# Patient Record
Sex: FEMALE | Race: WHITE | NOT HISPANIC OR LATINO | Employment: OTHER | URBAN - METROPOLITAN AREA
[De-identification: names, ages, dates, MRNs, and addresses within clinical notes are randomized per-mention and may not be internally consistent; named-entity substitution may affect disease eponyms.]

---

## 2017-01-11 ENCOUNTER — HOSPITAL ENCOUNTER (EMERGENCY)
Facility: OTHER | Age: 82
Discharge: HOME | End: 2017-01-11
Attending: EMERGENCY MEDICINE
Payer: MEDICARE

## 2017-01-11 VITALS
DIASTOLIC BLOOD PRESSURE: 70 MMHG | HEART RATE: 74 BPM | OXYGEN SATURATION: 98 % | TEMPERATURE: 98 F | RESPIRATION RATE: 18 BRPM | SYSTOLIC BLOOD PRESSURE: 154 MMHG

## 2017-01-11 DIAGNOSIS — S02.2XXA CLOSED FRACTURE OF NASAL BONE, INITIAL ENCOUNTER: ICD-10-CM

## 2017-01-11 DIAGNOSIS — S02.401A CLOSED FRACTURE OF MAXILLA, UNSPECIFIED LATERALITY, INITIAL ENCOUNTER: Primary | ICD-10-CM

## 2017-01-11 PROCEDURE — 12051 INTMD RPR FACE/MM 2.5 CM/<: CPT

## 2017-01-11 PROCEDURE — 25000003 PHARM REV CODE 250: Performed by: EMERGENCY MEDICINE

## 2017-01-11 PROCEDURE — 99285 EMERGENCY DEPT VISIT HI MDM: CPT | Mod: 25

## 2017-01-11 RX ORDER — AMOXICILLIN AND CLAVULANATE POTASSIUM 875; 125 MG/1; MG/1
1 TABLET, FILM COATED ORAL
Status: COMPLETED | OUTPATIENT
Start: 2017-01-11 | End: 2017-01-11

## 2017-01-11 RX ORDER — AMOXICILLIN AND CLAVULANATE POTASSIUM 875; 125 MG/1; MG/1
1 TABLET, FILM COATED ORAL 2 TIMES DAILY
Qty: 20 TABLET | Refills: 0 | Status: SHIPPED | OUTPATIENT
Start: 2017-01-11 | End: 2017-01-21

## 2017-01-11 RX ORDER — LIDOCAINE HYDROCHLORIDE 10 MG/ML
10 INJECTION INFILTRATION; PERINEURAL
Status: COMPLETED | OUTPATIENT
Start: 2017-01-11 | End: 2017-01-11

## 2017-01-11 RX ORDER — ACETAMINOPHEN 500 MG
1000 TABLET ORAL
Status: COMPLETED | OUTPATIENT
Start: 2017-01-11 | End: 2017-01-11

## 2017-01-11 RX ADMIN — AMOXICILLIN AND CLAVULANATE POTASSIUM 1 TABLET: 875; 125 TABLET, FILM COATED ORAL at 05:01

## 2017-01-11 RX ADMIN — LIDOCAINE HYDROCHLORIDE 10 ML: 10 INJECTION, SOLUTION INFILTRATION; PERINEURAL at 02:01

## 2017-01-11 RX ADMIN — ACETAMINOPHEN 1000 MG: 500 TABLET ORAL at 03:01

## 2017-01-11 NOTE — ED PROVIDER NOTES
Encounter Date: 1/11/2017    SCRIBE #1 NOTE: I, Vicente Block, am scribing for, and in the presence of,  Dr. Cohen I have scribed the entire note.       History     Chief Complaint   Patient presents with    Fall     Pt reipped over someone's foot and hit face on ground. PT has epistaxis with deviated septum per EMS. Denies LOC or blood thinners. Pt has c-collar in place per EMS.      Review of patient's allergies indicates:  No Known Allergies  HPI Comments: Time seen by provider: 2:06 PM    This is a 89 y.o. female who presents via EMS with complaint of wounds to her lip and nose bleeds s/p mechanical trip and fall that occurred approximately 1 hour ago. She fell forward and landing on her face. She denies LOC, neck pain, numbness, weakness, nausea, vomiting, dizziness, light headedness and vision changes.  She denies any feeling of malocclusion.  She is not on any blood thinners. Her tetanus immunization is UTD.     The history is provided by the patient.     Past Medical History   Diagnosis Date    Cancer      No past medical history pertinent negatives.  Past Surgical History   Procedure Laterality Date    Breast surgery       History reviewed. No pertinent family history.  Social History   Substance Use Topics    Smoking status: Never Smoker    Smokeless tobacco: None    Alcohol use No     Review of Systems   Constitutional: Negative for chills, diaphoresis and fever.   HENT: Positive for nosebleeds. Negative for congestion and sore throat.    Eyes: Negative for pain and visual disturbance.   Respiratory: Negative for cough and shortness of breath.    Cardiovascular: Negative for chest pain.   Gastrointestinal: Negative for abdominal pain, diarrhea, nausea and vomiting.   Genitourinary: Negative for difficulty urinating and dysuria.   Musculoskeletal: Negative for back pain and neck pain.   Skin: Positive for color change and wound (upper lip).   Neurological: Negative for dizziness, syncope, weakness,  light-headedness, numbness and headaches.   Psychiatric/Behavioral: Negative for behavioral problems and confusion.       Physical Exam   Initial Vitals   BP Pulse Resp Temp SpO2   01/11/17 1349 01/11/17 1349 01/11/17 1349 01/11/17 1349 01/11/17 1349   194/89 85 17 97.7 °F (36.5 °C) 100 %     Physical Exam    Nursing note and vitals reviewed.  Constitutional: She appears well-developed and well-nourished. She is not diaphoretic. No distress. Cervical collar in place.   HENT:   Head: Normocephalic.   Mouth/Throat: Oropharynx is clear and moist.   Ecchymosis and swelling above the right eye and to the nasal bridge. 1.5cm horizontal laceration just above the upper lip that penetrates to the buccal mucosa. No septal hematoma. Crepitus noted to the cheeks bilaterally. Upper dentures in place.  Lower teeth with no avulsion, no fracture.     Eyes: Conjunctivae are normal. Pupils are equal, round, and reactive to light. Right eye exhibits no discharge. Left eye exhibits no discharge.   Neck: Normal range of motion. Neck supple.   Cardiovascular: Normal rate, regular rhythm and normal heart sounds.   Pulmonary/Chest: Breath sounds normal. No respiratory distress. She has no wheezes. She has no rhonchi. She has no rales.   Abdominal: Soft. Bowel sounds are normal. She exhibits no distension. There is no tenderness. There is no rebound and no guarding.   Musculoskeletal: Normal range of motion. She exhibits no edema or tenderness.   No C/T/L midline tenderness.    Neurological: She is alert and oriented to person, place, and time. She has normal strength. No sensory deficit.   Skin: Skin is warm and dry. No rash and no abscess noted. No erythema. No pallor.   Psychiatric: She has a normal mood and affect. Her behavior is normal. Judgment and thought content normal.         ED Course   Lac Repair  Date/Time: 1/11/2017 5:01 PM  Performed by: INNA DIXON.  Authorized by: INNA DIXON.   Consent Done: Not Needed  Body area:  mouth  Laceration length: 1.5 cm  Foreign bodies: no foreign bodies  Tendon involvement: none  Nerve involvement: none  Vascular damage: no  Anesthesia: local infiltration    Anesthesia:  Anesthesia: local infiltration  Local Anesthetic: lidocaine 1% without epinephrine   Anesthetic total: 2 mL  Patient sedated: no  Preparation: Patient was prepped and draped in the usual sterile fashion.  Irrigation solution: saline  Irrigation method: jet lavage  Amount of cleaning: extensive  Debridement: none  Degree of undermining: none  Skin closure: 6-0 Prolene (4 sutures)  Mucous membrane closure: 4-0 Chromic gut (5 sutures)  Number of sutures: 9  Technique: simple  Approximation: close  Approximation difficulty: complex  Patient tolerance: Patient tolerated the procedure well with no immediate complications        Labs Reviewed - No data to display           Imaging Results         CT Cervical Spine Without Contrast (Final result) Result time:  01/11/17 15:12:24    Final result by Dayanna Hopper MD (01/11/17 15:12:24)    Impression:      1. There are extensive bilateral mid face fractures. There are fractures of the anterior, lateral and medial walls of both maxillary sinuses with blood products seen within both  maxillary sinuses, bilateral nasal bone fractures, fractures of bilateral medial and lateral pterygoid plates and fractures of the anterior posterior wall of left sphenoid sinus.  2. There is no evidence acute cervical spine fracture. There are degenerative changes of the cervical spine.  3. There is no evidence acute intracranial process.        Electronically signed by: DAYANNA HOPPER MD  Date:     01/11/17  Time:    15:12     Narrative:    CT head, face and cervical spine    Cervical spine: There are degenerative changes throughout the cervical spine. There is evidence fracture. There is no prevertebral soft tissue swelling. There is scarring of bilateral pulmonary apices.    CT face: The there are bilateral  anterior, lateral and medial maxillary wall fractures with air-fluid levels and dense fluid seen within both maxillary sinuses.    Emphysema seen within the soft tissues of the left cheek and surrounding the left muscles of mastication.    There are bilateral nasal bone fractures.    There are fractures of bilateral medial and lateral pterygoid plates. There is are fractures of the anterior and posterior walls of the left sphenoid sinus with an air-fluid level seen within the sphenoid sinus.    The patient has had bilateral cataract repair.    CT head: there is no evidence of extra-axial fluid collection, midline shift, mass or mass effect. There is no evidence acute intracranial hemorrhage. The mastoid air cells are clear.            CT Maxillofacial Without Contrast (Final result) Result time:  01/11/17 15:12:24    Final result by Dayanna Hopper MD (01/11/17 15:12:24)    Impression:      1. There are extensive bilateral mid face fractures. There are fractures of the anterior, lateral and medial walls of both maxillary sinuses with blood products seen within both  maxillary sinuses, bilateral nasal bone fractures, fractures of bilateral medial and lateral pterygoid plates and fractures of the anterior posterior wall of left sphenoid sinus.  2. There is no evidence acute cervical spine fracture. There are degenerative changes of the cervical spine.  3. There is no evidence acute intracranial process.        Electronically signed by: DAYANNA HOPPER MD  Date:     01/11/17  Time:    15:12     Narrative:    CT head, face and cervical spine    Cervical spine: There are degenerative changes throughout the cervical spine. There is evidence fracture. There is no prevertebral soft tissue swelling. There is scarring of bilateral pulmonary apices.    CT face: The there are bilateral anterior, lateral and medial maxillary wall fractures with air-fluid levels and dense fluid seen within both maxillary sinuses.    Emphysema  seen within the soft tissues of the left cheek and surrounding the left muscles of mastication.    There are bilateral nasal bone fractures.    There are fractures of bilateral medial and lateral pterygoid plates. There is are fractures of the anterior and posterior walls of the left sphenoid sinus with an air-fluid level seen within the sphenoid sinus.    The patient has had bilateral cataract repair.    CT head: there is no evidence of extra-axial fluid collection, midline shift, mass or mass effect. There is no evidence acute intracranial hemorrhage. The mastoid air cells are clear.            CT Head Without Contrast (Final result) Result time:  01/11/17 15:12:23    Final result by Dayanna Hopper MD (01/11/17 15:12:23)    Impression:      1. There are extensive bilateral mid face fractures. There are fractures of the anterior, lateral and medial walls of both maxillary sinuses with blood products seen within both  maxillary sinuses, bilateral nasal bone fractures, fractures of bilateral medial and lateral pterygoid plates and fractures of the anterior posterior wall of left sphenoid sinus.  2. There is no evidence acute cervical spine fracture. There are degenerative changes of the cervical spine.  3. There is no evidence acute intracranial process.        Electronically signed by: DAYANNA HOPPER MD  Date:     01/11/17  Time:    15:12     Narrative:    CT head, face and cervical spine    Cervical spine: There are degenerative changes throughout the cervical spine. There is evidence fracture. There is no prevertebral soft tissue swelling. There is scarring of bilateral pulmonary apices.    CT face: The there are bilateral anterior, lateral and medial maxillary wall fractures with air-fluid levels and dense fluid seen within both maxillary sinuses.    Emphysema seen within the soft tissues of the left cheek and surrounding the left muscles of mastication.    There are bilateral nasal bone fractures.    There  are fractures of bilateral medial and lateral pterygoid plates. There is are fractures of the anterior and posterior walls of the left sphenoid sinus with an air-fluid level seen within the sphenoid sinus.    The patient has had bilateral cataract repair.    CT head: there is no evidence of extra-axial fluid collection, midline shift, mass or mass effect. There is no evidence acute intracranial hemorrhage. The mastoid air cells are clear.             Medical Decision Making:   History:   Old Medical Records: I decided to obtain old medical records.  Old Records Summarized: other records and records from previous admission(s).  Initial Assessment:   2:06PM:  Pt is a 89 y/o F who presents to ED s/p mechanical fall. Pt appears well, nontoxic.  She does have facial ecchymosis and swelling.  Will plan for irrigation, CT imaging, will continue to follow and reassess.  Clinical Tests:   Radiological Study: Ordered and Reviewed  Other:   I have discussed this case with another health care provider.    3:25PM:  Pt has extensive facial fractures on her CT.  Will plan to consult plastics. I updated pt and family regarding CT results and plan for plastic surgery, will continue to follow.      3:32PM:  I spoke with Dr. Gonzalez.  He will review the images and will call back.      4:06 PM - I spoke with Dr. Gonzalez again.  Pt can be seen tomorrow for follow up at 830AM.  Pt should be placed on Augmentin BID x 10 days along with nose injury precautions, and soft mechanical diet.  I updated pt and family regarding my conversation with Dr. Gonzalez. Will plan for laceration repair, will continue to follow.    5:31PM:  Pt doing well, she tolerated suture repair with no issues.  She was able to ambulate afterwards to the bathroom with steady gait and no issues.  Will plan to give her first dose of augmentin here.  I had a long discussion with the pt and son at bedside regarding plan of care.  While I do not think she requires  admission at this time, I did offer admission for further management if the pt and/or family felt uncomfortable with her going home given that is elderly and lives alone.  However pt does feel that she is fine to go home and feels comfortably going home and getting rest and will f/u tomorrow at her appt.  I counseled the pt regarding wound care precautions, nasal injury precautions and ED return warnings.  Pt agreeable to plan and all questions answered.  I feel that pt is stable for discharge and management as an outpatient and no further intervention is needed at this time.  Pt is comfortable returning to the ED if needed.  Will DC home in stable condition.                Scribe Attestation:   Scribe #1: I performed the above scribed service and the documentation accurately describes the services I performed. I attest to the accuracy of the note.    Attending Attestation:           Physician Attestation for Scribe:  Physician Attestation Statement for Scribe #1: I, Dr. Hurd, reviewed documentation, as scribed by Vicente Block in my presence, and it is both accurate and complete.                 ED Course     Clinical Impression:     1. Closed fracture of maxilla, unspecified laterality, initial encounter    2. Closed fracture of nasal bone, initial encounter             Monalisa Hurd MD  01/11/17 2001

## 2017-01-11 NOTE — ED NOTES
Ambulated with pt in hallway; Dr Hurd watched pt ambulating; pt ambulated with steady gait; denies any dizziness; pt started having epistasis from unknown source; Dr Hurd notified; pt given gauze and pressure held to bridge of nose.

## 2017-01-11 NOTE — DISCHARGE INSTRUCTIONS
We have prescribed you antibiotics. Please fill and take as directed. It is important that you completely finish the antibiotics.      Restrict your diet to liquids and pureed and soft foods.  Do not eat any foods that require chewing.    Do not use your dentures at this time.      Keep your head of the bed elevated at night and do not blow your nose.      Please return to the ER if you have chest pain, difficulty breathing, fevers, altered mental status, dizziness, weakness, or any other concerns.      Follow up with Dr. Gonzalez at 8:30AM tomorrow.      Have the sutures removed in approximately 1 week.

## 2017-01-11 NOTE — ED TRIAGE NOTES
Pt tripped over someone's foot and hit face on ground. PT has epistaxis with deviated septum per EMS. Denies LOC or blood thinners. Pt has c-collar in place per EMS.

## 2017-01-11 NOTE — ED NOTES
Swelling now noted to left cheek area with different stages of bruising to different areas of face

## 2017-01-11 NOTE — ED AVS SNAPSHOT
[facility]  [address]  [facility] [address]               Jayne Aponte   2017  1:44 PM   ED    Description:  Female : 1921   Department:  [facility]           Your Care was Coordinated By:     Provider Role From To    Monalisa Hurd MD Attending Provider 17 1346 --      Reason for Visit     Fall           Diagnoses this Visit        Comments    Closed fracture of maxilla, unspecified laterality, initial encounter    -  Primary     Closed fracture of nasal bone, initial encounter           ED Disposition     ED Disposition Condition Comment    Discharge             To Do List           Follow-up Information     Follow up with Ankit Vazquez Jr, MD In 1 day.    Specialties:  Plastic Surgery, Hand Surgery    Why:  at 830AM    Contact information:    [address]  [address]  [facility]  [facility] [address]  [phone]         These Medications        Disp Refills Start End    amoxicillin-clavulanate 875-125mg (AUGMENTIN) 875-125 mg per tablet 20 tablet 0 2017    Take 1 tablet by mouth 2 (two) times daily. - Oral    Pharmacy: [facility]/pharmacy #[id number] - [place], [place] - [address] Ph #: [phone]         [facility]     [facility] -  Assistance  Registered nurses in the [facility] provide clinical advisement, health education, appointment booking, and other advisory services.  Call for this free service at [phone].             Medications           START taking these NEW medications        Refills    amoxicillin-clavulanate 875-125mg (AUGMENTIN) 875-125 mg per tablet 0    Sig: Take 1 tablet by mouth 2 (two) times daily.    Class: Normal    Route: Oral      These medications were administered today        Dose Freq    lidocaine HCL 10 mg/ml (1%) injection 10 mL 10 mL ED 1 Time    Sig: 10 mLs by Infiltration route ED 1 Time.    Class: Normal     Route: Infiltration    acetaminophen tablet 1,000 mg 1,000 mg ED 1 Time    Sig: Take 2 tablets (1,000 mg total) by mouth ED 1 Time.    Class: Normal    Route: Oral    amoxicillin-clavulanate 875-125mg per tablet 1 tablet 1 tablet ED 1 Time    Sig: Take 1 tablet by mouth ED 1 Time.    Class: Normal    Route: Oral           Verify that the below list of medications is an accurate representation of the medications you are currently taking.  If none reported, the list may be blank. If incorrect, please contact your healthcare provider. Carry this list with you in case of emergency.           Current Medications     metoprolol succinate (TOPROL-XL) 25 MG 24 hr tablet Take 25 mg by mouth once daily.    amoxicillin-clavulanate 875-125mg (AUGMENTIN) 875-125 mg per tablet Take 1 tablet by mouth 2 (two) times daily.           Clinical Reference Information           Your Vitals Were     BP Pulse Temp Resp SpO2       154/70 74 97.7 °F (36.5 °C) (Oral) 18 98%       Allergies as of 1/11/2017     No Known Allergies      Immunizations Administered on Date of Encounter - 1/11/2017     None      ED Micro, Lab, POCT     None      ED Imaging Orders     Start Ordered       Status Ordering Provider    01/11/17 1417 01/11/17 1417  CT Cervical Spine Without Contrast  1 time imaging      Final result     01/11/17 1416 01/11/17 1417  CT Head Without Contrast  1 time imaging      Final result     01/11/17 1416 01/11/17 1417  CT Maxillofacial Without Contrast  1 time imaging      Final result         Discharge Instructions       We have prescribed you antibiotics. Please fill and take as directed. It is important that you completely finish the antibiotics.      Restrict your diet to liquids and pureed and soft foods.  Do not eat any foods that require chewing.    Do not use your dentures at this time.      Keep your head of the bed elevated at night and do not blow your nose.      Please return to the ER if you have chest pain, difficulty  breathing, fevers, altered mental status, dizziness, weakness, or any other concerns.      Follow up with Dr. Gonzalez at 8:30AM tomorrow.      Have the sutures removed in approximately 1 week.        Discharge References/Attachments     FRACTURE, FACIAL (ENGLISH)    LACERATION, FACE: SUTURE OR TAPE (ENGLISH)      [facility] Sign-Up     Activating your [facility] account is as easy as 1-2-3!     1) Visit [email or web address], select Sign Up Now, enter this activation code and your date of birth, then select Next.  [email or web address]  Expires: 2/25/2017  5:37 PM      2) Create a username and password to use when you visit [facility] in the future and select a security question in case you lose your password and select Next.    3) Enter your e-mail address and click Sign Up!    Additional Information  If you have questions, please e-mail [email or web address] or call [phone] to talk to our [facility] staff. Remember, [facility] is NOT to be used for urgent needs. For medical emergencies, dial 911.          [facility] complies with applicable Federal civil rights laws and does not discriminate on the basis of race, color, national origin, age, disability, or sex.        Language Assistance Services     ATTENTION: Language assistance services are available, free of charge. Please call [phone].      ATENCIÓN: Si habla español, tiene a villalobos disposición servicios gratuitos de asistencia lingüística. Llame al [phone].     CHÚ Ý: N?u b?n nói Ti?ng Vi?t, có các d?ch v? h? tr? ngôn ng? mi?n phí dành cho b?n. G?i s? [phone].

## 2017-06-13 ENCOUNTER — TELEPHONE (OUTPATIENT)
Dept: SPINE | Facility: CLINIC | Age: 82
End: 2017-06-13

## 2017-06-13 ENCOUNTER — HOSPITAL ENCOUNTER (OUTPATIENT)
Dept: RADIOLOGY | Facility: OTHER | Age: 82
Discharge: HOME | End: 2017-06-13
Attending: PHYSICAL MEDICINE & REHABILITATION
Payer: MEDICARE

## 2017-06-13 ENCOUNTER — OFFICE VISIT (OUTPATIENT)
Dept: SPINE | Facility: CLINIC | Age: 82
End: 2017-06-13
Attending: PHYSICAL MEDICINE & REHABILITATION
Payer: MEDICARE

## 2017-06-13 VITALS
HEART RATE: 74 BPM | BODY MASS INDEX: 19.49 KG/M2 | HEIGHT: 65 IN | SYSTOLIC BLOOD PRESSURE: 180 MMHG | DIASTOLIC BLOOD PRESSURE: 75 MMHG | WEIGHT: 117 LBS

## 2017-06-13 DIAGNOSIS — M54.2 NECK PAIN: ICD-10-CM

## 2017-06-13 DIAGNOSIS — M51.36 DDD (DEGENERATIVE DISC DISEASE), LUMBAR: ICD-10-CM

## 2017-06-13 DIAGNOSIS — R29.898 HAND WEAKNESS: ICD-10-CM

## 2017-06-13 DIAGNOSIS — M62.838 MUSCLE SPASM: Primary | ICD-10-CM

## 2017-06-13 DIAGNOSIS — M47.816 SPONDYLOSIS OF LUMBAR REGION WITHOUT MYELOPATHY OR RADICULOPATHY: ICD-10-CM

## 2017-06-13 PROCEDURE — 99204 OFFICE O/P NEW MOD 45 MIN: CPT | Mod: S$GLB,,, | Performed by: PHYSICAL MEDICINE & REHABILITATION

## 2017-06-13 PROCEDURE — 99999 PR PBB SHADOW E&M-EST. PATIENT-LVL IV: CPT | Mod: PBBFAC,,, | Performed by: PHYSICAL MEDICINE & REHABILITATION

## 2017-06-13 PROCEDURE — 1125F AMNT PAIN NOTED PAIN PRSNT: CPT | Mod: S$GLB,,, | Performed by: PHYSICAL MEDICINE & REHABILITATION

## 2017-06-13 PROCEDURE — 1159F MED LIST DOCD IN RCRD: CPT | Mod: S$GLB,,, | Performed by: PHYSICAL MEDICINE & REHABILITATION

## 2017-06-13 PROCEDURE — 72114 X-RAY EXAM L-S SPINE BENDING: CPT | Mod: 26,,, | Performed by: RADIOLOGY

## 2017-06-13 PROCEDURE — 72114 X-RAY EXAM L-S SPINE BENDING: CPT | Mod: TC

## 2017-06-13 PROCEDURE — 72050 X-RAY EXAM NECK SPINE 4/5VWS: CPT | Mod: TC

## 2017-06-13 PROCEDURE — 72050 X-RAY EXAM NECK SPINE 4/5VWS: CPT | Mod: 26,,, | Performed by: RADIOLOGY

## 2017-06-13 RX ORDER — TIZANIDINE 2 MG/1
2 TABLET ORAL EVERY 8 HOURS PRN
Qty: 90 TABLET | Refills: 2 | Status: SHIPPED | OUTPATIENT
Start: 2017-06-13 | End: 2017-07-27

## 2017-06-13 RX ORDER — MELOXICAM 15 MG/1
15 TABLET ORAL DAILY
Qty: 30 TABLET | Refills: 1 | Status: SHIPPED | OUTPATIENT
Start: 2017-06-13 | End: 2017-07-27

## 2017-06-13 NOTE — PROGRESS NOTES
Subjective:      Patient ID: Jayne Aponte is a 89 y.o. female.    Chief Complaint: Back Pain    Ms Aponte is a 89 yo female here for evaluation of low back pain.  She has had low back pain for the past 20 years.  She feels like the back pain has gotten worse the past week.  She feels like she also has a weakness in her arms and an unsteady gait.  The pain is constant is the lower back.  The pain is worst with walking.  She feels like she is insecure in walking, she feels like her back is hurting and so bending forward more and less able to walk. She has had injections in her back with no relief.  She did have a fall in January but she does not feel like that had anything to do with the pain.  The pain is a steady achy pain.  The pain is 5/10 now, worst 10/10 with walking, best 5/10 sitting and lying down.  She does have macular degeneration.  There is no leg pain.  She has a history of breast and colon cancer in the past.     She has pain in both arms and a dull feeling.  There is no numbness and no tingling.  The arm pain is all the time.  The pain in the arm is new the hand pain is old.  She has gone to orthopaedics in pain.  It is an odd feeling all the time.  She feels it all the time.      She will take tylenol for the pain.  She does not take nsaids, because she feels like it effected her vision in the past.      MRI lumbar 2016 report  Multilevel DDD and spondylosis [id number] without spinal stenosis or NF narrowing    Past Medical History:  No date: Cancer    Past Surgical History:  No date: BREAST SURGERY    No family history on file.      Social History    Marital status:             Spouse name:                       Years of education:                 Number of children:               Social History Main Topics    Smoking status: Never Smoker                                                                Alcohol use: No                Current Outpatient Prescriptions:  metoprolol succinate  (TOPROL-XL) 25 MG 24 hr tablet, Take 25 mg by mouth once daily., Disp: , Rfl:     No current facility-administered medications for this visit.       Review of patient's allergies indicates:  No Known Allergies          Review of Systems   Constitution: Negative for weight gain and weight loss.   Cardiovascular: Negative for chest pain.   Respiratory: Negative for shortness of breath.    Musculoskeletal: Positive for back pain. Negative for joint pain and joint swelling.   Gastrointestinal: Negative for abdominal pain and bowel incontinence.   Genitourinary: Negative for bladder incontinence.   Neurological: Positive for paresthesias (weird feeling in arms). Negative for numbness.         Objective:        General: Jayne is well-developed, well-nourished, appears stated age, in no acute distress, alert and oriented to time, place and person.     General    Vitals reviewed.  Constitutional: She is oriented to person, place, and time. She appears well-developed and well-nourished.   HENT:   Head: Normocephalic and atraumatic.   Pulmonary/Chest: Effort normal.   Neurological: She is alert and oriented to person, place, and time.   Psychiatric: She has a normal mood and affect. Her behavior is normal. Judgment and thought content normal.     General Musculoskeletal Exam   Gait: normal     Right Ankle/Foot Exam     Tests   Heel Walk: able to perform  Tiptoe Walk: able to perform    Left Ankle/Foot Exam     Tests   Heel Walk: able to perform  Tiptoe Walk: able to perform  Back (L-Spine & T-Spine) / Neck (C-Spine) Exam     Tenderness Right paramedian tenderness of the Sacrum and Upper C-Spine. Left paramedian tenderness of the Lower L-Spine and Upper C-Spine.     Back (L-Spine & T-Spine) Range of Motion   Extension: 10   Flexion: 60   Lateral Bend Right: 10   Lateral Bend Left: 10   Rotation Right: 30   Rotation Left: 30     Neck (C-Spine) Range of Motion   Flexion:     40  Extension: 40    Spinal Sensation   Right Side  Sensation  C-Spine Level: normal   L-Spine Level: normal  S-Spine Level: normal  Left Side Sensation  C-Spine Level: normal  L-Spine Level: normal  S-Spine Level: normal    Back (L-Spine & T-Spine) Tests   Right Side Tests  Straight leg raise:      Sitting SLR: > 70 degrees      Left Side Tests  Straight leg raise:     Sitting SLR: > 70 degrees          Other She has scoliosis .  Spinal Kyphosis:  Absent      Right Hand/Wrist Exam     Tests     Atrophy   Thenar:  positive  Intrinsic:  positive  1st Dorsal Interosseous: positive      Left Hand/Wrist Exam     Tests     Atrophy  Thenar:  positive  Intrinsic: positive  1st Dorsal Interosseous:  positive          Muscle Strength   Right Upper Extremity   Biceps: 5/5/5   Deltoid:  5/5  Triceps:  5/5  Wrist Extension: 5/5/5   Finger Flexors:  5/5  Left Upper Extremity  Biceps: 5/5/5   Deltoid:  5/5  Triceps:  5/5  Wrist Extension: 5/5/5   Finger Flexors:  5/5  Right Lower Extremity   Hip Flexion: 5/5   Quadriceps:  5/5   Anterior tibial:  5/5/5  EHL:  5/5  Left Lower Extremity   Hip Flexion: 5/5   Quadriceps:  5/5   Anterior tibial:  5/5/5   EHL:  5/5    Reflexes     Left Side  Biceps:  2+  Triceps:  2+  Brachioradialis:  2+  Quadriceps:  2+  Achilles:  2+  Left Farooq's Sign:  Absent  Babinski Sign:  absent    Right Side   Biceps:  2+  Triceps:  2+  Brachioradialis:  2+  Quadriceps:  2+  Achilles:  2+  Right Farooq's Sign:  absent  Babinski Sign:  absent    Vascular Exam     Right Pulses        Carotid:                  2+    Left Pulses        Carotid:                  2+              Assessment:       1. Muscle spasm    2. DDD (degenerative disc disease), lumbar    3. Spondylosis of lumbar region without myelopathy or radiculopathy    4. Hand weakness    5. Neck pain           Plan:       Orders Placed This Encounter    X-Ray Cervical Spine AP Lat with Flexion  Extension    X-Ray Lumbar Complete With Flex And Ext    MRI Cervical Spine Without Contrast     Ambulatory Referral to Physical/Occupational Therapy    meloxicam (MOBIC) 15 MG tablet    tizanidine (ZANAFLEX) 2 MG tablet     More than 50% of the total time of 45 minutes was spent in counseling on diagnosis and treatment options. We discussed back pain and the nature of back pain.  We discussed that it is not one thing that causes the pain but an accumulation of multiple things that we do.  We discussed posture sitting and the importance of trying to sit better.  We discussed the benefits of therapy and exercise and continuing to move.  She has done therapy before but says it was mainly a heating pad.  She feels unsteady because she is not able to be as active, she cannot see and now it hurts when she walks.  She does have some scoliosis and she has some muscle spasms.  She could benefit from left quadratus lumborum and right iliolumbar trigger point injections.  She also does not take any meds.  She would like to try meds first.  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  X-ray cervical and lumbar  2.  MRI cervical spine  3.  PT for low back and progressive resistance exercise pattern 1  4.  mobic 15mg po Qday  5.  Tizanidine 2mg po TID as needed  6.  rtc 6 weeks      Follow-up: Return in about 6 weeks (around 7/25/2017). If there are any questions prior to this, the patient was instructed to contact the office.

## 2017-06-30 ENCOUNTER — DOCUMENTATION ONLY (OUTPATIENT)
Dept: REHABILITATION | Facility: OTHER | Age: 82
End: 2017-06-30
Attending: PHYSICAL MEDICINE & REHABILITATION
Payer: MEDICARE

## 2017-06-30 ENCOUNTER — HOSPITAL ENCOUNTER (OUTPATIENT)
Dept: RADIOLOGY | Facility: OTHER | Age: 82
Discharge: HOME | End: 2017-06-30
Attending: PHYSICAL MEDICINE & REHABILITATION
Payer: MEDICARE

## 2017-06-30 ENCOUNTER — TELEPHONE (OUTPATIENT)
Dept: SPINE | Facility: CLINIC | Age: 82
End: 2017-06-30

## 2017-06-30 DIAGNOSIS — R29.898 HAND WEAKNESS: ICD-10-CM

## 2017-06-30 DIAGNOSIS — M54.2 NECK PAIN: ICD-10-CM

## 2017-06-30 DIAGNOSIS — M51.36 DDD (DEGENERATIVE DISC DISEASE), LUMBAR: Primary | ICD-10-CM

## 2017-06-30 PROCEDURE — 97161 PT EVAL LOW COMPLEX 20 MIN: CPT

## 2017-06-30 PROCEDURE — 72141 MRI NECK SPINE W/O DYE: CPT | Mod: 26,,, | Performed by: RADIOLOGY

## 2017-06-30 PROCEDURE — G8979 MOBILITY GOAL STATUS: HCPCS | Mod: CJ

## 2017-06-30 PROCEDURE — 97110 THERAPEUTIC EXERCISES: CPT

## 2017-06-30 PROCEDURE — G8978 MOBILITY CURRENT STATUS: HCPCS | Mod: CK

## 2017-06-30 PROCEDURE — 72141 MRI NECK SPINE W/O DYE: CPT | Mod: TC

## 2017-06-30 NOTE — TELEPHONE ENCOUNTER
Reviewed her MRI results with her before her therapy appointment.  She does have some degenerative changes in her neck.  Might explain some of arm tiredness. No damage to spinal cord.  She does not complain of significant neck pain.  She is starting therapy for her low back pain

## 2017-06-30 NOTE — PROGRESS NOTES
[facility] - PHYSICAL THERAPY EVALUATION     Name: Jayne Aponte  Clinic Number: [id number]    Jayne is a 89 y.o. female evaluated on 06/30/2017.   Time In: 10:30  Time out: 12:00    Diagnosis:   Encounter Diagnosis   Name Primary?    DDD (degenerative disc disease), lumbar Yes     Physician: Emeli Lopez, *  Treatment Orders: PT Eval and Treat    Past Medical History:   Diagnosis Date    Cancer      Current Outpatient Prescriptions   Medication Sig    meloxicam (MOBIC) 15 MG tablet Take 1 tablet (15 mg total) by mouth once daily.    metoprolol succinate (TOPROL-XL) 25 MG 24 hr tablet Take 25 mg by mouth once daily.    tizanidine (ZANAFLEX) 2 MG tablet Take 1 tablet (2 mg total) by mouth every 8 (eight) hours as needed.     No current facility-administered medications for this visit.      Review of patient's allergies indicates:  No Known Allergies  Precautions: Hx of cancer     Visit # authorized: 12  Authorization period: 8/30/2017  Plan of care Expiration: 9/28/2017      HISTORY     History of Present Illness: Ms Aponte is a 89 yo female here for evaluation of low back pain.  She has had low back pain for the past 20 years.  She feels like the back pain has gotten worse over time The pain is constant is the lower back.  The pain is worst with walking and in the morning.  She feels like she is insecure in walking, she feels like her back is hurting and so bending forward more and less able to walk. She has had injections in her back with no relief.  She did have a fall in January and she now feels unsteady on her feet.  The pain is a steady achy pain.  The pain is 4/10 now, worst 10/10 with walking, best 4/10 sitting and lying down.  She does have macular degeneration.  There is no leg pain.  She has a history of breast and colon cancer in the past.         She will take tylenol for the pain.  She does not take nsaids, because she feels like it effected her vision in the past.        Diagnostic Tests: From [facility] MRI  MRI lumbar 2016 report  Multilevel DDD and spondylosis [id number] without spinal stenosis or NF narrowing    Pain Scale: Jayne rates pain on a scale of 0-10 to be 10 at worst; 4 currently; 4 at best using VAS.   Pain location: low back and B hips    Aggravating factors: lifting, walking, morning, lying on stomach  Easing Factors: Sitting, leg exercises (stretches), Tylenol.   Disturbed Sleep: no    Patient reports that she is not taking Meloxicam nor Zanaflex.   Pattern of pain questions:  1.  Where is your pain the worst? Low back   2.  Is your pain constant or intermittent? Constant   3.  Does bending forward make your typical pain worse? Yes   4.  Since the start of your back pain, has there been a change in your bowel or bladder?  No  5.  What can't you do now that you use to be able to do? Work at [employer].     Prior Treatment: Physical Therapy for back, no real help.   Prior functional status: Independent  DME owned/used: none  Occupation:  Worked at [employer]   Leisure: watch TV                     Pts goals:  Decrease back pain and improve function.     Red Flag Screening:   Cough  Sneeze  Strain: No  Bladder/ bowel: No  Falls: Yes 1 in past 6 months  General health: Good  Night pain: No   Unexplained weight loss: No     OBJECTIVE     POSTURE  Posture Alignment :slouched posture  Postural examination/scapula alignment: Rounded shoulder, Head forward and Affected scapula elevated  Joint integrity: Lumbar spine hypomobile as seen through spring testing  Skin integrity: WNL   Edema: Not significant   Sitting: Poor  Standing: Poor  Correction of posture: Added slimline roll, Improved posture in sitting.     MOVEMENT LOSS    ROM Loss   Flexion major loss   Extension major loss   Side bending Right moderate loss   Side bending Left moderate loss   Rotation Right moderate loss   Rotation Left moderate loss     Lower Extremity Strength  Right LE  Left LE    Hip flexion: 5/5 Hip  flexion: 5/5   Hip extension:  5/5 Hip extension: 5/5   Hip abduction: 5/ Hip abduction:    Hip adduction:   Hip adduction:     Hip Internal rotation    Hip Internal rotation 5   Knee Flexion 5/ Knee Flexion 5/   Knee Extension 5/ Knee Extension    Ankle dorsiflexion:  Ankle dorsiflexion:    Ankle plantarflexion:  Ankle plantarflexion:        GAIT:  Assistive Device used: None  Level of Assistance: Independent  Patient displays the following gait deviations: Decreased gait speed, decreased stride length, unsteadiness     Special Tests:   Test Name  Test Result   Prone Instability Test NT   SI Joint Provocation Test (--)   Straight Leg Raise (+)   Neural Tension Test (--)   Crossed Straight Leg Raise (--)   Walking on toes (--)   Walking on heels  (--)       NEUROLOGICAL SCREENING     Sensory deficit: Tactile WNL    Reflexes:    Left Right   Patella Tendon 2+ 2+   Achilles Tendon 2+ 2+   Babinski NT NT   Clonus - -     REPEATED TEST MOVEMENTS:  Repeated Flexion in Standing end range pain   Repeated Extension in Standing end range pain   Repeated Flexion in lying worse independently, better with ball for support   Repeated Extension in lying  NT       STATIC TESTS   Sitting slouched  no effect   Sitting erect worse   Standing slouched no effect   Standing erect  no effect   Lying prone in extension  NT   Long sitting   NT     CHRISTIE - positive B  Hip scour - negative B  Leg length - equal B    Baseline Isometric Testing on Med X equipment: Testing administered by PT    Baseline IM Testing Results:   Date of testin2017  ROM 0-36 deg   Max Peak Torque 45    Min Peak Torque 12    Flex/Ext Ratio 3.75:1   % below normative data 68.25       FOTO: Focus on Therapeutic Outcomes   Category: lumbar   % Impaired: 52  Current Score  = CK = at least 40% but < 60% impaired, limited or restricted  Goal at Discharge Score = CJ = at least 20% but < 40% impaired, limited or restricted    Score  interpretation is as follows:     TEST SCORE  Modifier  Impairment Limitation Restriction    0/50  CH  0 % impaired, limited or restricted   1-9/50  CI  @ least 1% but less than 20% impaired, limited or restricted   10-19/50  CJ  @ least 20%<40% impaired, limited or restricted   20-29/50  CK  @ least 40%<60% impaired, limited or restricted   30-39/50  CL  @ least 60% <80% impaired, limited or restricted   40-49/50  CM  @ least 80%<100% impaired limited or restricted   50/50  CN  100% impaired, limited or restricted       Treatment   Time In: 11:30  Time Out: 12:00    PT Evaluation Completed? Yes  Discussed Plan of Care with patient: Yes      Written Home Exercises Provided:   Handouts were given to the patient. Pt demo good understanding of the education provided. Jayne demonstrated good return demonstration of activities.     - Patient received education regarding proper posture and body mechanics.    - Salma roll tried, recommended, and purchase information was provided.    - Patient received a handout regarding anticipated muscular soreness following the isometric test and strategies for management were reviewed with patient including stretching, using ice and scheduled rest.     HEP as follows    Flexion in lying 10x, 3x/day   PPT 10x, 3x/day   LTR 10x 3x/day  Pt was instructed in and performed the following:   Cardiovascular exercise and therapeutic exercise to improve posture, lumbar/cervical ROM, strength, and muscular endurance as follows:     Jayne received therapeutic exercises to develop/improve posture, lumbar/cervical ROM, strength and muscular endurance for 30 minutes including the following exercises: [employer] lumbar machine testing    [facility] 6/30/2017   Visit Number 1   VAS Pain Rating 4   Flexion in Lying 10   Lumbar Extension Seat Pad 1   Femur Restraint 4   Top Dead Center 24   Counterweight 101   Lumbar Flexion 36   Lumbar Extension 0   Lumbar Peak Torque 45   Min Torque 12    Percent From Norm -68.25   Ice - Z Lie (in min.) 10       Assessment   This is a 89 y.o. female referred to [email or web address] and presents with a medical diagnosis of   Encounter Diagnosis   Name Primary?    DDD (degenerative disc disease), lumbar Yes    and demonstrates limitations as described below in the problem list. Pt rehab potential is Good. Pt presents with lumbar dysfunction, poor posture, weakness.    Pain Pattern: 1 PEN - hinging at L3-4 with flexion, unable to tolerate prone exercises.        Patient received education on the [facility] program, purpose of the isometric test, progression of back strengthening as well as wellness approach and systemic strengthening.  Details of the program were discussed.  Reviewed that patient should feel support/pressure from med ex restraints but no pain or discomfort and patient expressed understanding.    Based on the above history and physical examination an active physical therapy program is recommended.  Pt will continue to benefit from skilled outpatient physical therapy to address the deficits listed below in the chart, provide pt/family education and to maximize pt's level of independence in the home and community environment. .     No environmental, cultural, spiritual, developmental or education needs expressed or noted    Medical necessity is demonstrated by the following problem list.    Pt presents with the following impairments:   History  Co-morbidities and personal factors that may impact the plan of care Examination  Body Structures and Functions, activity limitations and participation restrictions that may impact the plan of care Clinical Presentation   Decision Making/ Complexity Score   Co-morbidities:   advanced age        Personal Factors:   age Body Regions:   back    Body Systems:   gross symmetry  ROM  strength  gait  transfers  motor control    Activity limitations:   Learning and applying knowledge  no deficits    General Tasks and  Commands  no deficits    Communication  no deficits    Mobility  lifting and carrying objects  walking    Self care  no deficits    Domestic Life  shopping  cooking  doing house work (cleaning house, washing dishes, laundry)    Interactions/Relationships  no deficits    Life Areas  employment    Community and Social Life  no deficits    Participation Restrictions:   work     stable and uncomplicated   low         GOALS: Pt is in agreement with the following goals.    Short term goals:  6 weeks or 10 visits   1.  Pt will demonstrate increased lumbar ROM by at least 3 degrees from the initial ROM value with improvements noted in functional ROM and ability to perform ADLs  2.  Pt will demonstrate increased maximum isometric torque value by 5% when compared to the initial value resulting in improved ability to perform bending, lifting, and carrying activities safely, confidently.  3.  Patient report a reduction in worst pain score by 1-2 points for improved tolerance during work and recreational activities  4.  Pt able to perform HEP correctly with minimal cueing or supervision for therapist    Long term goals: 13 weeks or 20 visits   1. Pt will demonstrate increased lumbar ROM by at least 6 degrees from initial ROM value, resulting in improved ability to perform functional fwd bending while standing and sitting.   2. Pt will demonstrate increased maximum isometric torque value by 10% when compared to the initial value resulting in improved ability to perform bending, lifting, and carrying activities safely, confidently.  3. Pt to demonstrate ability to independently control and reduce their pain through posture positioning and mechanical movements throughout a typical day.  4.  Patient will demonstrate improved overall function per FOTO Survey to CJ = at least 20% but < 40% impaired, limited or restricted score or less.      Plan   Outpatient physical therapy 2x week for 13 weeks or 20 visits to include the following:  "  - Patient education  - Therapeutic exercise  - Manual therapy  - Performance testing   - Neuromuscular Re-education  - Therapeutic activity   - Modalities    Pt may be seen by PTA as part of the rehabilitation team.     Therapist: Phil Ortega, PT  6/30/2017    "I certify the need for these services furnished under this plan of treatment and while under my care."    ____________________________________  Physician/Referring Practitioner    _______________  Date of Signature          "

## 2017-06-30 NOTE — PROGRESS NOTES
Health  met with patient to complete initial FOTO outcomes for the [facility] lumbar program.  Questions were reviewed with patient and program benefits explained.  Patient's goals are to be able to improve her ability to walk and stand for longer periods.  She is interested in improving strength.

## 2017-06-30 NOTE — PLAN OF CARE
[facility] - PHYSICAL THERAPY EVALUATION     Name: Jayne Aponte  Clinic Number: [id number]    Jayne is a 89 y.o. female evaluated on 06/30/2017.   Time In: 10:30  Time out: 12:00    Diagnosis:   Encounter Diagnosis   Name Primary?    DDD (degenerative disc disease), lumbar Yes     Physician: Emeli Lopez, *  Treatment Orders: PT Eval and Treat    Past Medical History:   Diagnosis Date    Cancer      Current Outpatient Prescriptions   Medication Sig    meloxicam (MOBIC) 15 MG tablet Take 1 tablet (15 mg total) by mouth once daily.    metoprolol succinate (TOPROL-XL) 25 MG 24 hr tablet Take 25 mg by mouth once daily.    tizanidine (ZANAFLEX) 2 MG tablet Take 1 tablet (2 mg total) by mouth every 8 (eight) hours as needed.     No current facility-administered medications for this visit.      Review of patient's allergies indicates:  No Known Allergies  Precautions: Hx of cancer     Visit # authorized: 12  Authorization period: 8/30/2017  Plan of care Expiration: 9/28/2017      HISTORY     History of Present Illness: Ms Aponte is a 89 yo female here for evaluation of low back pain.  She has had low back pain for the past 20 years.  She feels like the back pain has gotten worse over time The pain is constant is the lower back.  The pain is worst with walking and in the morning.  She feels like she is insecure in walking, she feels like her back is hurting and so bending forward more and less able to walk. She has had injections in her back with no relief.  She did have a fall in January and she now feels unsteady on her feet.  The pain is a steady achy pain.  The pain is 4/10 now, worst 10/10 with walking, best 4/10 sitting and lying down.  She does have macular degeneration.  There is no leg pain.  She has a history of breast and colon cancer in the past.         She will take tylenol for the pain.  She does not take nsaids, because she feels like it effected her vision in the past.        Diagnostic Tests: From [facility] MRI  MRI lumbar 2016 report  Multilevel DDD and spondylosis [id number] without spinal stenosis or NF narrowing    Pain Scale: Jayne rates pain on a scale of 0-10 to be 10 at worst; 4 currently; 4 at best using VAS.   Pain location: low back and B hips    Aggravating factors: lifting, walking, morning, lying on stomach  Easing Factors: Sitting, leg exercises (stretches), Tylenol.   Disturbed Sleep: no    Patient reports that she is not taking Meloxicam nor Zanaflex.   Pattern of pain questions:  1.  Where is your pain the worst? Low back   2.  Is your pain constant or intermittent? Constant   3.  Does bending forward make your typical pain worse? Yes   4.  Since the start of your back pain, has there been a change in your bowel or bladder?  No  5.  What can't you do now that you use to be able to do? Work at [employer].     Prior Treatment: Physical Therapy for back, no real help.   Prior functional status: Independent  DME owned/used: none  Occupation:  Worked at [employer]   Leisure: watch TV                     Pts goals:  Decrease back pain and improve function.     Red Flag Screening:   Cough  Sneeze  Strain: No  Bladder/ bowel: No  Falls: Yes 1 in past 6 months  General health: Good  Night pain: No   Unexplained weight loss: No     OBJECTIVE     POSTURE  Posture Alignment :slouched posture  Postural examination/scapula alignment: Rounded shoulder, Head forward and Affected scapula elevated  Joint integrity: Lumbar spine hypomobile as seen through spring testing  Skin integrity: WNL   Edema: Not significant   Sitting: Poor  Standing: Poor  Correction of posture: Added slimline roll, Improved posture in sitting.     MOVEMENT LOSS    ROM Loss   Flexion major loss   Extension major loss   Side bending Right moderate loss   Side bending Left moderate loss   Rotation Right moderate loss   Rotation Left moderate loss     Lower Extremity Strength  Right LE  Left LE    Hip flexion: 5/5 Hip  flexion: 5/5   Hip extension:  5/5 Hip extension: 5/5   Hip abduction: 5/ Hip abduction:    Hip adduction:   Hip adduction:     Hip Internal rotation    Hip Internal rotation 5   Knee Flexion 5/ Knee Flexion 5/   Knee Extension 5/ Knee Extension    Ankle dorsiflexion:  Ankle dorsiflexion:    Ankle plantarflexion:  Ankle plantarflexion:        GAIT:  Assistive Device used: None  Level of Assistance: Independent  Patient displays the following gait deviations: Decreased gait speed, decreased stride length, unsteadiness     Special Tests:   Test Name  Test Result   Prone Instability Test NT   SI Joint Provocation Test (--)   Straight Leg Raise (+)   Neural Tension Test (--)   Crossed Straight Leg Raise (--)   Walking on toes (--)   Walking on heels  (--)       NEUROLOGICAL SCREENING     Sensory deficit: Tactile WNL    Reflexes:    Left Right   Patella Tendon 2+ 2+   Achilles Tendon 2+ 2+   Babinski NT NT   Clonus - -     REPEATED TEST MOVEMENTS:  Repeated Flexion in Standing end range pain   Repeated Extension in Standing end range pain   Repeated Flexion in lying worse independently, better with ball for support   Repeated Extension in lying  NT       STATIC TESTS   Sitting slouched  no effect   Sitting erect worse   Standing slouched no effect   Standing erect  no effect   Lying prone in extension  NT   Long sitting   NT     CHRISTIE - positive B  Hip scour - negative B  Leg length - equal B    Baseline Isometric Testing on Med X equipment: Testing administered by PT    Baseline IM Testing Results:   Date of testin2017  ROM 0-36 deg   Max Peak Torque 45    Min Peak Torque 12    Flex/Ext Ratio 3.75:1   % below normative data 68.25       FOTO: Focus on Therapeutic Outcomes   Category: lumbar   % Impaired: 52  Current Score  = CK = at least 40% but < 60% impaired, limited or restricted  Goal at Discharge Score = CJ = at least 20% but < 40% impaired, limited or restricted    Score  interpretation is as follows:     TEST SCORE  Modifier  Impairment Limitation Restriction    0/50  CH  0 % impaired, limited or restricted   1-9/50  CI  @ least 1% but less than 20% impaired, limited or restricted   10-19/50  CJ  @ least 20%<40% impaired, limited or restricted   20-29/50  CK  @ least 40%<60% impaired, limited or restricted   30-39/50  CL  @ least 60% <80% impaired, limited or restricted   40-49/50  CM  @ least 80%<100% impaired limited or restricted   50/50  CN  100% impaired, limited or restricted       Treatment   Time In: 11:30  Time Out: 12:00    PT Evaluation Completed? Yes  Discussed Plan of Care with patient: Yes      Written Home Exercises Provided:   Handouts were given to the patient. Pt demo good understanding of the education provided. Jayne demonstrated good return demonstration of activities.     - Patient received education regarding proper posture and body mechanics.    - Salma roll tried, recommended, and purchase information was provided.    - Patient received a handout regarding anticipated muscular soreness following the isometric test and strategies for management were reviewed with patient including stretching, using ice and scheduled rest.     HEP as follows    Flexion in lying 10x, 3x/day   PPT 10x, 3x/day   LTR 10x 3x/day  Pt was instructed in and performed the following:   Cardiovascular exercise and therapeutic exercise to improve posture, lumbar/cervical ROM, strength, and muscular endurance as follows:     Jayne received therapeutic exercises to develop/improve posture, lumbar/cervical ROM, strength and muscular endurance for 30 minutes including the following exercises: med-JouleX lumbar machine testing    [facility] 6/30/2017   Visit Number 1   VAS Pain Rating 4   Flexion in Lying 10   Lumbar Extension Seat Pad 1   Femur Restraint 4   Top Dead Center 24   Counterweight 101   Lumbar Flexion 36   Lumbar Extension 0   Lumbar Peak Torque 45   Min Torque 12    Percent From Norm -68.25   Ice - Z Lie (in min.) 10       Assessment   This is a 89 y.o. female referred to [facility] and presents with a medical diagnosis of   Encounter Diagnosis   Name Primary?    DDD (degenerative disc disease), lumbar Yes    and demonstrates limitations as described below in the problem list. Pt rehab potential is Good. Pt presents with lumbar dysfunction, poor posture, weakness.    Pain Pattern: 1 PEN - hinging at L3-4 with flexion, unable to tolerate prone exercises.        Patient received education on the [facility] program, purpose of the isometric test, progression of back strengthening as well as wellness approach and systemic strengthening.  Details of the program were discussed.  Reviewed that patient should feel support/pressure from med ex restraints but no pain or discomfort and patient expressed understanding.    Based on the above history and physical examination an active physical therapy program is recommended.  Pt will continue to benefit from skilled outpatient physical therapy to address the deficits listed below in the chart, provide pt/family education and to maximize pt's level of independence in the home and community environment. .     No environmental, cultural, spiritual, developmental or education needs expressed or noted    Medical necessity is demonstrated by the following problem list.    Pt presents with the following impairments:   History  Co-morbidities and personal factors that may impact the plan of care Examination  Body Structures and Functions, activity limitations and participation restrictions that may impact the plan of care Clinical Presentation   Decision Making/ Complexity Score   Co-morbidities:   advanced age        Personal Factors:   age Body Regions:   back    Body Systems:   gross symmetry  ROM  strength  gait  transfers  motor control    Activity limitations:   Learning and applying knowledge  no deficits    General Tasks and  Commands  no deficits    Communication  no deficits    Mobility  lifting and carrying objects  walking    Self care  no deficits    Domestic Life  shopping  cooking  doing house work (cleaning house, washing dishes, laundry)    Interactions/Relationships  no deficits    Life Areas  employment    Community and Social Life  no deficits    Participation Restrictions:   work     stable and uncomplicated   low         GOALS: Pt is in agreement with the following goals.    Short term goals:  6 weeks or 10 visits   1.  Pt will demonstrate increased lumbar ROM by at least 3 degrees from the initial ROM value with improvements noted in functional ROM and ability to perform ADLs  2.  Pt will demonstrate increased maximum isometric torque value by 5% when compared to the initial value resulting in improved ability to perform bending, lifting, and carrying activities safely, confidently.  3.  Patient report a reduction in worst pain score by 1-2 points for improved tolerance during work and recreational activities  4.  Pt able to perform HEP correctly with minimal cueing or supervision for therapist    Long term goals: 13 weeks or 20 visits   1. Pt will demonstrate increased lumbar ROM by at least 6 degrees from initial ROM value, resulting in improved ability to perform functional fwd bending while standing and sitting.   2. Pt will demonstrate increased maximum isometric torque value by 10% when compared to the initial value resulting in improved ability to perform bending, lifting, and carrying activities safely, confidently.  3. Pt to demonstrate ability to independently control and reduce their pain through posture positioning and mechanical movements throughout a typical day.  4.  Patient will demonstrate improved overall function per FOTO Survey to CJ = at least 20% but < 40% impaired, limited or restricted score or less.      Plan   Outpatient physical therapy 2x week for 13 weeks or 20 visits to include the following:  "  - Patient education  - Therapeutic exercise  - Manual therapy  - Performance testing   - Neuromuscular Re-education  - Therapeutic activity   - Modalities    Pt may be seen by PTA as part of the rehabilitation team.     Therapist: Phil Ortega, PT  6/30/2017    "I certify the need for these services furnished under this plan of treatment and while under my care."    ____________________________________  Physician/Referring Practitioner    _______________  Date of Signature            "

## 2017-07-10 ENCOUNTER — CLINICAL SUPPORT (OUTPATIENT)
Dept: REHABILITATION | Facility: OTHER | Age: 82
End: 2017-07-10
Attending: PHYSICAL MEDICINE & REHABILITATION
Payer: MEDICARE

## 2017-07-10 DIAGNOSIS — M51.36 DDD (DEGENERATIVE DISC DISEASE), LUMBAR: Primary | ICD-10-CM

## 2017-07-10 PROBLEM — M51.369 DDD (DEGENERATIVE DISC DISEASE), LUMBAR: Status: ACTIVE | Noted: 2017-07-10

## 2017-07-10 PROCEDURE — 97110 THERAPEUTIC EXERCISES: CPT

## 2017-07-10 NOTE — PROGRESS NOTES
[facility] Physical Therapy Treatment      Name: Jayne Aponte  Clinic Number: [id number]  Date of Treatment: 07/10/2017   Diagnosis:   Encounter Diagnosis   Name Primary?    DDD (degenerative disc disease), lumbar Yes     Physician: Emeli Lopez, *    Pain pattern determined: 1 PEN  Plan of care signed: 2017  Time in: 11:42  Time Out: 12:35  Total Treatment time: 45  Precautions: Hx of cancer  Visit #: 2      Subjective   Jayne reports no change in symptoms.  She states that she was very sore after the initial visit.  She states that her pain was increased for 5 days following her evaluation.  She states that she was getting some groin pain from her SKTC exercise.  She states that other exercises have helped.       Patient reports their pain to be 3/10 on a 0-10 scale with 0 being no pain and 10 being the worst pain imaginable.  Pain Location: R sided low back     Occupation:  Worked at [employer]   Leisure: watch TV                     Pts goals:  Decrease back pain and improve function.     Objective     Baseline IM Testing Results:   Date of testin2017  ROM 0-36 deg   Max Peak Torque 45    Min Peak Torque 12    Flex/Ext Ratio 3.75:1   % below normative data -68.25     FOTO: Focus on Therapeutic Outcomes: EVAL  Category: lumbar   % Impaired: 52  Current Score  = CK = at least 40% but < 60% impaired, limited or restricted  Goal at Discharge Score = CJ = at least 20% but < 40% impaired, limited or restricted    Score interpretation is as follows:    TEST SCORE  Modifier  Impairment Limitation Restriction    0/50  CH  0 % impaired, limited or restricted   1-9/50  CI  @ least 1% but less than 20% impaired, limited or restricted   10-19/50  CJ  @ least 20%<40% impaired, limited or restricted   20-29/50  CK  @ least 40%<60% impaired, limited or restricted   30-39/50  CL  @ least 60% <80% impaired, limited or restricted   40-49/50  CM  @ least 80%<100% impaired limited or restricted    50/50  CN  100% impaired, limited or restricted     REPEATED TEST MOVEMENTS: EVAL  Repeated Flexion in Standing end range pain   Repeated Extension in Standing end range pain   Repeated Flexion in lying worse independently, better with ball for support   Repeated Extension in lying  NT       Treatment    Pt was instructed in and performed the following:     Jayne received therapeutic exercises to develop/improved posture, cardiovascular endurance, muscular endurance, lumbar/cervical ROM, strength and muscular endurance for 40 minutes including the following exercises: lumbar med-x machine, torso rotation, knee extension, knee curls, and chest press (with 1# weights in supine).     PPT in hookying 10  DKTC c ball 10  LTR c ball 10  [facility] 7/10/2017   Visit Number 2   VAS Pain Rating 3   Flexion in Lying 10   Lumbar Extension Seat Pad -   Femur Restraint -   Top Dead Center -   Counterweight -   Lumbar Flexion -   Lumbar Extension -   Lumbar Peak Torque -   Min Torque -   Percent From Norm -   Lumbar Weight 20   Repetitions 15   Rating of Perceived Exertion 3   Ice - Z Lie (in min.) 10       Home Exercise Program as follows:   Handouts were given to the patient. Pt demo good understanding of the education provided. Jayne demonstrated good return demonstration of activities.                           Flexion in lying 10x, 3x/day                        PPT 10x, 3x/day                        LTR 10x 3x/day  Lumbar roll use compliance: no  Additional exercises taught this treatment session:   Reviewed HEP    Assessment     Patient tolerated tx well.   Patient was able to perform med-x lumbar exercise with starting weight that was slightly less than 50% of her max peak torque due to increased soreness following IE.  Reviewed HEP with patient   Patient was able to perform exercises with moderate vc's for technique and recall. Patient was instructed to stop performing sigle knee to chest exercise which she  was doing on her own at home due to increased groin and hip pain with exercise.  Patient performed all peripheral resistance machines with no increases in symptoms.  Patient finished with ice in z-lie position.  Patient reported no change in symptoms upon leaving.     Patient is making good progress towards established goals.  Pt will continue to benefit from skilled outpatient physical therapy to address the deficits stated in the impairment chart, provide pt/family education and to maximize pt's level of independence in the home and community environment.       Pt's spiritual, cultural and educational needs considered and pt agreeable to plan of care and goals as stated below:     Medical necessity is demonstrated by the following IMPAIRMENTS/PROBLEMS:    Pt presents with the following impairments:   History  Co-morbidities and personal factors that may impact the plan of care Examination  Body Structures and Functions, activity limitations and participation restrictions that may impact the plan of care Clinical Presentation Decision Making/ Complexity Score   Co-morbidities:   advanced age           Personal Factors:   age Body Regions:   back     Body Systems:   gross symmetry  ROM  strength  gait  transfers  motor control     Activity limitations:   Learning and applying knowledge  no deficits     General Tasks and Commands  no deficits     Communication  no deficits     Mobility  lifting and carrying objects  walking     Self care  no deficits     Domestic Life  shopping  cooking  doing house work (cleaning house, washing dishes, laundry)     Interactions/Relationships  no deficits     Life Areas  employment     Community and Social Life  no deficits     Participation Restrictions:   work    stable and uncomplicated    low                Goals:   Short term goals:  6 weeks or 10 visits   1.  Pt will demonstrate increased lumbar ROM by at least 3 degrees from the initial ROM value with improvements noted in  functional ROM and ability to perform ADLs (progressing, not met)  2.  Pt will demonstrate increased maximum isometric torque value by 5% when compared to the initial value resulting in improved ability to perform bending, lifting, and carrying activities safely, confidently. (progressing, not met)  3.  Patient report a reduction in worst pain score by 1-2 points for improved tolerance during work and recreational activities (progressing, not met)  4.  Pt able to perform HEP correctly with minimal cueing or supervision for therapist. (progressing, not met)     Long term goals: 13 weeks or 20 visits   1. Pt will demonstrate increased lumbar ROM by at least 6 degrees from initial ROM value, resulting in improved ability to perform functional fwd bending while standing and sitting. (progressing, not met)  2. Pt will demonstrate increased maximum isometric torque value by 10% when compared to the initial value resulting in improved ability to perform bending, lifting, and carrying activities safely, confidently. (progressing, not met)  3. Pt to demonstrate ability to independently control and reduce their pain through posture positioning and mechanical movements throughout a typical day. (progressing, not met)  4.  Patient will demonstrate improved overall function per FOTO Survey to CJ = at least 20% but < 40% impaired, limited or restricted score or less. (progressing, not met)      Plan   Continue with established Plan of Care towards established PT goals.

## 2017-07-13 ENCOUNTER — CLINICAL SUPPORT (OUTPATIENT)
Dept: REHABILITATION | Facility: OTHER | Age: 82
End: 2017-07-13
Attending: PHYSICAL MEDICINE & REHABILITATION
Payer: MEDICARE

## 2017-07-13 DIAGNOSIS — M51.36 DDD (DEGENERATIVE DISC DISEASE), LUMBAR: ICD-10-CM

## 2017-07-13 PROCEDURE — 97110 THERAPEUTIC EXERCISES: CPT

## 2017-07-13 NOTE — PROGRESS NOTES
[facility]      Name: Jayne Aponte  Clinic Number: [id number]  Date of Treatment: 2017   Diagnosis:   Encounter Diagnosis   Name Primary?    DDD (degenerative disc disease), lumbar      Physician: Emeli Lopez, *    Pain pattern determined: 1 PEN  Plan of care signed: 2017  Time in: 12:30  Time Out: 1:30  Total Treatment time: 45  Precautions: Hx of cancer  Visit #: 3      Subjective   Jayne reports no change in symptoms.  She states that she has pain in the mornings that is unchanged.  She knows that it is still early in her treatment so she believes that she will improve.    Patient reports their pain to be 3/10 on a 0-10 scale with 0 being no pain and 10 being the worst pain imaginable.  Pain Location: R sided low back     Occupation:  Worked at [employer]   Leisure: watch TV                     Pts goals:  Decrease back pain and improve function.     Objective     Baseline IM Testing Results:   Date of testin2017  ROM 0-36 deg   Max Peak Torque 45    Min Peak Torque 12    Flex/Ext Ratio 3.75:1   % below normative data -68.25     FOTO: Focus on Therapeutic Outcomes: EVAL  Category: lumbar   % Impaired: 52  Current Score  = CK = at least 40% but < 60% impaired, limited or restricted  Goal at Discharge Score = CJ = at least 20% but < 40% impaired, limited or restricted    Score interpretation is as follows:    TEST SCORE  Modifier  Impairment Limitation Restriction    0/50  CH  0 % impaired, limited or restricted   1-9/50  CI  @ least 1% but less than 20% impaired, limited or restricted   10-19/50  CJ  @ least 20%<40% impaired, limited or restricted   20-29/50  CK  @ least 40%<60% impaired, limited or restricted   30-39/50  CL  @ least 60% <80% impaired, limited or restricted   40-49/50  CM  @ least 80%<100% impaired limited or restricted   50/50  CN  100% impaired, limited or restricted     REPEATED TEST MOVEMENTS: EVAL  Repeated Flexion in Standing  end range pain   Repeated Extension in Standing end range pain   Repeated Flexion in lying worse independently, better with ball for support   Repeated Extension in lying  NT       Treatment    Pt was instructed in and performed the following:     Jayne received therapeutic exercises to develop/improved posture, cardiovascular endurance, muscular endurance, lumbar/cervical ROM, strength and muscular endurance for 40 minutes including the following exercises: lumbar med-x machine, torso rotation, knee extension, knee curls, and chest press (with 1# weights in supine).     PPT in hookying 10  DKTC c ball 10  LTR c ball 10  SLR c TA contraction    [facility] 7/13/2017   Visit Number 3   VAS Pain Rating 3   Flexion in Lying 10   Lumbar Extension Seat Pad -   Femur Restraint -   Top Dead Center -   Counterweight -   Lumbar Flexion -   Lumbar Extension -   Lumbar Peak Torque -   Min Torque -   Percent From Norm -   Lumbar Weight 20   Repetitions 20   Rating of Perceived Exertion 3   Ice - Z Lie (in min.) 10       Home Exercise Program as follows:   Handouts were given to the patient. Pt demo good understanding of the education provided. Jayne demonstrated good return demonstration of activities.                           Flexion in lying 10x, 3x/day                        PPT 10x, 3x/day                        LTR 10x 3x/day    Lumbar roll use compliance: no  Additional exercises taught this treatment session:   SLR c TA contraction    Assessment     Patient tolerated tx well.   Patient was able to perform med-x lumbar exercise with same resistance.  Reviewed HEP with patient   Patient was able to perform exercises with moderate vc's for technique and recall. Introduced SLR c TA contraction which increased discomfort in hips but patient was able to do all reps. Patient performed all peripheral resistance machines with no increases in symptoms.  Patient finished with ice in z-lie position.  Patient reported no  change in symptoms upon leaving.     Patient is making good progress towards established goals.  Pt will continue to benefit from skilled outpatient physical therapy to address the deficits stated in the impairment chart, provide pt/family education and to maximize pt's level of independence in the home and community environment.       Pt's spiritual, cultural and educational needs considered and pt agreeable to plan of care and goals as stated below:     Medical necessity is demonstrated by the following IMPAIRMENTS/PROBLEMS:    Pt presents with the following impairments:   History  Co-morbidities and personal factors that may impact the plan of care Examination  Body Structures and Functions, activity limitations and participation restrictions that may impact the plan of care Clinical Presentation Decision Making/ Complexity Score   Co-morbidities:   advanced age           Personal Factors:   age Body Regions:   back     Body Systems:   gross symmetry  ROM  strength  gait  transfers  motor control     Activity limitations:   Learning and applying knowledge  no deficits     General Tasks and Commands  no deficits     Communication  no deficits     Mobility  lifting and carrying objects  walking     Self care  no deficits     Domestic Life  shopping  cooking  doing house work (cleaning house, washing dishes, laundry)     Interactions/Relationships  no deficits     Life Areas  employment     Community and Social Life  no deficits     Participation Restrictions:   work    stable and uncomplicated    low                Goals:   Short term goals:  6 weeks or 10 visits   1.  Pt will demonstrate increased lumbar ROM by at least 3 degrees from the initial ROM value with improvements noted in functional ROM and ability to perform ADLs (progressing, not met)  2.  Pt will demonstrate increased maximum isometric torque value by 5% when compared to the initial value resulting in improved ability to perform bending, lifting, and  carrying activities safely, confidently. (progressing, not met)  3.  Patient report a reduction in worst pain score by 1-2 points for improved tolerance during work and recreational activities (progressing, not met)  4.  Pt able to perform HEP correctly with minimal cueing or supervision for therapist. (progressing, not met)     Long term goals: 13 weeks or 20 visits   1. Pt will demonstrate increased lumbar ROM by at least 6 degrees from initial ROM value, resulting in improved ability to perform functional fwd bending while standing and sitting. (progressing, not met)  2. Pt will demonstrate increased maximum isometric torque value by 10% when compared to the initial value resulting in improved ability to perform bending, lifting, and carrying activities safely, confidently. (progressing, not met)  3. Pt to demonstrate ability to independently control and reduce their pain through posture positioning and mechanical movements throughout a typical day. (progressing, not met)  4.  Patient will demonstrate improved overall function per FOTO Survey to CJ = at least 20% but < 40% impaired, limited or restricted score or less. (progressing, not met)      Plan   Continue with established Plan of Care towards established PT goals.

## 2017-07-19 ENCOUNTER — TELEPHONE (OUTPATIENT)
Dept: SPINE | Facility: CLINIC | Age: 82
End: 2017-07-19

## 2017-07-19 NOTE — TELEPHONE ENCOUNTER
Called spoke with patient she wants her records send to outside provider explain to her that she would have to sign TD in order to send her information patient [place] stated that when she comes for her visit next week she will sign one. ----- Message from Jannette Castañeda sent at 7/19/2017 12:03 PM CDT -----  x_  1st Request  _  2nd Request  _  3rd Request        Who: ambika    Why: pt. Wants to speak  assistant regarding xray.please call to discuss    What Number to Call Back:[phone]    When to Expect a call back: (With in 24 hours)

## 2017-07-27 ENCOUNTER — OFFICE VISIT (OUTPATIENT)
Dept: SPINE | Facility: CLINIC | Age: 82
End: 2017-07-27
Attending: PHYSICAL MEDICINE & REHABILITATION
Payer: MEDICARE

## 2017-07-27 ENCOUNTER — CLINICAL SUPPORT (OUTPATIENT)
Dept: REHABILITATION | Facility: OTHER | Age: 82
End: 2017-07-27
Attending: PHYSICAL MEDICINE & REHABILITATION
Payer: MEDICARE

## 2017-07-27 VITALS
HEIGHT: 65 IN | BODY MASS INDEX: 19.49 KG/M2 | HEART RATE: 66 BPM | DIASTOLIC BLOOD PRESSURE: 61 MMHG | SYSTOLIC BLOOD PRESSURE: 133 MMHG | WEIGHT: 117 LBS

## 2017-07-27 DIAGNOSIS — M47.816 SPONDYLOSIS OF LUMBAR REGION WITHOUT MYELOPATHY OR RADICULOPATHY: ICD-10-CM

## 2017-07-27 DIAGNOSIS — G89.29 CHRONIC LEFT-SIDED LOW BACK PAIN WITHOUT SCIATICA: Primary | ICD-10-CM

## 2017-07-27 DIAGNOSIS — M51.36 DDD (DEGENERATIVE DISC DISEASE), LUMBAR: ICD-10-CM

## 2017-07-27 DIAGNOSIS — M54.50 CHRONIC LEFT-SIDED LOW BACK PAIN WITHOUT SCIATICA: Primary | ICD-10-CM

## 2017-07-27 DIAGNOSIS — M51.36 DDD (DEGENERATIVE DISC DISEASE), LUMBAR: Primary | ICD-10-CM

## 2017-07-27 DIAGNOSIS — M62.838 MUSCLE SPASM: ICD-10-CM

## 2017-07-27 PROCEDURE — 97110 THERAPEUTIC EXERCISES: CPT

## 2017-07-27 PROCEDURE — 20552 NJX 1/MLT TRIGGER POINT 1/2: CPT | Mod: S$GLB,,, | Performed by: PHYSICAL MEDICINE & REHABILITATION

## 2017-07-27 PROCEDURE — 1125F AMNT PAIN NOTED PAIN PRSNT: CPT | Mod: S$GLB,,, | Performed by: PHYSICAL MEDICINE & REHABILITATION

## 2017-07-27 PROCEDURE — 99214 OFFICE O/P EST MOD 30 MIN: CPT | Mod: 25,S$GLB,, | Performed by: PHYSICAL MEDICINE & REHABILITATION

## 2017-07-27 PROCEDURE — 99999 PR PBB SHADOW E&M-EST. PATIENT-LVL III: CPT | Mod: PBBFAC,,, | Performed by: PHYSICAL MEDICINE & REHABILITATION

## 2017-07-27 PROCEDURE — 1159F MED LIST DOCD IN RCRD: CPT | Mod: S$GLB,,, | Performed by: PHYSICAL MEDICINE & REHABILITATION

## 2017-07-27 RX ORDER — TRIAMCINOLONE ACETONIDE 40 MG/ML
40 INJECTION, SUSPENSION INTRA-ARTICULAR; INTRAMUSCULAR ONCE
Status: COMPLETED | OUTPATIENT
Start: 2017-07-27 | End: 2017-07-27

## 2017-07-27 RX ADMIN — TRIAMCINOLONE ACETONIDE 40 MG: 40 INJECTION, SUSPENSION INTRA-ARTICULAR; INTRAMUSCULAR at 12:07

## 2017-07-27 NOTE — PROGRESS NOTES
[facility]      Name: Jayne Aponte  Clinic Number: [id number]  Date of Treatment: 2017   Diagnosis:   Encounter Diagnosis   Name Primary?    DDD (degenerative disc disease), lumbar Yes     Physician: Emeli Lopez, *    Pain pattern determined: 1 PEN  Plan of care signed: 2017  Time in: 12:30  Time Out: 1:40  Total Treatment time: 45  Precautions: Hx of cancer  Visit #: 5    Face to Face discussion of patient was done between PT and PTA.     Subjective   Jayne reports minimal L side LBP today. She received L side injections from Dr Jessica allen and she feels a little better.She states that she has pain in the mornings that is unchanged.  She knows that it is still early in her treatment so she believes that she will improve.    Patient reports their pain to be 2/10 on a 0-10 scale with 0 being no pain and 10 being the worst pain imaginable.  Pain Location: R sided low back     Occupation:  Worked at [employer]   Leisure: watch TV                     Pts goals:  Decrease back pain and improve function.     Objective     Baseline IM Testing Results:   Date of testin2017  ROM 0-36 deg   Max Peak Torque 45    Min Peak Torque 12    Flex/Ext Ratio 3.75:1   % below normative data -68.25     FOTO: Focus on Therapeutic Outcomes: EVAL  Category: lumbar   % Impaired: 52  Current Score  = CK = at least 40% but < 60% impaired, limited or restricted  Goal at Discharge Score = CJ = at least 20% but < 40% impaired, limited or restricted    Score interpretation is as follows:    TEST SCORE  Modifier  Impairment Limitation Restriction    0/50  CH  0 % impaired, limited or restricted   1-9/50  CI  @ least 1% but less than 20% impaired, limited or restricted   10-19/50  CJ  @ least 20%<40% impaired, limited or restricted   20-29/50  CK  @ least 40%<60% impaired, limited or restricted   30-39/50  CL  @ least 60% <80% impaired, limited or restricted   40-49/50  CM  @ least  80%<100% impaired limited or restricted   50/50  CN  100% impaired, limited or restricted     REPEATED TEST MOVEMENTS: EVAL  Repeated Flexion in Standing end range pain   Repeated Extension in Standing end range pain   Repeated Flexion in lying worse independently, better with ball for support   Repeated Extension in lying  NT       Treatment    Pt was instructed in and performed the following:     Jayne received therapeutic exercises to develop/improved posture, cardiovascular endurance, muscular endurance, lumbar/cervical ROM, strength and muscular endurance for 40 minutes including the following exercises: lumbar med-x machine, torso rotation ROM only, knee extension, knee curls, and chest press (with 1# weights in supine).   [facility] 7/27/2017   Visit Number 4   VAS Pain Rating 2   Recumbent Bike Seat Pos. 10   Time 10   Flexion in Lying 10   Lumbar Weight 22   Repetitions 18   Rating of Perceived Exertion 3   Ice - Z Lie (in min.) 10       PPT in hookying 10  DKTC c ball 10  LTR c ball 10  SLR c TA contraction    Home Exercise Program as follows:   Handouts were given to the patient. Pt demo good understanding of the education provided. Jayne demonstrated good return demonstration of activities.                           Flexion in lying 10x, 3x/day                        PPT 10x, 3x/day                        LTR 10x 3x/day    Lumbar roll use compliance: no  Additional exercises taught this treatment session:   SLR c TA contraction    Assessment     Patient tolerated tx well.   Unable to increase the medx machine 5% due to the weight was difficulty, so it was decreased.  Reviewed HEP with patient   Patient was able to perform exercises with moderate vc's for technique and recall.  Patient performed all peripheral resistance machines with no increases in symptoms.  Patient finished with ice in z-lie position.  Patient reported no change in symptoms upon leaving. 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 She tired a cane in the clinic and liked it.She will purchase one and bring it to the clinic to be adjusted. She understands the cane will not save her from falling, but could help her guide her through a path of objects.     Patient is making good progress towards established goals.  Pt will continue to benefit from skilled outpatient physical therapy to address the deficits stated in the impairment chart, provide pt/family education and to maximize pt's level of independence in the home and community environment.       Pt's spiritual, cultural and educational needs considered and pt agreeable to plan of care and goals as stated below:     Medical necessity is demonstrated by the following IMPAIRMENTS/PROBLEMS:    Pt presents with the following impairments:   History  Co-morbidities and personal factors that may impact the plan of care Examination  Body Structures and Functions, activity limitations and participation restrictions that may impact the plan of care Clinical Presentation Decision Making/ Complexity Score   Co-morbidities:   advanced age           Personal Factors:   age Body Regions:   back     Body Systems:   gross symmetry  ROM  strength  gait  transfers  motor control     Activity limitations:   Learning and applying knowledge  no deficits     General Tasks and Commands  no deficits     Communication  no deficits     Mobility  lifting and carrying objects  walking     Self care  no deficits     Domestic Life  shopping  cooking  doing house work (cleaning house, washing dishes, laundry)     Interactions/Relationships  no deficits     Life Areas  employment     Community and Social Life  no deficits     Participation Restrictions:   work    stable and uncomplicated    low                Goals:   Short term goals:  6 weeks or 10 visits   1.  Pt will demonstrate increased lumbar ROM by at least 3 degrees from the initial ROM value with improvements  noted in functional ROM and ability to perform ADLs (progressing, not met)  2.  Pt will demonstrate increased maximum isometric torque value by 5% when compared to the initial value resulting in improved ability to perform bending, lifting, and carrying activities safely, confidently. (progressing, not met)  3.  Patient report a reduction in worst pain score by 1-2 points for improved tolerance during work and recreational activities (progressing, not met)  4.  Pt able to perform HEP correctly with minimal cueing or supervision for therapist. (progressing, not met)     Long term goals: 13 weeks or 20 visits   1. Pt will demonstrate increased lumbar ROM by at least 6 degrees from initial ROM value, resulting in improved ability to perform functional fwd bending while standing and sitting. (progressing, not met)  2. Pt will demonstrate increased maximum isometric torque value by 10% when compared to the initial value resulting in improved ability to perform bending, lifting, and carrying activities safely, confidently. (progressing, not met)  3. Pt to demonstrate ability to independently control and reduce their pain through posture positioning and mechanical movements throughout a typical day. (progressing, not met)  4.  Patient will demonstrate improved overall function per FOTO Survey to CJ = at least 20% but < 40% impaired, limited or restricted score or less. (progressing, not met)      Plan   Continue with established Plan of Care towards established PT goals.

## 2017-07-27 NOTE — PROGRESS NOTES
Subjective:      Patient ID: Jayne Aponte is a 89 y.o. female.    Chief Complaint: Low-back Pain    Ms Aponte is a 89 yo female here for follow up of her low back pain that has been present for the past 20 years, but worsened in early june.  She was last seen by me on 6/13/2017 and she was having arm weakness so ordered X-ray lumbar and cervical spine and MRI of the cervical spine.  She was also going to start PT, she has been to 3 visits.  She could not take the mobic, it made her dizzy and she feels like it messed up her vision.  She did not take the tizanidine.  She was scared to take the med.  She will take tylenol one or 2 a day.  She does not take it all of the time.  The pain is on the left side of the lower back and sometimes goes down the left leg.  The pain is mainly with walking.  She feels unsteady.  She does not use a cane.  She has not fallen since fall in January.  She feels like she will have a shakiness.  She has days where she does not want to leave the house.  She feels like the pain is 3-6/10 She feels like the pain is an aggravation    MRI lumbar 2016 report  Multilevel DDD and spondylosis [id number] without spinal stenosis or NF narrowing       MRI cervical 6/2017  Findings: There is no evidence of fracture, dislocation or marrow replacement process.  The vertebral body heights and alignment are maintained.  The visualized posterior fossa structures demonstrate no significant abnormality.  The surrounding soft tissue structures demonstrate no significant abnormality.  The cervical cord signal is normal.    There is mild arthropathy at the atlanto-axial axial joint.    C2-3: Mild uncovertebral hypertrophy and minimal epidural lipomatosis. This abuts the cord posteriorly, but there is no significant canal stenosis.    C3-4: Uncovertebral hypertrophy. Mild epidural lipomatosis. No compressive sequelae.    C4-5: Uncovertebral hypertrophy. Grade 1 (2 mm) anterolisthesis C4 on C5. Mild ligamentum  flavum thickening. No compressive sequelae.    C5-6: Intervertebral disc height loss, uncovertebral hypertrophy, and broad based disc bulge. There is also moderate ligamentum flavum thickening which results in mild-moderate canal stenosis and mild right and moderate left neuroforaminal narrowing.    C6-7: Uncovertebral hypertrophy. Grade 1 (1 mm) anterolisthesis C6 on C7. Moderate ligamentum flavum thickening. No significant canal stenosis or neuroforaminal narrowing.    C7-T1: No significant degenerative changes.  Impression       Degenerative changes of the cervical spine, most notable at C5-C6 as detailed above.    Past Medical History:  No date: Cancer    Past Surgical History:  No date: BREAST SURGERY    No family history on file.      Social History    Marital status:             Spouse name:                       Years of education:                 Number of children:               Social History Main Topics    Smoking status: Never Smoker                                                                Alcohol use: No                Current Outpatient Prescriptions:  meloxicam (MOBIC) 15 MG tablet, Take 1 tablet (15 mg total) by mouth once daily., Disp: 30 tablet, Rfl: 1  metoprolol succinate (TOPROL-XL) 25 MG 24 hr tablet, Take 25 mg by mouth once daily., Disp: , Rfl:   tizanidine (ZANAFLEX) 2 MG tablet, Take 1 tablet (2 mg total) by mouth every 8 (eight) hours as needed., Disp: 90 tablet, Rfl: 2    No current facility-administered medications for this visit.       Review of patient's allergies indicates:  No Known Allergies          Review of Systems   Constitution: Negative for weight gain and weight loss.   Cardiovascular: Negative for chest pain.   Respiratory: Negative for shortness of breath.    Musculoskeletal: Positive for back pain. Negative for joint pain and joint swelling.   Gastrointestinal: Negative for abdominal pain and bowel incontinence.   Genitourinary: Negative for bladder  incontinence.   Neurological: Positive for paresthesias (weird feeling in arms). Negative for numbness.         Objective:        General: Jayne is well-developed, well-nourished, appears stated age, in no acute distress, alert and oriented to time, place and person.     General    Vitals reviewed.  Constitutional: She is oriented to person, place, and time. She appears well-developed and well-nourished.   HENT:   Head: Normocephalic and atraumatic.   Pulmonary/Chest: Effort normal.   Neurological: She is alert and oriented to person, place, and time.   Psychiatric: She has a normal mood and affect. Her behavior is normal. Judgment and thought content normal.     General Musculoskeletal Exam   Gait: normal     Right Ankle/Foot Exam     Tests   Heel Walk: able to perform  Tiptoe Walk: able to perform    Left Ankle/Foot Exam     Tests   Heel Walk: able to perform  Tiptoe Walk: able to perform  Back (L-Spine & T-Spine) / Neck (C-Spine) Exam     Tenderness Left paramedian tenderness of the Lower L-Spine and Upper L-Spine.     Back (L-Spine & T-Spine) Range of Motion   Extension: 10   Flexion: 60   Lateral Bend Right: 10   Lateral Bend Left: 10   Rotation Right: 30   Rotation Left: 30     Neck (C-Spine) Range of Motion   Flexion:     40  Extension: 40    Spinal Sensation   Right Side Sensation  C-Spine Level: normal   L-Spine Level: normal  S-Spine Level: normal  Left Side Sensation  C-Spine Level: normal  L-Spine Level: normal  S-Spine Level: normal    Back (L-Spine & T-Spine) Tests   Right Side Tests  Straight leg raise:      Sitting SLR: > 70 degrees      Left Side Tests  Straight leg raise:     Sitting SLR: > 70 degrees          Other She has scoliosis .  Spinal Kyphosis:  Absent    Comments:  Left quadratus lumborum trigger points      Right Hand/Wrist Exam     Tests     Atrophy   Thenar:  positive  Intrinsic:  positive  1st Dorsal Interosseous: positive      Left Hand/Wrist Exam     Tests     Atrophy  Thenar:   positive  Intrinsic: positive  1st Dorsal Interosseous:  positive          Muscle Strength   Right Upper Extremity   Biceps: 5/5/5   Deltoid:  5/5  Triceps:  5/5  Wrist Extension: 5/5/5   Finger Flexors:  5/5  Left Upper Extremity  Biceps: 5/5/5   Deltoid:  5/5  Triceps:  5/5  Wrist Extension: 5/5/5   Finger Flexors:  5/5  Right Lower Extremity   Hip Flexion: 5/5   Quadriceps:  5/5   Anterior tibial:  5/5/5  EHL:  5/5  Left Lower Extremity   Hip Flexion: 5/5   Quadriceps:  5/5   Anterior tibial:  5/5/5   EHL:  5/5    Reflexes     Left Side  Biceps:  2+  Triceps:  2+  Brachioradialis:  2+  Quadriceps:  2+  Achilles:  2+  Left Farooq's Sign:  Absent  Babinski Sign:  absent    Right Side   Biceps:  2+  Triceps:  2+  Brachioradialis:  2+  Quadriceps:  2+  Achilles:  2+  Right Farooq's Sign:  absent  Babinski Sign:  absent    Vascular Exam     Right Pulses        Carotid:                  2+    Left Pulses        Carotid:                  2+              Assessment:       1. Chronic left-sided low back pain without sciatica    2. Muscle spasm    3. DDD (degenerative disc disease), lumbar    4. Spondylosis of lumbar region without myelopathy or radiculopathy           Plan:       Orders Placed This Encounter    INJECT TRIGGER POINT, 1 OR 2    triamcinolone acetonide injection 40 mg     1.  X-ray cervical and lumbar was reviewed with her  2.  MRI cervical spine was reviewed with her.  She feels like her neck pain is not bothering her  3.  Continue PT for low back and progressive resistance exercise pattern 1, she has only been to 3 visits  4.  Did not tolerate mobic 15mg po Qday  5. Did not try Tizanidine 2mg po TID as needed  6.  Tylenol 2 pills BID  7.  Trigger point injection/ A time out was performed, the correct patient, procedure, site, and position confirmed.      left trigger point injection: quadratus lumborum x 3: the risks and benefits were explained verbal consent was obtained.  A time out was taken.  she  was then placed in seated position the trigger points were located and prepped with alcohol.  she was then injected with a 22 gauge needle with 40mg kenolog and 2 cc of 2% lidocaine, dispensed equally between each spot.  There were no complications, she felt some immediate relief of the pain.  8.  We discussed using a cane so she feels more stable and more able to get out  9.  rtc 8-10 weeks      Follow-up: No Follow-up on file. If there are any questions prior to this, the patient was instructed to contact the office.

## 2017-08-07 ENCOUNTER — CLINICAL SUPPORT (OUTPATIENT)
Dept: REHABILITATION | Facility: OTHER | Age: 82
End: 2017-08-07
Attending: PHYSICAL MEDICINE & REHABILITATION
Payer: MEDICARE

## 2017-08-07 DIAGNOSIS — M51.36 DDD (DEGENERATIVE DISC DISEASE), LUMBAR: Primary | ICD-10-CM

## 2017-08-07 PROCEDURE — 97110 THERAPEUTIC EXERCISES: CPT | Performed by: PHYSICAL THERAPIST

## 2017-08-07 NOTE — PROGRESS NOTES
[facility]      Name: Jayne Aponte  Clinic Number: [id number]  Date of Treatment: 2017   Diagnosis:   Encounter Diagnosis   Name Primary?    DDD (degenerative disc disease), lumbar Yes     Physician: Emeli Lopez, *    Pain pattern determined: 1 PEN  Plan of care signed: 2017  Time in: 12:30  Time Out: 1:40  Total Treatment time: 45  Precautions: Hx of cancer  Visit #: 5    Face to Face discussion of patient was done between PT and PTA.     Subjective   Jayne reports minimal L side LBP today. She received L side injections from Dr Jessica allen and she feels a little better.She states that she has pain in the mornings that is unchanged.  She is feeling a little off today, a little fatigued.     Patient reports their pain to be 2/10 on a 0-10 scale with 0 being no pain and 10 being the worst pain imaginable.  Pain Location: R sided low back     Occupation:  Worked at [employer]   Leisure: watch TV                     Pts goals:  Decrease back pain and improve function.     Objective     Baseline IM Testing Results:   Date of testin2017  ROM 0-36 deg   Max Peak Torque 45    Min Peak Torque 12    Flex/Ext Ratio 3.75:1   % below normative data -68.25     FOTO: Focus on Therapeutic Outcomes: EVAL  Category: lumbar   % Impaired: 52  Current Score  = CK = at least 40% but < 60% impaired, limited or restricted  Goal at Discharge Score = CJ = at least 20% but < 40% impaired, limited or restricted    Score interpretation is as follows:    TEST SCORE  Modifier  Impairment Limitation Restriction    0/50  CH  0 % impaired, limited or restricted   1-9/50  CI  @ least 1% but less than 20% impaired, limited or restricted   10-19/50  CJ  @ least 20%<40% impaired, limited or restricted   20-29/50  CK  @ least 40%<60% impaired, limited or restricted   30-39/50  CL  @ least 60% <80% impaired, limited or restricted   40-49/50  CM  @ least 80%<100% impaired limited or  restricted   50/50  CN  100% impaired, limited or restricted     REPEATED TEST MOVEMENTS: EVAL  Repeated Flexion in Standing end range pain   Repeated Extension in Standing end range pain   Repeated Flexion in lying worse independently, better with ball for support   Repeated Extension in lying  NT       Treatment    Pt was instructed in and performed the following:     Jayne received therapeutic exercises to develop/improved posture, cardiovascular endurance, muscular endurance, lumbar/cervical ROM, strength and muscular endurance for 40 minutes including the following exercises: lumbar med-x machine, torso rotation ROM only, knee extension, knee curls, and chest press (with 1# weights in supine).     [facility] 8/7/2017   Visit Number 5   VAS Pain Rating 2   Recumbent Bike Seat Pos. 10   Time 10   Flexion in Lying 10   Lumbar Weight 22   Repetitions 20   Rating of Perceived Exertion 5   Ice - Z Lie (in min.) 10           PPT in hookying 10  DKTC c ball 10  LTR c ball 10  SLR c TA contraction    Home Exercise Program as follows:   Handouts were given to the patient. Pt demo good understanding of the education provided. Jayne demonstrated good return demonstration of activities.                           Flexion in lying 10x, 3x/day                        PPT 10x, 3x/day                        LTR 10x 3x/day    Lumbar roll use compliance: no  Additional exercises taught this treatment session:   SLR c TA contraction    Assessment     Patient tolerated tx well.  MEd X machine weight remains at 22 ftlbs, 20 reps and RPE 5.   Reviewed HEP with patient   Patient was able to perform exercises with moderate vc's for technique and recall.  Patient performed all peripheral resistance machines with no increases in symptoms.  Patient finished with ice in z-lie position.  Patient reported no change in symptoms upon leaving. She will purchase one and bring it to the clinic to be adjusted. She understands the cane  will not save her from falling, but could help her guide her through a path of objects.     Patient is making good progress towards established goals.  Pt will continue to benefit from skilled outpatient physical therapy to address the deficits stated in the impairment chart, provide pt/family education and to maximize pt's level of independence in the home and community environment.       Pt's spiritual, cultural and educational needs considered and pt agreeable to plan of care and goals as stated below:     Medical necessity is demonstrated by the following IMPAIRMENTS/PROBLEMS:    Pt presents with the following impairments:   History  Co-morbidities and personal factors that may impact the plan of care Examination  Body Structures and Functions, activity limitations and participation restrictions that may impact the plan of care Clinical Presentation Decision Making/ Complexity Score   Co-morbidities:   advanced age           Personal Factors:   age Body Regions:   back     Body Systems:   gross symmetry  ROM  strength  gait  transfers  motor control     Activity limitations:   Learning and applying knowledge  no deficits     General Tasks and Commands  no deficits     Communication  no deficits     Mobility  lifting and carrying objects  walking     Self care  no deficits     Domestic Life  shopping  cooking  doing house work (cleaning house, washing dishes, laundry)     Interactions/Relationships  no deficits     Life Areas  employment     Community and Social Life  no deficits     Participation Restrictions:   work    stable and uncomplicated    low                Goals:   Short term goals:  6 weeks or 10 visits   1.  Pt will demonstrate increased lumbar ROM by at least 3 degrees from the initial ROM value with improvements noted in functional ROM and ability to perform ADLs (progressing, not met)  2.  Pt will demonstrate increased maximum isometric torque value by 5% when compared to the initial value  resulting in improved ability to perform bending, lifting, and carrying activities safely, confidently. (progressing, not met)  3.  Patient report a reduction in worst pain score by 1-2 points for improved tolerance during work and recreational activities (progressing, not met)  4.  Pt able to perform HEP correctly with minimal cueing or supervision for therapist. (progressing, not met)     Long term goals: 13 weeks or 20 visits   1. Pt will demonstrate increased lumbar ROM by at least 6 degrees from initial ROM value, resulting in improved ability to perform functional fwd bending while standing and sitting. (progressing, not met)  2. Pt will demonstrate increased maximum isometric torque value by 10% when compared to the initial value resulting in improved ability to perform bending, lifting, and carrying activities safely, confidently. (progressing, not met)  3. Pt to demonstrate ability to independently control and reduce their pain through posture positioning and mechanical movements throughout a typical day. (progressing, not met)  4.  Patient will demonstrate improved overall function per FOTO Survey to CJ = at least 20% but < 40% impaired, limited or restricted score or less. (progressing, not met)      Plan   Continue with established Plan of Care towards established PT goals.

## 2017-08-16 ENCOUNTER — CLINICAL SUPPORT (OUTPATIENT)
Dept: REHABILITATION | Facility: OTHER | Age: 82
End: 2017-08-16
Attending: PHYSICAL MEDICINE & REHABILITATION
Payer: MEDICARE

## 2017-08-16 DIAGNOSIS — M51.36 DDD (DEGENERATIVE DISC DISEASE), LUMBAR: Primary | ICD-10-CM

## 2017-08-16 PROCEDURE — 97110 THERAPEUTIC EXERCISES: CPT

## 2017-08-16 NOTE — PROGRESS NOTES
[facility]      Name: Jayne Aponte  Clinic Number: [id number]  Date of Treatment: 2017   Diagnosis:   Encounter Diagnosis   Name Primary?    DDD (degenerative disc disease), lumbar Yes     Physician: Emeli Lopez, *    Pain pattern determined: 1 PEN  Plan of care signed: 2017  Time in: 1:00  Time Out: 2:00  Total Treatment time: 45  Precautions: Hx of cancer  Visit #: 6    Face to Face discussion of patient was done between PT and PTA.     Subjective   Jayne reports minimal L side LBP today. She feels she will never get better. She states that she has pain in the mornings that is unchanged.  She is feeling a little off today, a little fatigued. She brought at [facility] for use to measure right today.     Patient reports their pain to be 2/10 on a 0-10 scale with 0 being no pain and 10 being the worst pain imaginable.  Pain Location: R sided low back     Occupation:  Worked at [employer]   Leisure: watch TV                     Pts goals:  Decrease back pain and improve function.     Objective     Baseline IM Testing Results:   Date of testin2017  ROM 0-36 deg   Max Peak Torque 45    Min Peak Torque 12    Flex/Ext Ratio 3.75:1   % below normative data -68.25     FOTO: Focus on Therapeutic Outcomes: EVAL  Category: lumbar   % Impaired: 52  Current Score  = CK = at least 40% but < 60% impaired, limited or restricted  Goal at Discharge Score = CJ = at least 20% but < 40% impaired, limited or restricted    Score interpretation is as follows:    TEST SCORE  Modifier  Impairment Limitation Restriction    0/50  CH  0 % impaired, limited or restricted   1-9/50  CI  @ least 1% but less than 20% impaired, limited or restricted   10-19/50  CJ  @ least 20%<40% impaired, limited or restricted   20-29/50  CK  @ least 40%<60% impaired, limited or restricted   30-39/50  CL  @ least 60% <80% impaired, limited or restricted   40-49/50  CM  @ least 80%<100% impaired limited or  restricted   50/50  CN  100% impaired, limited or restricted     REPEATED TEST MOVEMENTS: EVAL  Repeated Flexion in Standing end range pain   Repeated Extension in Standing end range pain   Repeated Flexion in lying worse independently, better with ball for support   Repeated Extension in lying  NT       Treatment    Pt was instructed in and performed the following:     Jayne received therapeutic exercises to develop/improved posture, cardiovascular endurance, muscular endurance, lumbar/cervical ROM, strength and muscular endurance for 40 minutes including the following exercises: lumbar med-x machine, torso rotation ROM only, knee extension, knee curls, and chest press (with 1# weights in supine).       [facility] 8/16/2017   Visit Number 6   VAS Pain Rating 2   Recumbent Bike Seat Pos. 10   Time 10   Flexion in Lying 10   Lumbar Weight 23   Repetitions 18   Rating of Perceived Exertion 4   Ice - Z Lie (in min.) 10         PPT in hookying 10  DKTC c ball 10  LTR c ball 10  SLR c TA contraction    Home Exercise Program as follows:   Handouts were given to the patient. Pt demo good understanding of the education provided. Jayne demonstrated good return demonstration of activities.                           Flexion in lying 10x, 3x/day                        PPT 10x, 3x/day                        LTR 10x 3x/day    Lumbar roll use compliance: no  Additional exercises taught this treatment session:   SLR c TA contraction    Assessment     Patient tolerated tx well with min decrease in pain.   Med X machine weight increase a little less than 5% due to tried 5% in the past session due to pt frail.   Reviewed HEP with patient and she tolerated it well.   Patient was able to perform HEP with moderate vc's for technique and recall.  Patient performed all peripheral resistance machines with no increases in symptoms.  Patient finished with ice in z-lie position.  Patient reported no change in symptoms upon leaving.  Adjusted pt cane appropriately and amb in clinic with well with SC for guidance and to increase steadiness. No LOB. She has a RW at home if she needs it for more balance.    Patient is making good progress towards established goals.  Pt will continue to benefit from skilled outpatient physical therapy to address the deficits stated in the impairment chart, provide pt/family education and to maximize pt's level of independence in the home and community environment.       Pt's spiritual, cultural and educational needs considered and pt agreeable to plan of care and goals as stated below:     Medical necessity is demonstrated by the following IMPAIRMENTS/PROBLEMS:    Pt presents with the following impairments:   History  Co-morbidities and personal factors that may impact the plan of care Examination  Body Structures and Functions, activity limitations and participation restrictions that may impact the plan of care Clinical Presentation Decision Making/ Complexity Score   Co-morbidities:   advanced age           Personal Factors:   age Body Regions:   back     Body Systems:   gross symmetry  ROM  strength  gait  transfers  motor control     Activity limitations:   Learning and applying knowledge  no deficits     General Tasks and Commands  no deficits     Communication  no deficits     Mobility  lifting and carrying objects  walking     Self care  no deficits     Domestic Life  shopping  cooking  doing house work (cleaning house, washing dishes, laundry)     Interactions/Relationships  no deficits     Life Areas  employment     Community and Social Life  no deficits     Participation Restrictions:   work    stable and uncomplicated    low                Goals:   Short term goals:  6 weeks or 10 visits   1.  Pt will demonstrate increased lumbar ROM by at least 3 degrees from the initial ROM value with improvements noted in functional ROM and ability to perform ADLs (progressing, not met)  2.  Pt will demonstrate  increased maximum isometric torque value by 5% when compared to the initial value resulting in improved ability to perform bending, lifting, and carrying activities safely, confidently. (progressing, not met)  3.  Patient report a reduction in worst pain score by 1-2 points for improved tolerance during work and recreational activities (progressing, not met)  4.  Pt able to perform HEP correctly with minimal cueing or supervision for therapist. (progressing, not met)     Long term goals: 13 weeks or 20 visits   1. Pt will demonstrate increased lumbar ROM by at least 6 degrees from initial ROM value, resulting in improved ability to perform functional fwd bending while standing and sitting. (progressing, not met)  2. Pt will demonstrate increased maximum isometric torque value by 10% when compared to the initial value resulting in improved ability to perform bending, lifting, and carrying activities safely, confidently. (progressing, not met)  3. Pt to demonstrate ability to independently control and reduce their pain through posture positioning and mechanical movements throughout a typical day. (progressing, not met)  4.  Patient will demonstrate improved overall function per FOTO Survey to CJ = at least 20% but < 40% impaired, limited or restricted score or less. (progressing, not met)      Plan   Continue with established Plan of Care towards established PT goals.

## 2017-11-28 ENCOUNTER — OFFICE VISIT (OUTPATIENT)
Dept: SPINE | Facility: CLINIC | Age: 82
End: 2017-11-28
Attending: PHYSICAL MEDICINE & REHABILITATION
Payer: MEDICARE

## 2017-11-28 VITALS
SYSTOLIC BLOOD PRESSURE: 160 MMHG | HEIGHT: 65 IN | WEIGHT: 117 LBS | DIASTOLIC BLOOD PRESSURE: 70 MMHG | BODY MASS INDEX: 19.49 KG/M2

## 2017-11-28 DIAGNOSIS — M54.50 CHRONIC LEFT-SIDED LOW BACK PAIN WITHOUT SCIATICA: ICD-10-CM

## 2017-11-28 DIAGNOSIS — M47.816 SPONDYLOSIS OF LUMBAR REGION WITHOUT MYELOPATHY OR RADICULOPATHY: ICD-10-CM

## 2017-11-28 DIAGNOSIS — M51.36 DDD (DEGENERATIVE DISC DISEASE), LUMBAR: ICD-10-CM

## 2017-11-28 DIAGNOSIS — M62.838 MUSCLE SPASM: Primary | ICD-10-CM

## 2017-11-28 DIAGNOSIS — G89.29 CHRONIC LEFT-SIDED LOW BACK PAIN WITHOUT SCIATICA: ICD-10-CM

## 2017-11-28 PROCEDURE — 99999 PR PBB SHADOW E&M-EST. PATIENT-LVL III: CPT | Mod: PBBFAC,,, | Performed by: PHYSICAL MEDICINE & REHABILITATION

## 2017-11-28 PROCEDURE — 99214 OFFICE O/P EST MOD 30 MIN: CPT | Mod: S$GLB,,, | Performed by: PHYSICAL MEDICINE & REHABILITATION

## 2017-11-28 RX ORDER — SULFAMETHOXAZOLE AND TRIMETHOPRIM 800; 160 MG/1; MG/1
TABLET ORAL
COMMUNITY
Start: 2017-10-04 | End: 2017-11-28

## 2017-11-28 NOTE — PROGRESS NOTES
Subjective:      Patient ID: Jayne Aponte is a 89 y.o. female.    Chief Complaint: Low-back Pain    Ms Aponte is a 89 yo female here for follow up of her low back pain that has been present for the past 20 years, but worsened in early june.  She was last seen by me on 7/27/2017 and we reviewed her imaging.  I encouraged her to continue PT.  She went to 6 visits.  We did left quadratus lumborum trigger points x 3 on 7/27/2017.  She did not tolerate mobic and did not try tizanidine.  She feels like the pain has been worse recently.  She had pain everywhere yesterday, but today is better.  She feels like the injections only helped for a couple of days.  She feels like her fingers lock up.  The pain is all over the lower back.  She feels like it takes her time to get going in the morning.  She has trouble with eyes and cannot read.  She feels like her pain all over has increased.  She feels like she cannot do as much, because hesitant where she is going to put her foot.  The pain is 5/10 now sitting, 8/10 worse.  She feels like the pain is still worse on the left side, but it is all over.      MRI lumbar 2016 report  Multilevel DDD and spondylosis f30-G2-1 without spinal stenosis or NF narrowing      x-ray of the lumbar spine  Lumbar spine with obliques as well as lateral flexion and extension.  Significant levoscoliosis with rotatory component noted.  No definite spondylolysis.  No significant subluxation on the flexion or extension views.  Calcified plaque in the aorta.  Disc space narrowing at multiple levels.    Impression degenerative disease with levoscoliosis.    MRI cervical 6/2017  Findings: There is no evidence of fracture, dislocation or marrow replacement process.  The vertebral body heights and alignment are maintained.  The visualized posterior fossa structures demonstrate no significant abnormality.  The surrounding soft tissue structures demonstrate no significant abnormality.  The cervical cord signal  is normal.     There is mild arthropathy at the atlanto-axial axial joint.     C2-3: Mild uncovertebral hypertrophy and minimal epidural lipomatosis. This abuts the cord posteriorly, but there is no significant canal stenosis.     C3-4: Uncovertebral hypertrophy. Mild epidural lipomatosis. No compressive sequelae.     C4-5: Uncovertebral hypertrophy. Grade 1 (2 mm) anterolisthesis C4 on C5. Mild ligamentum flavum thickening. No compressive sequelae.     C5-6: Intervertebral disc height loss, uncovertebral hypertrophy, and broad based disc bulge. There is also moderate ligamentum flavum thickening which results in mild-moderate canal stenosis and mild right and moderate left neuroforaminal narrowing.     C6-7: Uncovertebral hypertrophy. Grade 1 (1 mm) anterolisthesis C6 on C7. Moderate ligamentum flavum thickening. No significant canal stenosis or neuroforaminal narrowing.     C7-T1: No significant degenerative changes.  Impression         Degenerative changes of the cervical spine, most notable at C5-C6 as detailed above.    Past Medical History:  No date: Cancer    Past Surgical History:  No date: BREAST SURGERY    No family history on file.      Social History    Marital status:             Spouse name:                       Years of education:                 Number of children:               Social History Main Topics    Smoking status: Never Smoker                                                                Alcohol use: No                Current Outpatient Prescriptions:  metoprolol succinate (TOPROL-XL) 25 MG 24 hr tablet, Take 25 mg by mouth once daily., Disp: , Rfl:     No current facility-administered medications for this visit.       Review of patient's allergies indicates:  No Known Allergies          Review of Systems   Constitution: Negative for weight gain and weight loss.   Cardiovascular: Negative for chest pain.   Respiratory: Negative for shortness of breath.    Musculoskeletal: Positive  for back pain, myalgias and neck pain. Negative for joint pain and joint swelling.   Gastrointestinal: Negative for abdominal pain and bowel incontinence.   Genitourinary: Negative for bladder incontinence.   Neurological: Positive for paresthesias (weird feeling in arms). Negative for numbness.         Objective:        General: Jayne is well-developed, well-nourished, appears stated age, in no acute distress, alert and oriented to time, place and person.     General    Vitals reviewed.  Constitutional: She is oriented to person, place, and time. She appears well-developed and well-nourished.   HENT:   Head: Normocephalic and atraumatic.   Pulmonary/Chest: Effort normal.   Neurological: She is alert and oriented to person, place, and time.   Psychiatric: She has a normal mood and affect. Her behavior is normal. Judgment and thought content normal.     General Musculoskeletal Exam   Gait: normal     Right Ankle/Foot Exam     Tests   Heel Walk: able to perform  Tiptoe Walk: able to perform    Left Ankle/Foot Exam     Tests   Heel Walk: able to perform  Tiptoe Walk: able to perform  Back (L-Spine & T-Spine) / Neck (C-Spine) Exam     Tenderness Right paramedian tenderness of the Lower L-Spine. Left paramedian tenderness of the Lower L-Spine and Upper L-Spine.     Back (L-Spine & T-Spine) Range of Motion   Extension: 10   Flexion: 60   Lateral Bend Right: 10   Lateral Bend Left: 10   Rotation Right: 30   Rotation Left: 30     Spinal Sensation   Right Side Sensation  C-Spine Level: normal   L-Spine Level: normal  S-Spine Level: normal  Left Side Sensation  C-Spine Level: normal  L-Spine Level: normal  S-Spine Level: normal    Back (L-Spine & T-Spine) Tests   Right Side Tests  Straight leg raise:      Sitting SLR: > 70 degrees      Left Side Tests  Straight leg raise:     Sitting SLR: > 70 degrees          Other She has scoliosis .  Spinal Kyphosis:  Absent    Comments:  Left quadratus lumborum trigger points      Right  Hand/Wrist Exam     Tests     Atrophy   Thenar:  positive  Intrinsic:  positive  1st Dorsal Interosseous: positive      Left Hand/Wrist Exam     Tests     Atrophy  Thenar:  positive  Intrinsic: positive  1st Dorsal Interosseous:  positive          Muscle Strength   Right Upper Extremity   Biceps: 5/5/5   Deltoid:  5/5  Triceps:  5/5  Wrist Extension: 5/5/5   Finger Flexors:  5/5  Left Upper Extremity  Biceps: 5/5/5   Deltoid:  5/5  Triceps:  5/5  Wrist Extension: 5/5/5   Finger Flexors:  5/5  Right Lower Extremity   Hip Flexion: 5/5   Quadriceps:  5/5   Anterior tibial:  5/5/5  EHL:  5/5  Left Lower Extremity   Hip Flexion: 5/5   Quadriceps:  5/5   Anterior tibial:  5/5/5   EHL:  5/5    Reflexes     Left Side  Biceps:  2+  Triceps:  2+  Brachioradialis:  2+  Quadriceps:  2+  Achilles:  2+  Left Farooq's Sign:  Absent  Babinski Sign:  absent    Right Side   Biceps:  2+  Triceps:  2+  Brachioradialis:  2+  Quadriceps:  2+  Achilles:  2+  Right Farooq's Sign:  absent  Babinski Sign:  absent    Vascular Exam     Right Pulses        Carotid:                  2+    Left Pulses        Carotid:                  2+              Assessment:       1. Muscle spasm    2. DDD (degenerative disc disease), lumbar    3. Spondylosis of lumbar region without myelopathy or radiculopathy    4. Chronic left-sided low back pain without sciatica           Plan:       Orders Placed This Encounter    MRI Lumbar Spine Without Contrast     1. We discussed doing another MRI of the lumbar spine and considering injections.  We ill order  2.  We discussed continuing activities.  She cannot read due to vision and she is fearful of falling.  We discussed listening to pod casts  3.  She did not find PT helpful.  She did not go often, but does not want to go back  4.  Did not tolerate mobic 15mg po Qday  5. Did not try Tizanidine 2mg po TID, she wants someone home.  She will try when her daughter is home which is every weekend.  We disucssed trying  a half  6.  Tylenol 2 pills BID  7.  Trigger point injection quadratus lumborum x 3 was only helpful for a couple of days  8.  She has been using a cane so she feels more stable and more able to get out  9.  Her blood pressure is high.  She is going to call her PCP Dr. Corea  10.  rtc after MRI      Follow-up: Return in about 8 weeks (around 1/23/2018). If there are any questions prior to this, the patient was instructed to contact the office.

## 2017-12-04 ENCOUNTER — HOSPITAL ENCOUNTER (OUTPATIENT)
Dept: RADIOLOGY | Facility: OTHER | Age: 82
Discharge: HOME | End: 2017-12-04
Attending: PHYSICAL MEDICINE & REHABILITATION
Payer: MEDICARE

## 2017-12-04 ENCOUNTER — TELEPHONE (OUTPATIENT)
Dept: SPINE | Facility: CLINIC | Age: 82
End: 2017-12-04

## 2017-12-04 DIAGNOSIS — M54.50 CHRONIC LEFT-SIDED LOW BACK PAIN WITHOUT SCIATICA: ICD-10-CM

## 2017-12-04 DIAGNOSIS — M51.36 DDD (DEGENERATIVE DISC DISEASE), LUMBAR: ICD-10-CM

## 2017-12-04 DIAGNOSIS — M47.816 SPONDYLOSIS OF LUMBAR REGION WITHOUT MYELOPATHY OR RADICULOPATHY: ICD-10-CM

## 2017-12-04 DIAGNOSIS — G89.29 CHRONIC LEFT-SIDED LOW BACK PAIN WITHOUT SCIATICA: ICD-10-CM

## 2017-12-04 DIAGNOSIS — M62.838 MUSCLE SPASM: Primary | ICD-10-CM

## 2017-12-04 PROCEDURE — 72148 MRI LUMBAR SPINE W/O DYE: CPT | Mod: TC

## 2017-12-04 PROCEDURE — 72148 MRI LUMBAR SPINE W/O DYE: CPT | Mod: 26,,, | Performed by: INTERNAL MEDICINE

## 2017-12-04 NOTE — TELEPHONE ENCOUNTER
MRI was reviewed.  She does have significant scoliosis and mild Degenerative changes throughout the spine.  She wants to cancel appointment next week.  She will think about trying injections. She is going to try tizanidine.  We will schedule appointment in January for follow up

## 2017-12-13 ENCOUNTER — DOCUMENTATION ONLY (OUTPATIENT)
Dept: REHABILITATION | Facility: OTHER | Age: 82
End: 2017-12-13

## 2017-12-13 PROBLEM — M51.36 DDD (DEGENERATIVE DISC DISEASE), LUMBAR: Status: RESOLVED | Noted: 2017-07-10 | Resolved: 2017-12-13

## 2017-12-13 PROBLEM — M51.369 DDD (DEGENERATIVE DISC DISEASE), LUMBAR: Status: RESOLVED | Noted: 2017-07-10 | Resolved: 2017-12-13

## 2017-12-13 NOTE — PROGRESS NOTES
Outpatient Physical Therapy Discharge Summary    Date: 12/13/2017      Date of PT eval: 7/10/17  Number of PT visits: 6  Date of last visit: 8/16/17    Assessment:  Unable to assess if goals met secondary to lack of attendance (8 cancellations). Per chart review pt did not express significant changes with treatment and noted sceptical beliefs about reducing symptoms. Pt seeking alternative treatment at this time.     Plan: Discharge from outpatient physical therapy due to poor compliance with attendance policy and seeking alternative treatment.

## 2018-03-19 NOTE — PROGRESS NOTES
Subjective:      Patient ID: Jayne Aponte is a 89 y.o. female.    Chief Complaint: Low-back Pain    Ms Aponte is a 89 yo female here for follow up of her low back pain that has been present for the past 20 years, but worsened in early june.  She was last seen by me on 11/28/2017 and she was having pain all over and we ordered an MRI of the lumbar spine.  I discussed injections over the phone.  She was going to think about them.    We did left quadratus lumborum trigger points x 3 on 7/27/2017.   She feels like the injections only helped for a couple of days.  She did not like PT.  She feels like the pain is the same.  The pain is on the left and tightness up and goes down to the buttock.  She has good days and bad days.  She feels like pain is all on the left.  The pain can be severe.  She will have bad days and will not do anything.  She feels like the pain is worse with walking and standing, best with sitting, but the pain does not go away.  She has not tried tizanidine.  She feels like she cannot take it because makes her goofy.  She feels like tylenol is the best.  She feels like the pain is 5/10, but can be 10/10      MRI lumbar 12/2017  Routine imaging through the lumbar spine was obtained without the use of intravenous contrast material.    Comparison radiographs of the lumbar spine from June 2017.  The    There is significant scoliosis of the thoracolumbar spine, convex to the left in the mid lumbar region.  There is no evidence of acute compression deformity.  The vertebral bodies are satisfactorily aligned with no anterolisthesis or retrolisthesis.  There is disc desiccation and disc space narrowing throughout the spine.  Distal spinal cord is normal in signal.  There are multiple bilateral high T2 low T1 signal foci in the renal sinus, configuration most compatible with peripelvic cysts although no prior imaging for comparison.    T11-12 and T12-L1 demonstrate mild disc bulge.    L1-2 demonstrates mild  disc bulge and facet degenerative change.  There is moderate left foraminal narrowing.    L2-3 demonstrates mild disc bulge and facet degenerative change.  No significant foraminal or spinal canal narrowing.    L3-4 demonstrates mild disc bulge and facet degenerative change.  There is mild right foraminal narrowing.    L4-5 demonstrates mild disc bulge and facet degenerative change.  There is mild bilateral foraminal narrowing but no spinal stenosis.    L5-S1 demonstrates mild disc bulge but no significant foraminal or spinal canal narrowing.  Nerve sleeve cyst noted within the upper sacrum.  Impression      In this patient with significant lumbar scoliosis, there is no spinal canal stenosis with multilevel predominantly mild foraminal narrowing.      MRI lumbar 2016 report  Multilevel DDD and spondylosis [id number] without spinal stenosis or NF narrowing      x-ray of the lumbar spine  Lumbar spine with obliques as well as lateral flexion and extension.  Significant levoscoliosis with rotatory component noted.  No definite spondylolysis.  No significant subluxation on the flexion or extension views.  Calcified plaque in the aorta.  Disc space narrowing at multiple levels.    Impression degenerative disease with levoscoliosis.    MRI cervical 6/2017  Findings: There is no evidence of fracture, dislocation or marrow replacement process.  The vertebral body heights and alignment are maintained.  The visualized posterior fossa structures demonstrate no significant abnormality.  The surrounding soft tissue structures demonstrate no significant abnormality.  The cervical cord signal is normal.     There is mild arthropathy at the atlanto-axial axial joint.     C2-3: Mild uncovertebral hypertrophy and minimal epidural lipomatosis. This abuts the cord posteriorly, but there is no significant canal stenosis.     C3-4: Uncovertebral hypertrophy. Mild epidural lipomatosis. No compressive sequelae.     C4-5: Uncovertebral  hypertrophy. Grade 1 (2 mm) anterolisthesis C4 on C5. Mild ligamentum flavum thickening. No compressive sequelae.     C5-6: Intervertebral disc height loss, uncovertebral hypertrophy, and broad based disc bulge. There is also moderate ligamentum flavum thickening which results in mild-moderate canal stenosis and mild right and moderate left neuroforaminal narrowing.     C6-7: Uncovertebral hypertrophy. Grade 1 (1 mm) anterolisthesis C6 on C7. Moderate ligamentum flavum thickening. No significant canal stenosis or neuroforaminal narrowing.     C7-T1: No significant degenerative changes.  Impression         Degenerative changes of the cervical spine, most notable at C5-C6 as detailed above.    Past Medical History:  No date: Cancer    Past Surgical History:  No date: BREAST SURGERY    No family history on file.      Social History    Marital status:             Spouse name:                       Years of education:                 Number of children:               Social History Main Topics    Smoking status: Never Smoker                                                                Alcohol use: No                Current Outpatient Prescriptions:  metoprolol succinate (TOPROL-XL) 25 MG 24 hr tablet, Take 25 mg by mouth once daily., Disp: , Rfl:     No current facility-administered medications for this visit.       Review of patient's allergies indicates:  No Known Allergies              Review of Systems   Constitution: Negative for weight gain and weight loss.   Cardiovascular: Negative for chest pain.   Respiratory: Negative for shortness of breath.    Musculoskeletal: Positive for back pain and myalgias. Negative for joint pain and joint swelling.   Gastrointestinal: Negative for abdominal pain and bowel incontinence.   Genitourinary: Negative for bladder incontinence.   Neurological: Positive for paresthesias (weird feeling in arms). Negative for numbness.         Objective:        General: Jayne mullen  well-developed, well-nourished, appears stated age, in no acute distress, alert and oriented to time, place and person.     General    Vitals reviewed.  Constitutional: She is oriented to person, place, and time. She appears well-developed and well-nourished.   HENT:   Head: Normocephalic and atraumatic.   Pulmonary/Chest: Effort normal.   Neurological: She is alert and oriented to person, place, and time.   Psychiatric: She has a normal mood and affect. Her behavior is normal. Judgment and thought content normal.     General Musculoskeletal Exam   Gait: normal     Right Ankle/Foot Exam     Tests   Heel Walk: able to perform  Tiptoe Walk: able to perform    Left Ankle/Foot Exam     Tests   Heel Walk: able to perform  Tiptoe Walk: able to perform  Back (L-Spine & T-Spine) / Neck (C-Spine) Exam     Tenderness Left paramedian tenderness of the Lower L-Spine and Upper L-Spine.     Back (L-Spine & T-Spine) Range of Motion   Extension: 10   Flexion: 60   Lateral Bend Right: 10   Lateral Bend Left: 10   Rotation Right: 30   Rotation Left: 30     Spinal Sensation   Right Side Sensation  C-Spine Level: normal   L-Spine Level: normal  S-Spine Level: normal  Left Side Sensation  C-Spine Level: normal  L-Spine Level: normal  S-Spine Level: normal    Back (L-Spine & T-Spine) Tests   Right Side Tests  Straight leg raise:      Sitting SLR: > 70 degrees      Left Side Tests  Straight leg raise:     Sitting SLR: > 70 degrees          Other She has scoliosis .  Spinal Kyphosis:  Absent    Comments:  Left quadratus lumborum trigger points      Muscle Strength   Right Upper Extremity   Biceps: 5/5/5   Deltoid:  5/5  Triceps:  5/5  Wrist Extension: 5/5/5   Finger Flexors:  5/5  Left Upper Extremity  Biceps: 5/5/5   Deltoid:  5/5  Triceps:  5/5  Wrist Extension: 5/5/5   Finger Flexors:  5/5  Right Lower Extremity   Hip Flexion: 5/5   Quadriceps:  5/5   Anterior tibial:  5/5/5  EHL:  5/5  Left Lower Extremity   Hip Flexion: 5/5    Quadriceps:  5/5   Anterior tibial:  5/5/5   EHL:  5/5    Reflexes     Left Side  Biceps:  2+  Triceps:  2+  Brachioradialis:  2+  Quadriceps:  2+  Achilles:  2+  Left Farooq's Sign:  Absent  Babinski Sign:  absent    Right Side   Biceps:  2+  Triceps:  2+  Brachioradialis:  2+  Quadriceps:  2+  Achilles:  2+  Right Farooq's Sign:  absent  Babinski Sign:  absent    Vascular Exam     Right Pulses        Carotid:                  2+    Left Pulses        Carotid:                  2+              Assessment:       1. DDD (degenerative disc disease), lumbar    2. Spondylosis of lumbar region without myelopathy or radiculopathy    3. Chronic left-sided low back pain without sciatica    4. Muscle spasm           Plan:       Orders Placed This Encounter    Ambulatory Referral to Physical/Occupational Therapy     1. I reviewed her MRI with her, we discussed left facet injections.  She is not interested right now  2.  We discussed continuing activities.  She cannot read due to vision and she is fearful of falling.  We discussed listening to pod casts  3.  PT for back stretches and myofascial release of the QL with dry needling at [place].  We will fax order and she will get scheduled  4.  Did not tolerate mobic 15mg po Qday  5. Did not try Tizanidine 2mg but does not tolerate meds  6.  Tylenol 2 pills BID  7.  Trigger point injection quadratus lumborum x 3 was only helpful for a couple of days  8.  She has been using a cane so she feels more stable and more able to get out  9.  RTC 3-4 months, after PT, she wants to wait and schedule follow up after she does therapy.  She will call    Follow-up: Follow-up in about 3 months (around 6/20/2018). If there are any questions prior to this, the patient was instructed to contact the office.

## 2018-03-20 ENCOUNTER — OFFICE VISIT (OUTPATIENT)
Dept: SPINE | Facility: CLINIC | Age: 83
End: 2018-03-20
Attending: PHYSICAL MEDICINE & REHABILITATION
Payer: MEDICARE

## 2018-03-20 VITALS
SYSTOLIC BLOOD PRESSURE: 157 MMHG | BODY MASS INDEX: 19.49 KG/M2 | DIASTOLIC BLOOD PRESSURE: 72 MMHG | HEART RATE: 62 BPM | HEIGHT: 65 IN | WEIGHT: 117 LBS

## 2018-03-20 DIAGNOSIS — M47.816 SPONDYLOSIS OF LUMBAR REGION WITHOUT MYELOPATHY OR RADICULOPATHY: ICD-10-CM

## 2018-03-20 DIAGNOSIS — M51.36 DDD (DEGENERATIVE DISC DISEASE), LUMBAR: Primary | ICD-10-CM

## 2018-03-20 DIAGNOSIS — M62.838 MUSCLE SPASM: ICD-10-CM

## 2018-03-20 DIAGNOSIS — M54.50 CHRONIC LEFT-SIDED LOW BACK PAIN WITHOUT SCIATICA: ICD-10-CM

## 2018-03-20 DIAGNOSIS — G89.29 CHRONIC LEFT-SIDED LOW BACK PAIN WITHOUT SCIATICA: ICD-10-CM

## 2018-03-20 PROCEDURE — 99999 PR PBB SHADOW E&M-EST. PATIENT-LVL III: CPT | Mod: PBBFAC,,, | Performed by: PHYSICAL MEDICINE & REHABILITATION

## 2018-03-20 PROCEDURE — 99214 OFFICE O/P EST MOD 30 MIN: CPT | Mod: S$GLB,,, | Performed by: PHYSICAL MEDICINE & REHABILITATION

## 2019-07-17 ENCOUNTER — TELEPHONE (OUTPATIENT)
Dept: SPINE | Facility: CLINIC | Age: 84
End: 2019-07-17

## 2019-07-22 NOTE — PROGRESS NOTES
Subjective:      Patient ID: Jayne Aponte is a 89 y.o. female.    Chief Complaint: Low-back Pain and Hip Pain (left)    Ms Aponte is a 89 yo female here for follow up of her low back pain that has been present for the past 20 years, but worsened in early June 2017.  She was last seen by me on 3/20/2018 and she was going to go to outside PT.  She was not interested in facet injections.  She feels like the pain has been worse the past week.  The pain is in the back and to the left hip and to the back of the knee.  She feels like it is the scoliosis.  She did not go to PT.  The pain is with walking.  The pain is a sharp pain.  The pain keeps her from her activity.  She feels better with sitting.  The pain is on the outside of the hip.  Pain is 8/10 now, worst 10/10 walking, best 3/10 lying down.  She is taking tylenol twice a day.        MRI lumbar 12/2017  Routine imaging through the lumbar spine was obtained without the use of intravenous contrast material.    Comparison radiographs of the lumbar spine from June 2017.  The    There is significant scoliosis of the thoracolumbar spine, convex to the left in the mid lumbar region.  There is no evidence of acute compression deformity.  The vertebral bodies are satisfactorily aligned with no anterolisthesis or retrolisthesis.  There is disc desiccation and disc space narrowing throughout the spine.  Distal spinal cord is normal in signal.  There are multiple bilateral high T2 low T1 signal foci in the renal sinus, configuration most compatible with peripelvic cysts although no prior imaging for comparison.    T11-12 and T12-L1 demonstrate mild disc bulge.    L1-2 demonstrates mild disc bulge and facet degenerative change.  There is moderate left foraminal narrowing.    L2-3 demonstrates mild disc bulge and facet degenerative change.  No significant foraminal or spinal canal narrowing.    L3-4 demonstrates mild disc bulge and facet degenerative change.  There is mild  right foraminal narrowing.    L4-5 demonstrates mild disc bulge and facet degenerative change.  There is mild bilateral foraminal narrowing but no spinal stenosis.    L5-S1 demonstrates mild disc bulge but no significant foraminal or spinal canal narrowing.  Nerve sleeve cyst noted within the upper sacrum.  Impression      In this patient with significant lumbar scoliosis, there is no spinal canal stenosis with multilevel predominantly mild foraminal narrowing.      MRI lumbar 2016 report  Multilevel DDD and spondylosis [id number] without spinal stenosis or NF narrowing      x-ray of the lumbar spine  Lumbar spine with obliques as well as lateral flexion and extension.  Significant levoscoliosis with rotatory component noted.  No definite spondylolysis.  No significant subluxation on the flexion or extension views.  Calcified plaque in the aorta.  Disc space narrowing at multiple levels.    Impression degenerative disease with levoscoliosis.    MRI cervical 6/2017  Findings: There is no evidence of fracture, dislocation or marrow replacement process.  The vertebral body heights and alignment are maintained.  The visualized posterior fossa structures demonstrate no significant abnormality.  The surrounding soft tissue structures demonstrate no significant abnormality.  The cervical cord signal is normal.     There is mild arthropathy at the atlanto-axial axial joint.     C2-3: Mild uncovertebral hypertrophy and minimal epidural lipomatosis. This abuts the cord posteriorly, but there is no significant canal stenosis.     C3-4: Uncovertebral hypertrophy. Mild epidural lipomatosis. No compressive sequelae.     C4-5: Uncovertebral hypertrophy. Grade 1 (2 mm) anterolisthesis C4 on C5. Mild ligamentum flavum thickening. No compressive sequelae.     C5-6: Intervertebral disc height loss, uncovertebral hypertrophy, and broad based disc bulge. There is also moderate ligamentum flavum thickening which results in mild-moderate  canal stenosis and mild right and moderate left neuroforaminal narrowing.     C6-7: Uncovertebral hypertrophy. Grade 1 (1 mm) anterolisthesis C6 on C7. Moderate ligamentum flavum thickening. No significant canal stenosis or neuroforaminal narrowing.     C7-T1: No significant degenerative changes.  Impression         Degenerative changes of the cervical spine, most notable at C5-C6 as detailed above.    Past Medical History:  No date: Cancer    Past Surgical History:  No date: BREAST SURGERY    No family history on file.      Social History    Marital status:             Spouse name:                       Years of education:                 Number of children:               Social History Main Topics    Smoking status: Never Smoker                                                                Alcohol use: No                Current Outpatient Prescriptions:  metoprolol succinate (TOPROL-XL) 25 MG 24 hr tablet, Take 25 mg by mouth once daily., Disp: , Rfl:     No current facility-administered medications for this visit.       Review of patient's allergies indicates:  No Known Allergies        Review of Systems   Constitution: Negative for weight gain and weight loss.   Cardiovascular: Negative for chest pain.   Respiratory: Negative for shortness of breath.    Musculoskeletal: Positive for back pain (left hip pain) and myalgias. Negative for joint pain and joint swelling.   Gastrointestinal: Negative for abdominal pain and bowel incontinence.   Genitourinary: Negative for bladder incontinence.   Neurological: Positive for paresthesias (weird feeling in arms). Negative for numbness.         Objective:        General: Jayne is well-developed, well-nourished, appears stated age, in no acute distress, alert and oriented to time, place and person.     General    Vitals reviewed.  Constitutional: She is oriented to person, place, and time. She appears well-developed and well-nourished.   HENT:   Head: Normocephalic  and atraumatic.   Pulmonary/Chest: Effort normal.   Neurological: She is alert and oriented to person, place, and time.   Psychiatric: She has a normal mood and affect. Her behavior is normal. Judgment and thought content normal.     General Musculoskeletal Exam   Gait: antalgic     Right Ankle/Foot Exam     Tests   Heel Walk: able to perform  Tiptoe Walk: able to perform    Left Ankle/Foot Exam     Tests   Heel Walk: able to perform  Tiptoe Walk: able to perform  Left Hip Exam     Tenderness   The patient tender to palpation of the trochanteric bursa. Also left side trochanteric tenderness.    Range of Motion   External rotation: 50 (with pain)   Internal rotation: 15 (with pain)       Back (L-Spine & T-Spine) / Neck (C-Spine) Exam     Tenderness   The patient is tender to palpation of the left side trochanteric. Left paramedian tenderness of the Lower L-Spine.     Back (L-Spine & T-Spine) Range of Motion   Extension: 10   Flexion: 70   Lateral bend right: 10   Lateral bend left: 10   Rotation right: 30   Rotation left: 30     Spinal Sensation   Right Side Sensation  C-Spine Level: normal   L-Spine Level: normal  S-Spine Level: normal  Left Side Sensation  C-Spine Level: normal  L-Spine Level: normal  S-Spine Level: normal    Back (L-Spine & T-Spine) Tests   Right Side Tests  Straight leg raise:      Sitting SLR: > 70 degrees      Left Side Tests  Straight leg raise:     Sitting SLR: > 70 degrees          Other She has scoliosis .  Spinal Kyphosis:  Absent      Muscle Strength   Right Upper Extremity   Biceps: 5/5/5   Deltoid:  5/5  Triceps:  5/5  Wrist extension: 5/5/5   Finger Flexors:  5/5  Left Upper Extremity  Biceps: 5/5/5   Deltoid:  5/5  Triceps:  5/5  Wrist extension: 5/5/5   Finger Flexors:  5/5  Right Lower Extremity   Hip Flexion: 5/5   Quadriceps:  5/5   Anterior tibial:  5/5/5  EHL:  5/5  Left Lower Extremity   Hip Flexion: 5/5   Quadriceps:  5/5   Anterior tibial:  5/5/5   EHL:  5/5    Reflexes      Left Side  Biceps:  2+  Triceps:  2+  Brachioradialis:  2+  Quadriceps:  2+  Achilles:  2+  Left Farooq's Sign:  Absent  Babinski Sign:  absent    Right Side   Biceps:  2+  Triceps:  2+  Brachioradialis:  2+  Quadriceps:  2+  Achilles:  2+  Right Farooq's Sign:  absent  Babinski Sign:  absent    Vascular Exam     Right Pulses        Carotid:                  2+    Left Pulses        Carotid:                  2+              Assessment:       1. Trochanteric bursitis of left hip    2. Pain of left hip joint    3. Spondylosis of lumbar region without myelopathy or radiculopathy    4. DDD (degenerative disc disease), lumbar    5. Chronic left-sided low back pain without sciatica    6. Scoliosis, unspecified scoliosis type, unspecified spinal region           Plan:       Orders Placed This Encounter    Large Joint Aspiration/Injection: L greater trochanteric bursa    X-Ray Hips Bilateral 2 View Inc AP Pelvis     1.currently having outer hip pain on the left.  She has bursitis and her daughter is going to have heart surgery so she wants to make sure she can do what she needs to do  2.  Left trochanteric bursa injection.  Only 20mg of kenolog used.  A time out was performed, the correct patient, procedure, site, and position confirmed.   See procedure note  3.  PT she did not feel like PT helped at [facility] and did not go to [facility]  4.  Did not tolerate mobic 15mg po Qday  5. Did not try Tizanidine 2mg but does not tolerate meds  6.  Tylenol 2 pills BID  7.  Trigger point injection quadratus lumborum x 3 was only helpful for a couple of days  8.  She has been using a cane so she feels more stable and more able to get out  9.  X-ray of the hips, pain with left hip ROM  10.  RTC 3 months    Follow-up: Follow up in about 3 months (around 10/24/2019). If there are any questions prior to this, the patient was instructed to contact the office.

## 2019-07-24 ENCOUNTER — OFFICE VISIT (OUTPATIENT)
Dept: SPINE | Facility: CLINIC | Age: 84
End: 2019-07-24
Attending: PHYSICAL MEDICINE & REHABILITATION
Payer: MEDICARE

## 2019-07-24 ENCOUNTER — HOSPITAL ENCOUNTER (OUTPATIENT)
Dept: RADIOLOGY | Facility: OTHER | Age: 84
Discharge: HOME | End: 2019-07-24
Attending: PHYSICAL MEDICINE & REHABILITATION
Payer: MEDICARE

## 2019-07-24 ENCOUNTER — TELEPHONE (OUTPATIENT)
Dept: SPINE | Facility: CLINIC | Age: 84
End: 2019-07-24

## 2019-07-24 VITALS
DIASTOLIC BLOOD PRESSURE: 62 MMHG | SYSTOLIC BLOOD PRESSURE: 135 MMHG | HEIGHT: 65 IN | HEART RATE: 72 BPM | WEIGHT: 117 LBS | BODY MASS INDEX: 19.49 KG/M2 | TEMPERATURE: 98 F

## 2019-07-24 DIAGNOSIS — M25.552 PAIN OF LEFT HIP JOINT: ICD-10-CM

## 2019-07-24 DIAGNOSIS — M41.9 SCOLIOSIS, UNSPECIFIED SCOLIOSIS TYPE, UNSPECIFIED SPINAL REGION: ICD-10-CM

## 2019-07-24 DIAGNOSIS — M54.50 CHRONIC LEFT-SIDED LOW BACK PAIN WITHOUT SCIATICA: ICD-10-CM

## 2019-07-24 DIAGNOSIS — G89.29 CHRONIC LEFT-SIDED LOW BACK PAIN WITHOUT SCIATICA: ICD-10-CM

## 2019-07-24 DIAGNOSIS — M47.816 SPONDYLOSIS OF LUMBAR REGION WITHOUT MYELOPATHY OR RADICULOPATHY: ICD-10-CM

## 2019-07-24 DIAGNOSIS — M51.36 DDD (DEGENERATIVE DISC DISEASE), LUMBAR: ICD-10-CM

## 2019-07-24 DIAGNOSIS — M70.62 TROCHANTERIC BURSITIS OF LEFT HIP: ICD-10-CM

## 2019-07-24 DIAGNOSIS — M70.62 TROCHANTERIC BURSITIS OF LEFT HIP: Primary | ICD-10-CM

## 2019-07-24 PROCEDURE — 1101F PT FALLS ASSESS-DOCD LE1/YR: CPT | Mod: CPTII,S$GLB,, | Performed by: PHYSICAL MEDICINE & REHABILITATION

## 2019-07-24 PROCEDURE — 20610 LARGE JOINT ASPIRATION/INJECTION: L GREATER TROCHANTERIC BURSA: ICD-10-PCS | Mod: LT,S$GLB,, | Performed by: PHYSICAL MEDICINE & REHABILITATION

## 2019-07-24 PROCEDURE — 73521 X-RAY EXAM HIPS BI 2 VIEWS: CPT | Mod: TC,FY

## 2019-07-24 PROCEDURE — 73521 XR HIPS BILATERAL 2 VIEW INCL AP PELVIS: ICD-10-PCS | Mod: 26,,, | Performed by: INTERNAL MEDICINE

## 2019-07-24 PROCEDURE — 20610 DRAIN/INJ JOINT/BURSA W/O US: CPT | Mod: LT,S$GLB,, | Performed by: PHYSICAL MEDICINE & REHABILITATION

## 2019-07-24 PROCEDURE — 73521 X-RAY EXAM HIPS BI 2 VIEWS: CPT | Mod: 26,,, | Performed by: INTERNAL MEDICINE

## 2019-07-24 PROCEDURE — 99214 PR OFFICE/OUTPT VISIT, EST, LEVL IV, 30-39 MIN: ICD-10-PCS | Mod: 25,S$GLB,, | Performed by: PHYSICAL MEDICINE & REHABILITATION

## 2019-07-24 PROCEDURE — 99999 PR PBB SHADOW E&M-EST. PATIENT-LVL III: CPT | Mod: PBBFAC,,, | Performed by: PHYSICAL MEDICINE & REHABILITATION

## 2019-07-24 PROCEDURE — 99999 PR PBB SHADOW E&M-EST. PATIENT-LVL III: ICD-10-PCS | Mod: PBBFAC,,, | Performed by: PHYSICAL MEDICINE & REHABILITATION

## 2019-07-24 PROCEDURE — 1101F PR PT FALLS ASSESS DOC 0-1 FALLS W/OUT INJ PAST YR: ICD-10-PCS | Mod: CPTII,S$GLB,, | Performed by: PHYSICAL MEDICINE & REHABILITATION

## 2019-07-24 PROCEDURE — 99214 OFFICE O/P EST MOD 30 MIN: CPT | Mod: 25,S$GLB,, | Performed by: PHYSICAL MEDICINE & REHABILITATION

## 2019-07-24 NOTE — PROCEDURES
Large Joint Aspiration/Injection: L greater trochanteric bursa  Date/Time: 7/24/2019 8:42 AM  Performed by: Emeli Lopez MD  Authorized by: Emeli Lopez MD     Consent Done?:  Yes (Verbal)  Indications:  Pain  Procedure site marked: Yes    Timeout: Prior to procedure the correct patient, procedure, and site was verified      Location:  Hip  Site:  L greater trochanteric bursa  Prep: Patient was prepped and draped in usual sterile fashion    Needle size:  22 G  Medications:  10 mg triamcinolone acetonide 10 mg/mL; 10 mg triamcinolone acetonide 10 mg/mL  Aspirate:  Clear  Patient tolerance:  Patient tolerated the procedure well with no immediate complications

## 2019-07-24 NOTE — TELEPHONE ENCOUNTER
Called and reviewed X-ray results.  She does not have significant arthritis.  She is feeling better after trochanteric bursa injection

## 2020-03-03 ENCOUNTER — HOSPITAL ENCOUNTER (EMERGENCY)
Facility: OTHER | Age: 85
Discharge: HOME | End: 2020-03-03
Attending: EMERGENCY MEDICINE
Payer: MEDICARE

## 2020-03-03 VITALS
OXYGEN SATURATION: 96 % | TEMPERATURE: 98 F | DIASTOLIC BLOOD PRESSURE: 70 MMHG | HEIGHT: 65 IN | HEART RATE: 87 BPM | BODY MASS INDEX: 19.83 KG/M2 | WEIGHT: 119 LBS | SYSTOLIC BLOOD PRESSURE: 154 MMHG | RESPIRATION RATE: 16 BRPM

## 2020-03-03 DIAGNOSIS — R60.9 EDEMA: ICD-10-CM

## 2020-03-03 DIAGNOSIS — R52 PAIN: ICD-10-CM

## 2020-03-03 DIAGNOSIS — M79.671 PAIN IN BOTH FEET: ICD-10-CM

## 2020-03-03 DIAGNOSIS — M79.672 PAIN IN BOTH FEET: ICD-10-CM

## 2020-03-03 DIAGNOSIS — M72.2 PLANTAR FASCIITIS: Primary | ICD-10-CM

## 2020-03-03 PROCEDURE — 96372 THER/PROPH/DIAG INJ SC/IM: CPT

## 2020-03-03 PROCEDURE — 99284 EMERGENCY DEPT VISIT MOD MDM: CPT | Mod: 25

## 2020-03-03 PROCEDURE — 63600175 PHARM REV CODE 636 W HCPCS: Performed by: EMERGENCY MEDICINE

## 2020-03-03 RX ORDER — DEXAMETHASONE SODIUM PHOSPHATE 4 MG/ML
8 INJECTION, SOLUTION INTRA-ARTICULAR; INTRALESIONAL; INTRAMUSCULAR; INTRAVENOUS; SOFT TISSUE
Status: COMPLETED | OUTPATIENT
Start: 2020-03-03 | End: 2020-03-03

## 2020-03-03 RX ADMIN — DEXAMETHASONE SODIUM PHOSPHATE 8 MG: 4 INJECTION, SOLUTION INTRA-ARTICULAR; INTRALESIONAL; INTRAMUSCULAR; INTRAVENOUS; SOFT TISSUE at 03:03

## 2020-03-03 NOTE — ED PROVIDER NOTES
Encounter Date: 3/3/2020    SCRIBE #1 NOTE: I, Maggy Box, am scribing for, and in the presence of, Dr. Dos Santos.       History     Chief Complaint   Patient presents with    Leg Pain     EMS reports pt w/ bilateral non-traumatic leg pain, sent by PCP for further eval     Time seen by provider: 1:35 PM    This is a 89 y.o. female with a history of plantar fasciitis who presents with complaint of acute on chronic bilateral foot pain since yesterday. She states that pain starts in the arch of her foot and radiates up to the back of her knee. She states that pain is worse in the right foot. She reports increased pain with weight bearing. She denies any leg weakness. She denies any recent fall or trauma. She goes to [facility]. She has no other complaints at the time.    The history is provided by the patient.     Review of patient's allergies indicates:  No Known Allergies  Past Medical History:   Diagnosis Date    Cancer     Hypertension      Past Surgical History:   Procedure Laterality Date    BREAST SURGERY       No family history on file.  Social History     Tobacco Use    Smoking status: Never Smoker    Smokeless tobacco: Never Used   Substance Use Topics    Alcohol use: No    Drug use: Not on file     Review of Systems   Constitutional: Negative for chills and fever.   HENT: Negative for congestion and sore throat.    Eyes: Negative for photophobia and redness.   Respiratory: Negative for cough and shortness of breath.    Cardiovascular: Negative for chest pain.   Gastrointestinal: Negative for abdominal pain, nausea and vomiting.   Musculoskeletal: Negative for back pain.        Positive for right foot pain (R>L).   Skin: Negative for rash.   Neurological: Negative for weakness (lower extremity), light-headedness and headaches.   Psychiatric/Behavioral: Negative for confusion.       Physical Exam     Initial Vitals [03/03/20 1133]   BP Pulse Resp Temp SpO2   (!) 122/51 84 18 97.7 °F (36.5 °C)  100 %      MAP       --         Physical Exam    Nursing note and vitals reviewed.  Constitutional: She appears well-developed and well-nourished. She is not diaphoretic. No distress.   HENT:   Head: Normocephalic and atraumatic.   Mouth/Throat: Oropharynx is clear and moist and mucous membranes are normal.   Mucous membranes are moist.   Eyes: Conjunctivae and EOM are normal. Pupils are equal, round, and reactive to light. No scleral icterus.   Conjunctivae are pink, clear, and intact.    Neck: Normal range of motion. Neck supple.   Cardiovascular: Normal rate, regular rhythm, S1 normal, S2 normal and normal heart sounds. Exam reveals no gallop and no friction rub.    No murmur heard.  Pulses:       Dorsalis pedis pulses are 2+ on the right side, and 2+ on the left side.        Posterior tibial pulses are 2+ on the right side, and 2+ on the left side.   Pulmonary/Chest: Breath sounds normal. No respiratory distress. She has no wheezes. She has no rhonchi. She has no rales.   Lungs clear to auscultation bilaterally.    Abdominal: Soft. Bowel sounds are normal. There is no tenderness. There is no rebound and no guarding.   No audible bruits.    Musculoskeletal: Normal range of motion.   RLE: Tenderness on the plantar surface of right foot.  No lower extremity edema or palpable cords.   Lymphadenopathy:     She has no cervical adenopathy.   Neurological: She is alert and oriented to person, place, and time.   Skin: Skin is warm and dry. Capillary refill takes less than 2 seconds. No rash noted. No pallor.   Feet are not cold or hot to touch. No skin tenting. No lesions.   Psychiatric: She has a normal mood and affect. Her behavior is normal. Judgment and thought content normal.         ED Course   Procedures  Labs Reviewed - No data to display       Imaging Results          US Lower Extremity Veins Bilateral (Final result)  Result time 03/03/20 15:41:24    Final result by Jasvir Vinson MD (03/03/20 15:41:24)                  Impression:      No evidence of deep venous thrombosis in either lower extremity.      Electronically signed by: Jasvir Vinson MD  Date:    03/03/2020  Time:    15:41             Narrative:    EXAMINATION:  US LOWER EXTREMITY VEINS BILATERAL    CLINICAL HISTORY:  Edema, unspecified    TECHNIQUE:  Duplex and color flow Doppler and dynamic compression was performed of the bilateral lower extremity veins was performed.    COMPARISON:  None    FINDINGS:  Right thigh veins: The common femoral, femoral, popliteal, upper greater saphenous, and upper deep femoral veins are patent and free of thrombus. The veins are normally compressible and have normal phasic flow and augmentation response.    Right calf veins: The visualized calf veins are patent.    Left thigh veins: The common femoral, femoral, popliteal, upper greater saphenous, and upper deep femoral veins are patent and free of thrombus. The veins are normally compressible and have normal phasic flow and augmentation response.    Left calf veins: The visualized calf veins are patent.    Miscellaneous: There is a right popliteal fossa/Baker's cyst measuring 7.3 x 2.1 x 3.7 cm in size.  There is also a left popliteal fossa/Baker's cyst measuring 4.4 x 1.8 x 3.5 cm.                                 Medical Decision Making:   History:   Old Medical Records: I decided to obtain old medical records.  Clinical Tests:   Radiological Study: Ordered and Reviewed            Scribe Attestation:   Scribe #1: I performed the above scribed service and the documentation accurately describes the services I performed. I attest to the accuracy of the note.    Attending Attestation:           Physician Attestation for Scribe:  Physician Attestation Statement for Scribe #1: I, Dr. Dos Santos, reviewed documentation, as scribed by Maggy Box in my presence, and it is both accurate and complete.         Attending ED Notes:   Emergent evaluation of [age]-year-old female with complaint  of nontraumatic bilateral feet and leg pain.  Patient is afebrile, nontoxic-appearing stable vital signs.  Patient is neurovascularly intact without focal neurologic deficits.  No clinical evidence of infection, cellulitis or abscess formation.  No deformity, crepitus or step-offs.  No septic joint.  Ultrasound is negative for DVT.  The patient is extensively counseled on her diagnosis and treatment including all diagnostic and physical exam findings.  The patient discharged good condition and directed follow-up with her PCP in the next 24-48 hours.                        Clinical Impression:     1. Plantar fasciitis    2. Edema    3. Pain    4. Pain in both feet                ED Disposition Condition    Discharge Stable        ED Prescriptions     None        Follow-up Information     Follow up With Specialties Details Why Contact Info    [facility]   [address] [id number] [phone]    KHANH Corea MD Infectious Diseases   [address] [address]  [phone]      [facility]   Outpatient Services,, Home Health [phone]                                     Phil Castro MD  03/05/20 7512

## 2020-03-03 NOTE — PROGRESS NOTES
Message from Alan  About case - Rn MARCELA called Er Marilia Nix # [phone] - Per [facility]Sw he be down shortly     Jyoti English RN  Case management 3/3/45573:40 PM  # [phone] (FAX) [phone]

## 2020-03-03 NOTE — PLAN OF CARE
D/c today   E/R bed 3  Home health per PHN provider  DME to the home (per Dr Corea, Ms Aponte needs a regular W/C).       03/03/20 5866   Discharge Assessment   Assessment Type Discharge Planning Assessment   Confirmed/corrected address and phone number on facesheet? Yes   Assessment information obtained from? Patient;Caregiver;Medical Record   Communicated expected length of stay with patient/caregiver yes   Prior to hospitilization cognitive status: Alert/Oriented   Prior to hospitalization functional status: Assistive Equipment   Current cognitive status: Alert/Oriented   Current Functional Status: Assistive Equipment   Lives With child(scott), adult   Able to Return to Prior Arrangements yes   Discharge Plan A Home Health   DME Needed Upon Discharge  none   Patient/Family in Agreement with Plan yes

## 2020-03-03 NOTE — ED NOTES
Pt awake and alert, denies any needs at this point, resp pattern even and non labored. Pt's call light at bedside, bed locked in lowest position. WCTM

## 2020-03-03 NOTE — ED NOTES
89y/o F to ED for bilateral leg pain worsening over the last 2 days. Pt report PCP put her on medication for gout but pain has not changed in 24 hrs. Pt was instructed to come to the ED by PCP.

## 2020-03-03 NOTE — PLAN OF CARE
[facility]  [address]  [facility] [address]  [phone]         HOME  HEALTH ORDERS    03/03/2020    Admit to Home Health    Diagnoses: leg pain     Patient is homebound due to:  debility    Allergies:Review of patient's allergies indicates:  No Known Allergies    Diet: cardiac    Acitivities: as tolerated    Nursing:   SN to complete comprehensive assessment including routine vital signs. Instruct on disease process and s/s of complications to report to MD. Review/verify medication list sent home with the patient at time of discharge  and instruct patient/caregiver as needed. Frequency may be adjusted depending on start of care date.    Notify MD if SBP > 160 or < 90; DBP > 90 or < 50; HR > 120 or < 50; Temp > 101; Other:     CONSULTS:    PT to evaluate and treat. Evaluate for home safety and equipment needs; Establish/upgrade home exercise program. Perform / instruct on therapeutic exercises, gait training, transfer training, and Range of Motion.    OT to evaluate and treat. Evaluate home environment for safety and equipment needs. Perform/Instruct on transfers, ADL training, ROM, and therapeutic exercises.     Aide to provide assistance with personal care, ADLs, and vital signs        Medications: Review discharge medications with patient and family and provide education.       Jayne Aponte   Home Medication Instructions CATRACHITO:[id number]    Printed on:03/03/20 8949   Medication Information                      metoprolol succinate (TOPROL-XL) 25 MG 24 hr tablet  Take 25 mg by mouth once daily.                     Adriano Siddiqi_____________________________    03/03/2020

## 2020-03-04 NOTE — PROVIDER PROGRESS NOTES - EMERGENCY DEPT.
Encounter Date: 3/3/2020    ED Physician Progress Notes        Physician Note:   89-year-old female with history of hypertension initially presented with bilateral nontraumatic leg pain. She had difficulty localizing the pain initially, but was worse over the soles of her feet.  Patient initially seen by Dr. Velazquez, who ordered ultrasound to rule out DVT given reported leg swelling. DVT study negative. Her PCP Dr. Corea also saw patient at bedside.  Social work also consulted since patient has difficulty ambulating at home due to pain. Home health ordered and a wheelchair will be delivered to her house tomorrow.  Patient and daughter at bedside comfortable discharge plan with Tylenol p.r.n. pain, PCP and Podiatry follow-up, and return precautions for any worsening symptoms or difficulty caring for self at home.

## 2020-05-15 ENCOUNTER — HOSPITAL ENCOUNTER (EMERGENCY)
Facility: OTHER | Age: 85
Discharge: HOME | End: 2020-05-15
Attending: EMERGENCY MEDICINE
Payer: MEDICARE

## 2020-05-15 VITALS
SYSTOLIC BLOOD PRESSURE: 116 MMHG | HEART RATE: 110 BPM | RESPIRATION RATE: 18 BRPM | WEIGHT: 120 LBS | BODY MASS INDEX: 19.99 KG/M2 | DIASTOLIC BLOOD PRESSURE: 56 MMHG | HEIGHT: 65 IN | TEMPERATURE: 98 F | OXYGEN SATURATION: 96 %

## 2020-05-15 DIAGNOSIS — L03.116 CELLULITIS OF LEFT ANKLE: Primary | ICD-10-CM

## 2020-05-15 DIAGNOSIS — M25.572 ANKLE PAIN, LEFT: ICD-10-CM

## 2020-05-15 LAB
ALBUMIN SERPL BCP-MCNC: 3.1 G/DL (ref 3.5–5.2)
ALP SERPL-CCNC: 56 U/L (ref 55–135)
ALT SERPL W/O P-5'-P-CCNC: 8 U/L (ref 10–44)
ANION GAP SERPL CALC-SCNC: 9 MMOL/L (ref 8–16)
AST SERPL-CCNC: 16 U/L (ref 10–40)
BASOPHILS # BLD AUTO: 0.04 K/UL (ref 0–0.2)
BASOPHILS NFR BLD: 0.4 % (ref 0–1.9)
BILIRUB SERPL-MCNC: 0.5 MG/DL (ref 0.1–1)
BUN SERPL-MCNC: 20 MG/DL (ref 10–30)
CALCIUM SERPL-MCNC: 8.9 MG/DL (ref 8.7–10.5)
CHLORIDE SERPL-SCNC: 105 MMOL/L (ref 95–110)
CO2 SERPL-SCNC: 26 MMOL/L (ref 23–29)
CREAT SERPL-MCNC: 1.1 MG/DL (ref 0.5–1.4)
DIFFERENTIAL METHOD: ABNORMAL
EOSINOPHIL # BLD AUTO: 0 K/UL (ref 0–0.5)
EOSINOPHIL NFR BLD: 0.4 % (ref 0–8)
ERYTHROCYTE [DISTWIDTH] IN BLOOD BY AUTOMATED COUNT: 13.9 % (ref 11.5–14.5)
EST. GFR  (AFRICAN AMERICAN): 48 ML/MIN/1.73 M^2
EST. GFR  (NON AFRICAN AMERICAN): 42 ML/MIN/1.73 M^2
GLUCOSE SERPL-MCNC: 150 MG/DL (ref 70–110)
HCT VFR BLD AUTO: 38.9 % (ref 37–48.5)
HGB BLD-MCNC: 12.2 G/DL (ref 12–16)
IMM GRANULOCYTES # BLD AUTO: 0.06 K/UL (ref 0–0.04)
IMM GRANULOCYTES NFR BLD AUTO: 0.6 % (ref 0–0.5)
LYMPHOCYTES # BLD AUTO: 1.9 K/UL (ref 1–4.8)
LYMPHOCYTES NFR BLD: 18 % (ref 18–48)
MCH RBC QN AUTO: 30 PG (ref 27–31)
MCHC RBC AUTO-ENTMCNC: 31.4 G/DL (ref 32–36)
MCV RBC AUTO: 96 FL (ref 82–98)
MONOCYTES # BLD AUTO: 1.2 K/UL (ref 0.3–1)
MONOCYTES NFR BLD: 11.4 % (ref 4–15)
NEUTROPHILS # BLD AUTO: 7.4 K/UL (ref 1.8–7.7)
NEUTROPHILS NFR BLD: 69.2 % (ref 38–73)
NRBC BLD-RTO: 0 /100 WBC
PLATELET # BLD AUTO: 245 K/UL (ref 150–350)
PMV BLD AUTO: 9.8 FL (ref 9.2–12.9)
POTASSIUM SERPL-SCNC: 4.2 MMOL/L (ref 3.5–5.1)
PROCALCITONIN SERPL IA-MCNC: 0.05 NG/ML
PROT SERPL-MCNC: 6.6 G/DL (ref 6–8.4)
RBC # BLD AUTO: 4.07 M/UL (ref 4–5.4)
SODIUM SERPL-SCNC: 140 MMOL/L (ref 136–145)
URATE SERPL-MCNC: 5.1 MG/DL (ref 2.4–5.7)
WBC # BLD AUTO: 10.64 K/UL (ref 3.9–12.7)

## 2020-05-15 PROCEDURE — 25000003 PHARM REV CODE 250: Performed by: EMERGENCY MEDICINE

## 2020-05-15 PROCEDURE — 99284 EMERGENCY DEPT VISIT MOD MDM: CPT | Mod: 25

## 2020-05-15 PROCEDURE — 84145 PROCALCITONIN (PCT): CPT

## 2020-05-15 PROCEDURE — 80053 COMPREHEN METABOLIC PANEL: CPT

## 2020-05-15 PROCEDURE — 85025 COMPLETE CBC W/AUTO DIFF WBC: CPT

## 2020-05-15 PROCEDURE — 84550 ASSAY OF BLOOD/URIC ACID: CPT

## 2020-05-15 RX ORDER — CEPHALEXIN 500 MG/1
500 CAPSULE ORAL EVERY 8 HOURS
Qty: 20 CAPSULE | Refills: 0 | Status: SHIPPED | OUTPATIENT
Start: 2020-05-15 | End: 2020-05-15 | Stop reason: SDUPTHER

## 2020-05-15 RX ORDER — CEPHALEXIN 500 MG/1
500 CAPSULE ORAL EVERY 8 HOURS
Qty: 20 CAPSULE | Refills: 0 | Status: SHIPPED | OUTPATIENT
Start: 2020-05-15 | End: 2020-05-22

## 2020-05-15 RX ORDER — CEPHALEXIN 500 MG/1
500 CAPSULE ORAL
Status: COMPLETED | OUTPATIENT
Start: 2020-05-15 | End: 2020-05-15

## 2020-05-15 RX ADMIN — CEPHALEXIN 500 MG: 500 CAPSULE ORAL at 12:05

## 2020-05-15 NOTE — DISCHARGE INSTRUCTIONS
Keep your left ankle elevated, rest, use ice packs, and mild compression for comfort.  Return to the ER immediately if you develop fever, worsening swelling or redness or other concerns.  You may take over-the-counter ibuprofen or Tylenol as needed for pain.

## 2020-05-15 NOTE — ED PROVIDER NOTES
Encounter Date: 5/15/2020    SCRIBE #1 NOTE: Frances SUAZO, julianne scribing for, and in the presence of, Dr. Cabrera.       History     Chief Complaint   Patient presents with    Ankle Pain     Pt reports left ankle pain and swelling since tuesday. Pt denies any trauma     Time seen by provider: 10:26 AM    This is a 89 y.o. female who presents with complaint of left ankle swelling and pain that began 2 days ago. Patient denies any trauma or injury to the ankle and states she woke up with the pain and swelling. The pain is rated a 6-7/10. She took tylenol with minimal relief, last took tylenol 1 hour ago. She denies history of gout. She denies fever, chills, cough, congestion, chest pain, SOB, neck pain, back pain or any other joint pain.    The history is provided by the patient.     Review of patient's allergies indicates:  No Known Allergies  Past Medical History:   Diagnosis Date    Cancer     Hypertension      Past Surgical History:   Procedure Laterality Date    BREAST SURGERY       No family history on file.  Social History     Tobacco Use    Smoking status: Never Smoker    Smokeless tobacco: Never Used   Substance Use Topics    Alcohol use: No    Drug use: Not on file     Review of Systems   Constitutional: Negative for chills and fever.   HENT: Negative for congestion, sore throat and trouble swallowing.    Eyes: Negative for photophobia and visual disturbance.   Respiratory: Negative for shortness of breath.    Cardiovascular: Negative for chest pain.   Gastrointestinal: Negative for abdominal pain, nausea and vomiting.   Genitourinary: Negative for dysuria and hematuria.   Musculoskeletal: Positive for arthralgias (left ankle) and joint swelling (left ankle). Negative for back pain and neck pain.   Skin: Negative for rash and wound.   Neurological: Negative for weakness, numbness and headaches.   Psychiatric/Behavioral: Negative.        Physical Exam     Initial Vitals [05/15/20 1015]   BP Pulse Resp  Temp SpO2   137/63 78 15 98.4 °F (36.9 °C) 96 %      MAP       --         Physical Exam    Nursing note and vitals reviewed.  Constitutional: She appears well-developed and well-nourished. No distress.   HENT:   Head: Normocephalic and atraumatic.   Eyes: Conjunctivae and EOM are normal. Pupils are equal, round, and reactive to light.   Neck: Normal range of motion. Neck supple.   Cardiovascular: Normal rate and normal heart sounds. An irregularly irregular rhythm present.  Exam reveals no gallop and no friction rub.    No murmur heard.  Pulmonary/Chest: Breath sounds normal. No respiratory distress. She has no wheezes. She has no rhonchi. She has no rales.   Abdominal: Soft. There is no tenderness. There is no rebound and no guarding.   Musculoskeletal: Normal range of motion. She exhibits no edema or tenderness.   Tenderness over left lateral ankle with moderate swelling over lateral malleolus with faint erythema and mild warmth. No calf tenderness.   Neurological: She is alert and oriented to person, place, and time. She has normal strength.   Skin: Skin is warm and dry. No rash noted. There is erythema.   Psychiatric: She has a normal mood and affect. Her behavior is normal. Judgment and thought content normal.         ED Course   Procedures  Labs Reviewed   CBC W/ AUTO DIFFERENTIAL - Abnormal; Notable for the following components:       Result Value    Mean Corpuscular Hemoglobin Conc 31.4 (*)     Immature Granulocytes 0.6 (*)     Immature Grans (Abs) 0.06 (*)     Mono # 1.2 (*)     All other components within normal limits   COMPREHENSIVE METABOLIC PANEL - Abnormal; Notable for the following components:    Glucose 150 (*)     Albumin 3.1 (*)     ALT 8 (*)     eGFR if  48 (*)     eGFR if non  42 (*)     All other components within normal limits   PROCALCITONIN   URIC ACID          Imaging Results          X-Ray Ankle Complete Left (Final result)  Result time 05/15/20 11:07:36     Final result by Solomon Carpenter MD (05/15/20 11:07:36)                 Impression:      No acute findings      Electronically signed by: Solomon Carpenter MD  Date:    05/15/2020  Time:    11:07             Narrative:    EXAMINATION:  XR ANKLE COMPLETE 3 VIEW LEFT    CLINICAL HISTORY:  Pain in left ankle and joints of left foot    TECHNIQUE:  AP, lateral and oblique views of the left ankle were performed.    COMPARISON:  None    FINDINGS:  No evidence of an acute fracture or dislocation.  The ankle mortise appears relatively well maintained.  No radiopaque foreign bodies.                                 Medical Decision Making:   History:   Old Medical Records: I decided to obtain old medical records.  Independently Interpreted Test(s):   I have ordered and independently interpreted X-rays - see prior notes.  Clinical Tests:   Lab Tests: Ordered and Reviewed  Radiological Study: Ordered and Reviewed  ED Management:  Emergent evaluation a 89-year-old female who presents with complaint of nontraumatic left ankle pain.  Vital signs are benign, afebrile.  On exam there is swelling and faint erythema with slight warmth over the lateral malleolus of the ankle.  This may represent cellulitis, but I do not think it is a limb threatening cellulitis at this time.  Labs are reassuring with no leukocytosis, negative procalcitonin.  X-ray shows no fracture or large effusion.  Given normal renal function and uric acid level, gout is less likely.  I suspect this is mild cellulitis.  Patient's PCP is Dr. Corea- I did discuss with him I plan to send her home with cephalexin and close return instructions for worsening or development of fever.  He agrees.  I discussed plan with patient's son who was also in agreement.  She is discharged in good condition encouraged to keep leg elevated with light compression and to return immediately for increasing area erythema, pain, or development of fever.            Scribe Attestation:   Scribe  #1: I performed the above scribed service and the documentation accurately describes the services I performed. I attest to the accuracy of the note.    Attending Attestation:           Physician Attestation for Scribe:  Physician Attestation Statement for Scribe #1: I, Dr. Cabrera, reviewed documentation, as scribed by Frances Vega in my presence, and it is both accurate and complete.                 ED Course as of May 17 1101   Fri May 15, 2020   1159 Paged Dr. Corea    [AK]      ED Course User Index  [AK] Park Cabrera MD                Clinical Impression:     1. Cellulitis of left ankle    2. Ankle pain, left              ED Disposition Condition    Discharge Stable        ED Prescriptions     Medication Sig Dispense Start Date End Date Auth. Provider    cephALEXin (KEFLEX) 500 MG capsule  (Status: Discontinued) Take 1 capsule (500 mg total) by mouth every 8 (eight) hours. for 7 days 20 capsule 5/15/2020 5/15/2020 Park Cabrera MD    cephALEXin (KEFLEX) 500 MG capsule Take 1 capsule (500 mg total) by mouth every 8 (eight) hours. for 7 days 20 capsule 5/15/2020 5/22/2020 Park Cabrera MD        Follow-up Information     Follow up With Specialties Details Why Contact Info    KHANH Corea MD Infectious Diseases Schedule an appointment as soon as possible for a visit in 2 days for close follow up and symptom re-check [address] [facility] [address] [facility] [address]  [phone]      [facility]  As needed, If symptoms worsen [address] [id number] [phone]                                     Park Cabrera MD  05/17/20 3738

## 2020-05-15 NOTE — ED TRIAGE NOTES
Pt reports left ankle pain and swelling since Wednesday morning. Pt denies any trauma. She states she wears appropriate shoes and is unsure of the reason for the swelling

## 2022-03-16 NOTE — TELEPHONE ENCOUNTER
----- Message from Kaiden Abreu sent at 7/17/2019  9:33 AM CDT -----  Contact: MADELEINE JONES [[id number]]  Name of Who is Calling: MADELEINE JONES [[id number]]      What is the request in detail: Would like to speak with staff in regards to an appointment before September. States this is urgent she has a bad case of scoliosis which is effecting her hip and her walking. Please advise      Can the clinic reply by [facility]: no      What Number to [facility] Back [facility] [phone]                                  
Patient was contacted and offered an appt for 07/24/19 at 8:30am she stated that she would contact the office to confirm  
(2) well flexed

## 2022-06-16 NOTE — ED NOTES
Pt resting quietly. Visible rise and fall of chest. No acute distress noted at this time. Bed in low and locked position. Side rails raised x 2. Call light within reach. Will continue to monitor.     Abdominal Pain, N/V/D (Pediatric)

## 2023-01-09 ENCOUNTER — HOSPITAL ENCOUNTER (OUTPATIENT)
Dept: RADIOLOGY | Facility: OTHER | Age: 88
Discharge: HOME | End: 2023-01-09
Attending: SPECIALIST
Payer: MEDICARE

## 2023-01-09 DIAGNOSIS — J98.4 RESTRICTIVE LUNG DISEASE: ICD-10-CM

## 2023-01-09 PROCEDURE — 71046 X-RAY EXAM CHEST 2 VIEWS: CPT | Mod: 26,,, | Performed by: RADIOLOGY

## 2023-01-09 PROCEDURE — 71046 X-RAY EXAM CHEST 2 VIEWS: CPT | Mod: TC,FY

## 2023-01-09 PROCEDURE — 71046 XR CHEST PA AND LATERAL: ICD-10-PCS | Mod: 26,,, | Performed by: RADIOLOGY

## 2024-04-03 ENCOUNTER — HOSPITAL ENCOUNTER (EMERGENCY)
Facility: HOSPITAL | Age: 89
Discharge: OTHER INSTITUTION | End: 2024-04-04
Attending: STUDENT IN AN ORGANIZED HEALTH CARE EDUCATION/TRAINING PROGRAM
Payer: MEDICARE

## 2024-04-03 DIAGNOSIS — S81.802A WOUND OF LEFT LEG, INITIAL ENCOUNTER: ICD-10-CM
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
## 2024-04-03 DIAGNOSIS — S81.812A LACERATION OF LEFT LOWER EXTREMITY, INITIAL ENCOUNTER: Primary | ICD-10-CM

## 2024-04-03 PROCEDURE — 63600175 PHARM REV CODE 636 W HCPCS

## 2024-04-03 PROCEDURE — 99284 EMERGENCY DEPT VISIT MOD MDM: CPT | Mod: 25

## 2024-04-03 PROCEDURE — 25000003 PHARM REV CODE 250

## 2024-04-03 PROCEDURE — 90471 IMMUNIZATION ADMIN: CPT

## 2024-04-03 PROCEDURE — 90715 TDAP VACCINE 7 YRS/> IM: CPT

## 2024-04-03 RX ORDER — LIDOCAINE HYDROCHLORIDE 10 MG/ML
10 INJECTION INFILTRATION; PERINEURAL
Status: DISCONTINUED | OUTPATIENT
Start: 2024-04-03 | End: 2024-04-03

## 2024-04-03 RX ORDER — LIDOCAINE HYDROCHLORIDE 20 MG/ML
10 INJECTION, SOLUTION EPIDURAL; INFILTRATION; INTRACAUDAL; PERINEURAL ONCE
Status: COMPLETED | OUTPATIENT
Start: 2024-04-03 | End: 2024-04-03

## 2024-04-03 RX ADMIN — TETANUS TOXOID, REDUCED DIPHTHERIA TOXOID AND ACELLULAR PERTUSSIS VACCINE, ADSORBED 0.5 ML: 5; 2.5; 8; 8; 2.5 SUSPENSION INTRAMUSCULAR at 10:04

## 2024-04-03 RX ADMIN — LIDOCAINE HYDROCHLORIDE 200 MG: 20 INJECTION, SOLUTION EPIDURAL; INFILTRATION; INTRACAUDAL; PERINEURAL at 11:04

## 2024-04-04 VITALS
DIASTOLIC BLOOD PRESSURE: 84 MMHG | OXYGEN SATURATION: 98 % | RESPIRATION RATE: 18 BRPM | SYSTOLIC BLOOD PRESSURE: 186 MMHG | HEART RATE: 93 BPM | TEMPERATURE: 99 F

## 2024-04-04 PROCEDURE — 12002 RPR S/N/AX/GEN/TRNK2.6-7.5CM: CPT

## 2024-04-04 PROCEDURE — 25000003 PHARM REV CODE 250

## 2024-04-04 RX ORDER — ACETAMINOPHEN 325 MG/1
650 TABLET ORAL ONCE
Status: COMPLETED | OUTPATIENT
Start: 2024-04-04 | End: 2024-04-04

## 2024-04-04 RX ADMIN — ACETAMINOPHEN 650 MG: 325 TABLET ORAL at 01:04

## 2024-04-04 NOTE — ED PROVIDER NOTES
Encounter Date: 4/3/2024       History     Chief Complaint   Patient presents with    Laceration     Left lower leg, bleeding controlled, - blood thinners     89-year-old female with PMH of cancer and HTN presentes to ED from nursing home on concerns of a laceration to her left leg that had sustained from her foot getting caught in the spokes of her wheelchair and she had fallen and struck her leg. Denies symptoms of fevers, chills, shortness of breath. She feels no pain nor tenderness to the site of the wound. Son at bedside providing partial history. She is AAOx4. She had not had a recent tetanus ppx.     The history is provided by the patient and a relative (son).   Laceration   The laceration is located on the Left leg. The pain is at a severity of 0/10. She reports no foreign bodies present.     Review of patient's allergies indicates:  No Known Allergies  Past Medical History:   Diagnosis Date    Cancer     Hypertension      Past Surgical History:   Procedure Laterality Date    BREAST SURGERY       History reviewed. No pertinent family history.  Social History     Tobacco Use    Smoking status: Never    Smokeless tobacco: Never   Substance Use Topics    Alcohol use: No     Review of Systems   Constitutional:  Negative for chills, fatigue and fever.   Respiratory:  Negative for cough and shortness of breath.    Cardiovascular:  Negative for leg swelling.   Gastrointestinal:  Negative for nausea and vomiting.   Musculoskeletal:  Positive for gait problem.   Skin:  Positive for wound.       Physical Exam     Initial Vitals [04/03/24 2039]   BP Pulse Resp Temp SpO2   (!) 153/69 78 16 98.7 °F (37.1 °C) 96 %      MAP       --         Physical Exam    Vitals reviewed.  Constitutional: She appears well-developed and well-nourished.   HENT:   Head: Normocephalic and atraumatic.   Eyes: EOM are normal.   Neck: Neck supple.   Cardiovascular:  Normal rate, regular rhythm and normal heart sounds.           No murmur  heard.  Pulmonary/Chest: Breath sounds normal. No respiratory distress.   Abdominal: Abdomen is soft. Bowel sounds are normal. She exhibits no distension.   Musculoskeletal:      Cervical back: Neck supple.     Neurological: She is alert and oriented to person, place, and time.   Skin:   Wound along left anterior leg approximately 5 cm in length   Psychiatric: She has a normal mood and affect. Thought content normal.         ED Course   Lac Repair    Date/Time: 4/4/2024 1:13 AM    Performed by: Loi Solis MD  Authorized by: Hunter Rajan MD    Consent:     Consent obtained:  Verbal    Consent given by:  Patient    Risks, benefits, and alternatives were discussed: yes      Risks discussed:  Infection, pain and need for additional repair    Alternatives discussed:  No treatment  Universal protocol:     Procedure explained and questions answered to patient or proxy's satisfaction: yes      Imaging studies available: yes      Patient identity confirmed:  Verbally with patient  Anesthesia:     Anesthesia method:  Local infiltration    Local anesthetic:  Lidocaine 2% w/o epi  Laceration details:     Location:  Leg    Leg location:  L upper leg    Length (cm):  5    Depth (mm):  50  Pre-procedure details:     Preparation:  Patient was prepped and draped in usual sterile fashion and imaging obtained to evaluate for foreign bodies  Exploration:     Limited defect created (wound extended): no      Hemostasis achieved with:  Direct pressure    Imaging obtained: x-ray      Imaging outcome: foreign body not noted      Wound exploration: entire depth of wound visualized      Wound extent: fascia violated      Wound extent: no foreign bodies/material noted, no muscle damage noted, no nerve damage noted, no tendon damage noted and no underlying fracture noted      Contaminated: no    Treatment:     Area cleansed with:  Povidone-iodine and saline    Amount of cleaning:  Extensive    Irrigation solution:  Sterile saline     Irrigation volume:  30cc    Irrigation method:  Syringe    Visualized foreign bodies/material removed: no (none noted)      Debridement:  None    Undermining:  None    Scar revision: no      Layers/structures repaired:  Deep subcutaneous  Deep subcutaneous:     Suture size:  3-0    Suture material:  Monocryl and Vicryl    Suture technique:  Simple interrupted    Number of sutures:  4  Skin repair:     Repair method:  Steri-Strips and sutures    Suture size:  3-0    Suture material:  Nylon    Suture technique:  Simple interrupted    Number of sutures:  16    Number of Steri-Strips:  6  Approximation:     Approximation:  Close  Repair type:     Repair type:  Simple  Post-procedure details:     Dressing:  Sterile dressing    Procedure completion:  Tolerated well, no immediate complications  Comments:      10cc of 2% lidocaine infiltrated with sterile betadine prep. After closure with sterile field at bedside, site was dressed with steristrips, betadine-soaked adaptic, betadine gauze wet-to-dry, kerlix, ace.     Labs Reviewed   HIV 1 / 2 ANTIBODY   HEPATITIS C ANTIBODY          Imaging Results              X-Ray Tibia Fibula 2 View Left (Final result)  Result time 04/03/24 22:44:55      Final result by Moncho Mckinney DO (04/03/24 22:44:55)                   Impression:      No acute osseous abnormality.    Soft tissue laceration of the medial distal lower leg.      Electronically signed by: Moncho Mckinney  Date:    04/03/2024  Time:    22:44               Narrative:    EXAMINATION:  XR TIBIA FIBULA 2 VIEW LEFT    CLINICAL HISTORY:  Unspecified open wound, left lower leg, initial encounter    TECHNIQUE:  AP and lateral views of the left tibia and fibula were performed.    COMPARISON:  None.    FINDINGS:  There is a soft tissue laceration of the medial distal lower leg.  There is soft tissue edema.  There are vascular calcifications.  There is no evidence of a radiopaque foreign body.  There is no acute fracture or  dislocation.  There is no radiographic evidence of acute osteomyelitis.  There is joint space narrowing of the medial tibiofemoral compartment with mild marginal osteophytes.  There is chondrocalcinosis of the medial and lateral compartments which can be seen with CPPD.                                       Medications   acetaminophen tablet 650 mg (has no administration in time range)   Tdap (BOOSTRIX) vaccine injection 0.5 mL (0.5 mLs Intramuscular Given 4/3/24 2232)   LIDOcaine (PF) 20 mg/mL (2%) injection 200 mg (200 mg Other Given by Other 4/3/24 2330)     Medical Decision Making  89-year-old female here with a laceration to her left leg.      TDaP given. XR Tib/fib with no fracture or osseous involvement. Acetaminophen 650 given for pain. Closure of laceration at bedside. See procedure note below. As wound is clean, no antibiotics given. Close follow-up recommended with wound care.     Amount and/or Complexity of Data Reviewed  Radiology: ordered and independent interpretation performed. Decision-making details documented in ED Course.     Details: No osseous involvement of laceration, no fractures identified.     Risk  OTC drugs.  Prescription drug management.                                        Clinical Impression:  Final diagnoses:  [S81.802A] Wound of left leg, initial encounter  [S81.812A] Laceration of left lower extremity, initial encounter (Primary)  [W01.119A] Fall against sharp object, initial encounter                 Loi Solis MD  Resident  04/04/24 0122       Hunter Rajan MD  04/04/24 [id number]

## 2024-04-04 NOTE — ED TRIAGE NOTES
Jayne Aponte, a 89 y.o. female presents to the ED w/ complaint of laceration. Pt states she was standing and she stepped but she doesn't know how her leg became stuck in her wheelchair. Pt has a laceration to her left lower extremity. Pt denies any falls/trauma, or LOC. Pt is negative for blood thinners. PT is AAOX4.    Triage note:  Chief Complaint   Patient presents with    Laceration     Left lower leg, bleeding controlled, - blood thinners     Review of patient's allergies indicates:  No Known Allergies  Past Medical History:   Diagnosis Date    Cancer     Hypertension

## 2024-04-04 NOTE — DISCHARGE INSTRUCTIONS
Cleanse wound with saline, betadine. Apply betadine-soaked adaptic or xeroform, betadine wet-to-dry, kerlix, and ace to moderate compression. Change x3/week. Patient is not to ambulate until sutures are removed (PCP/woundcare specialist follow up 2-4 weeks for suture removal). Recommending weekly PCP/woundcare follow up. Transportation to nursing facility.

## 2024-04-08 ENCOUNTER — HOSPITAL ENCOUNTER (INPATIENT)
Facility: HOSPITAL | Age: 89
LOS: 6 days | DRG: 871 | End: 2024-04-15
Attending: EMERGENCY MEDICINE | Admitting: STUDENT IN AN ORGANIZED HEALTH CARE EDUCATION/TRAINING PROGRAM
Payer: MEDICARE

## 2024-04-08 DIAGNOSIS — R07.9 CHEST PAIN: ICD-10-CM

## 2024-04-08 DIAGNOSIS — G93.40 ACUTE ENCEPHALOPATHY: Primary | ICD-10-CM

## 2024-04-08 DIAGNOSIS — S01.81XA LACERATION OF FOREHEAD, INITIAL ENCOUNTER: ICD-10-CM
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
## 2024-04-08 LAB — POCT GLUCOSE: 115 MG/DL (ref 70–110)

## 2024-04-08 PROCEDURE — 96375 TX/PRO/DX INJ NEW DRUG ADDON: CPT

## 2024-04-08 PROCEDURE — 82803 BLOOD GASES ANY COMBINATION: CPT

## 2024-04-08 PROCEDURE — 96365 THER/PROPH/DIAG IV INF INIT: CPT

## 2024-04-08 PROCEDURE — 96376 TX/PRO/DX INJ SAME DRUG ADON: CPT

## 2024-04-08 PROCEDURE — 96366 THER/PROPH/DIAG IV INF ADDON: CPT

## 2024-04-08 PROCEDURE — 12014 RPR F/E/E/N/L/M 5.1-7.5 CM: CPT

## 2024-04-08 PROCEDURE — 99900035 HC TECH TIME PER 15 MIN (STAT)

## 2024-04-08 PROCEDURE — 99285 EMERGENCY DEPT VISIT HI MDM: CPT | Mod: 25

## 2024-04-08 PROCEDURE — 96367 TX/PROPH/DG ADDL SEQ IV INF: CPT

## 2024-04-08 PROCEDURE — 82962 GLUCOSE BLOOD TEST: CPT

## 2024-04-08 RX ORDER — LIDOCAINE HYDROCHLORIDE AND EPINEPHRINE 10; 10 MG/ML; UG/ML
10 INJECTION, SOLUTION INFILTRATION; PERINEURAL
Status: COMPLETED | OUTPATIENT
Start: 2024-04-08 | End: 2024-04-09

## 2024-04-09 PROBLEM — J44.9 CHRONIC OBSTRUCTIVE LUNG DISEASE: Status: ACTIVE | Noted: 2023-01-12

## 2024-04-09 PROBLEM — I47.10 SUPRAVENTRICULAR TACHYCARDIA: Status: ACTIVE | Noted: 2023-01-12

## 2024-04-09 PROBLEM — N18.30 STAGE 3 CHRONIC KIDNEY DISEASE: Status: ACTIVE | Noted: 2023-01-12

## 2024-04-09 PROBLEM — F03.90 DEMENTIA: Status: ACTIVE | Noted: 2023-09-26

## 2024-04-09 PROBLEM — N39.0 UTI (URINARY TRACT INFECTION): Status: ACTIVE | Noted: 2024-04-09

## 2024-04-09 PROBLEM — I10 HTN (HYPERTENSION): Status: ACTIVE | Noted: 2024-04-09

## 2024-04-09 PROBLEM — E78.5 HYPERLIPIDEMIA: Status: ACTIVE | Noted: 2023-01-12

## 2024-04-09 PROBLEM — E87.1 HYPONATREMIA: Status: ACTIVE | Noted: 2024-04-09

## 2024-04-09 LAB
ALBUMIN SERPL BCP-MCNC: 3 G/DL (ref 3.5–5.2)
ALLENS TEST: ABNORMAL
ALP SERPL-CCNC: 92 U/L (ref 55–135)
ALT SERPL W/O P-5'-P-CCNC: 11 U/L (ref 10–44)
ANION GAP SERPL CALC-SCNC: 12 MMOL/L (ref 8–16)
AST SERPL-CCNC: 19 U/L (ref 10–40)
BACTERIA #/AREA URNS AUTO: ABNORMAL /HPF
BASOPHILS # BLD AUTO: 0.05 K/UL (ref 0–0.2)
BASOPHILS NFR BLD: 0.3 % (ref 0–1.9)
BILIRUB SERPL-MCNC: 0.4 MG/DL (ref 0.1–1)
BILIRUB UR QL STRIP: NEGATIVE
BUN SERPL-MCNC: 30 MG/DL (ref 10–30)
CALCIUM SERPL-MCNC: 9.4 MG/DL (ref 8.7–10.5)
CHLORIDE SERPL-SCNC: 101 MMOL/L (ref 95–110)
CLARITY UR REFRACT.AUTO: ABNORMAL
CO2 SERPL-SCNC: 21 MMOL/L (ref 23–29)
COLOR UR AUTO: YELLOW
CREAT SERPL-MCNC: 1.1 MG/DL (ref 0.5–1.4)
DIFFERENTIAL METHOD BLD: ABNORMAL
EOSINOPHIL # BLD AUTO: 0 K/UL (ref 0–0.5)
EOSINOPHIL NFR BLD: 0.2 % (ref 0–8)
ERYTHROCYTE [DISTWIDTH] IN BLOOD BY AUTOMATED COUNT: 14 % (ref 11.5–14.5)
EST. GFR  (NO RACE VARIABLE): 44 ML/MIN/1.73 M^2
GLUCOSE SERPL-MCNC: 106 MG/DL (ref 70–110)
GLUCOSE UR QL STRIP: NEGATIVE
HCO3 UR-SCNC: 26.1 MMOL/L (ref 24–28)
HCT VFR BLD AUTO: 36.5 % (ref 37–48.5)
HCV AB SERPL QL IA: NORMAL
HGB BLD-MCNC: 12 G/DL (ref 12–16)
HGB UR QL STRIP: ABNORMAL
HIV 1+2 AB+HIV1 P24 AG SERPL QL IA: NORMAL
HYALINE CASTS UR QL AUTO: 2 /LPF
IMM GRANULOCYTES # BLD AUTO: 0.11 K/UL (ref 0–0.04)
IMM GRANULOCYTES NFR BLD AUTO: 0.7 % (ref 0–0.5)
KETONES UR QL STRIP: NEGATIVE
LACTATE SERPL-SCNC: 1.5 MMOL/L (ref 0.5–2.2)
LEUKOCYTE ESTERASE UR QL STRIP: ABNORMAL
LYMPHOCYTES # BLD AUTO: 1.5 K/UL (ref 1–4.8)
LYMPHOCYTES NFR BLD: 9.7 % (ref 18–48)
MCH RBC QN AUTO: 31.2 PG (ref 27–31)
MCHC RBC AUTO-ENTMCNC: 32.9 G/DL (ref 32–36)
MCV RBC AUTO: 95 FL (ref 82–98)
MICROSCOPIC COMMENT: ABNORMAL
MONOCYTES # BLD AUTO: 1.3 K/UL (ref 0.3–1)
MONOCYTES NFR BLD: 8 % (ref 4–15)
NEUTROPHILS # BLD AUTO: 12.9 K/UL (ref 1.8–7.7)
NEUTROPHILS NFR BLD: 81.1 % (ref 38–73)
NITRITE UR QL STRIP: NEGATIVE
NON-SQ EPI CELLS #/AREA URNS AUTO: 0 /HPF
NRBC BLD-RTO: 0 /100 WBC
OHS QRS DURATION: 102 MS
OHS QTC CALCULATION: 505 MS
PCO2 BLDA: 51.6 MMHG (ref 35–45)
PH SMN: 7.31 [PH] (ref 7.35–7.45)
PH UR STRIP: 8 [PH] (ref 5–8)
PLATELET # BLD AUTO: 417 K/UL (ref 150–450)
PMV BLD AUTO: 9.5 FL (ref 9.2–12.9)
PO2 BLDA: 23 MMHG (ref 40–60)
POC BE: 0 MMOL/L
POC SATURATED O2: 33 % (ref 95–100)
POC TCO2: 28 MMOL/L (ref 24–29)
POTASSIUM SERPL-SCNC: 4.5 MMOL/L (ref 3.5–5.1)
PROT SERPL-MCNC: 6.7 G/DL (ref 6–8.4)
PROT UR QL STRIP: ABNORMAL
RBC # BLD AUTO: 3.85 M/UL (ref 4–5.4)
RBC #/AREA URNS AUTO: 14 /HPF (ref 0–4)
SAMPLE: ABNORMAL
SITE: ABNORMAL
SODIUM SERPL-SCNC: 134 MMOL/L (ref 136–145)
SP GR UR STRIP: 1.01 (ref 1–1.03)
SQUAMOUS #/AREA URNS AUTO: 0 /HPF
TSH SERPL DL<=0.005 MIU/L-ACNC: 1.65 UIU/ML (ref 0.4–4)
UNSPECIFIED CRY UR QL COMP ASSIST: 6
URN SPEC COLLECT METH UR: ABNORMAL
WBC # BLD AUTO: 15.94 K/UL (ref 3.9–12.7)
WBC #/AREA URNS AUTO: >100 /HPF (ref 0–5)
WBC CLUMPS UR QL AUTO: ABNORMAL

## 2024-04-09 PROCEDURE — 25000003 PHARM REV CODE 250

## 2024-04-09 PROCEDURE — 93010 ELECTROCARDIOGRAM REPORT: CPT | Mod: ,,, | Performed by: INTERNAL MEDICINE

## 2024-04-09 PROCEDURE — 63600175 PHARM REV CODE 636 W HCPCS

## 2024-04-09 PROCEDURE — 85025 COMPLETE CBC W/AUTO DIFF WBC: CPT | Performed by: EMERGENCY MEDICINE

## 2024-04-09 PROCEDURE — 92610 EVALUATE SWALLOWING FUNCTION: CPT

## 2024-04-09 PROCEDURE — 84425 ASSAY OF VITAMIN B-1: CPT

## 2024-04-09 PROCEDURE — 84443 ASSAY THYROID STIM HORMONE: CPT | Performed by: EMERGENCY MEDICINE

## 2024-04-09 PROCEDURE — 87077 CULTURE AEROBIC IDENTIFY: CPT | Mod: 59

## 2024-04-09 PROCEDURE — 87040 BLOOD CULTURE FOR BACTERIA: CPT

## 2024-04-09 PROCEDURE — 81001 URINALYSIS AUTO W/SCOPE: CPT | Performed by: EMERGENCY MEDICINE

## 2024-04-09 PROCEDURE — 93005 ELECTROCARDIOGRAM TRACING: CPT

## 2024-04-09 PROCEDURE — 63600175 PHARM REV CODE 636 W HCPCS: Performed by: EMERGENCY MEDICINE

## 2024-04-09 PROCEDURE — 80053 COMPREHEN METABOLIC PANEL: CPT | Performed by: EMERGENCY MEDICINE

## 2024-04-09 PROCEDURE — 11000001 HC ACUTE MED/SURG PRIVATE ROOM

## 2024-04-09 PROCEDURE — 25000003 PHARM REV CODE 250: Performed by: EMERGENCY MEDICINE

## 2024-04-09 PROCEDURE — 97535 SELF CARE MNGMENT TRAINING: CPT

## 2024-04-09 PROCEDURE — 87077 CULTURE AEROBIC IDENTIFY: CPT | Performed by: EMERGENCY MEDICINE

## 2024-04-09 PROCEDURE — 83605 ASSAY OF LACTIC ACID: CPT

## 2024-04-09 PROCEDURE — 86803 HEPATITIS C AB TEST: CPT | Performed by: PHYSICIAN ASSISTANT

## 2024-04-09 PROCEDURE — 87086 URINE CULTURE/COLONY COUNT: CPT | Performed by: EMERGENCY MEDICINE

## 2024-04-09 PROCEDURE — 87154 CUL TYP ID BLD PTHGN 6+ TRGT: CPT

## 2024-04-09 PROCEDURE — 25000003 PHARM REV CODE 250: Performed by: STUDENT IN AN ORGANIZED HEALTH CARE EDUCATION/TRAINING PROGRAM

## 2024-04-09 PROCEDURE — 87088 URINE BACTERIA CULTURE: CPT | Performed by: EMERGENCY MEDICINE

## 2024-04-09 PROCEDURE — 87186 SC STD MICRODIL/AGAR DIL: CPT | Performed by: EMERGENCY MEDICINE

## 2024-04-09 PROCEDURE — 97165 OT EVAL LOW COMPLEX 30 MIN: CPT

## 2024-04-09 PROCEDURE — 87184 SC STD DISK METHOD PER PLATE: CPT | Performed by: EMERGENCY MEDICINE

## 2024-04-09 PROCEDURE — 97530 THERAPEUTIC ACTIVITIES: CPT

## 2024-04-09 PROCEDURE — 97161 PT EVAL LOW COMPLEX 20 MIN: CPT

## 2024-04-09 PROCEDURE — 87389 HIV-1 AG W/HIV-1&-2 AB AG IA: CPT | Performed by: PHYSICIAN ASSISTANT

## 2024-04-09 RX ORDER — GLUCAGON 1 MG
1 KIT INJECTION
Status: DISCONTINUED | OUTPATIENT
Start: 2024-04-09 | End: 2024-04-15 | Stop reason: HOSPADM

## 2024-04-09 RX ORDER — IBUPROFEN 200 MG
24 TABLET ORAL
Status: DISCONTINUED | OUTPATIENT
Start: 2024-04-09 | End: 2024-04-15 | Stop reason: HOSPADM

## 2024-04-09 RX ORDER — POLYETHYLENE GLYCOL 3350 17 G/17G
17 POWDER, FOR SOLUTION ORAL DAILY
Status: DISCONTINUED | OUTPATIENT
Start: 2024-04-09 | End: 2024-04-15 | Stop reason: HOSPADM

## 2024-04-09 RX ORDER — HALOPERIDOL 5 MG/ML
2 INJECTION INTRAMUSCULAR ONCE
Status: COMPLETED | OUTPATIENT
Start: 2024-04-09 | End: 2024-04-09

## 2024-04-09 RX ORDER — SCOLOPAMINE TRANSDERMAL SYSTEM 1 MG/1
1 PATCH, EXTENDED RELEASE TRANSDERMAL
Status: DISCONTINUED | OUTPATIENT
Start: 2024-04-09 | End: 2024-04-09

## 2024-04-09 RX ORDER — LISINOPRIL 20 MG/1
40 TABLET ORAL DAILY
Status: DISCONTINUED | OUTPATIENT
Start: 2024-04-09 | End: 2024-04-15 | Stop reason: HOSPADM

## 2024-04-09 RX ORDER — HALOPERIDOL 5 MG/ML
5 INJECTION INTRAMUSCULAR
Status: COMPLETED | OUTPATIENT
Start: 2024-04-09 | End: 2024-04-09

## 2024-04-09 RX ORDER — LISINOPRIL 40 MG/1
40 TABLET ORAL DAILY
COMMUNITY
Start: 2023-12-19 | End: 2024-04-09

## 2024-04-09 RX ORDER — MELOXICAM 7.5 MG/1
1 TABLET ORAL DAILY
COMMUNITY

## 2024-04-09 RX ORDER — PROCHLORPERAZINE EDISYLATE 5 MG/ML
5 INJECTION INTRAMUSCULAR; INTRAVENOUS EVERY 6 HOURS PRN
Status: DISCONTINUED | OUTPATIENT
Start: 2024-04-09 | End: 2024-04-09

## 2024-04-09 RX ORDER — NEBIVOLOL 5 MG/1
1 TABLET ORAL DAILY
COMMUNITY

## 2024-04-09 RX ORDER — SODIUM CHLORIDE 0.9 % (FLUSH) 0.9 %
10 SYRINGE (ML) INJECTION EVERY 12 HOURS PRN
Status: DISCONTINUED | OUTPATIENT
Start: 2024-04-09 | End: 2024-04-15 | Stop reason: HOSPADM

## 2024-04-09 RX ORDER — IBUPROFEN 200 MG
16 TABLET ORAL
Status: DISCONTINUED | OUTPATIENT
Start: 2024-04-09 | End: 2024-04-15 | Stop reason: HOSPADM

## 2024-04-09 RX ORDER — HEPARIN SODIUM 5000 [USP'U]/ML
5000 INJECTION, SOLUTION INTRAVENOUS; SUBCUTANEOUS EVERY 8 HOURS
Status: DISCONTINUED | OUTPATIENT
Start: 2024-04-09 | End: 2024-04-15 | Stop reason: HOSPADM

## 2024-04-09 RX ORDER — ACETAMINOPHEN 500 MG
1000 TABLET ORAL EVERY 8 HOURS PRN
Status: DISCONTINUED | OUTPATIENT
Start: 2024-04-09 | End: 2024-04-14

## 2024-04-09 RX ORDER — METOPROLOL SUCCINATE 25 MG/1
25 TABLET, EXTENDED RELEASE ORAL 2 TIMES DAILY
Status: DISCONTINUED | OUTPATIENT
Start: 2024-04-09 | End: 2024-04-15 | Stop reason: HOSPADM

## 2024-04-09 RX ORDER — ACETAMINOPHEN 500 MG
1000 TABLET ORAL EVERY 8 HOURS PRN
COMMUNITY

## 2024-04-09 RX ORDER — ACETAMINOPHEN 325 MG/1
650 TABLET ORAL EVERY 8 HOURS PRN
Status: DISCONTINUED | OUTPATIENT
Start: 2024-04-09 | End: 2024-04-14

## 2024-04-09 RX ORDER — NALOXONE HCL 0.4 MG/ML
0.02 VIAL (ML) INJECTION
Status: DISCONTINUED | OUTPATIENT
Start: 2024-04-09 | End: 2024-04-15 | Stop reason: HOSPADM

## 2024-04-09 RX ORDER — ERGOCALCIFEROL 1.25 MG/1
50000 CAPSULE ORAL
COMMUNITY

## 2024-04-09 RX ORDER — HYDROCHLOROTHIAZIDE 25 MG/1
25 TABLET ORAL DAILY
Status: DISCONTINUED | OUTPATIENT
Start: 2024-04-09 | End: 2024-04-10

## 2024-04-09 RX ADMIN — LIDOCAINE HYDROCHLORIDE,EPINEPHRINE BITARTRATE 10 ML: 10; .01 INJECTION, SOLUTION INFILTRATION; PERINEURAL at 12:04

## 2024-04-09 RX ADMIN — HALOPERIDOL LACTATE 2 MG: 5 INJECTION, SOLUTION INTRAMUSCULAR at 09:04

## 2024-04-09 RX ADMIN — LISINOPRIL 40 MG: 20 TABLET ORAL at 11:04

## 2024-04-09 RX ADMIN — HALOPERIDOL LACTATE 5 MG: 5 INJECTION, SOLUTION INTRAMUSCULAR at 01:04

## 2024-04-09 RX ADMIN — HALOPERIDOL LACTATE 5 MG: 5 INJECTION, SOLUTION INTRAMUSCULAR at 12:04

## 2024-04-09 RX ADMIN — HYDROCHLOROTHIAZIDE 25 MG: 25 TABLET ORAL at 03:04

## 2024-04-09 RX ADMIN — METOPROLOL SUCCINATE 25 MG: 25 TABLET, EXTENDED RELEASE ORAL at 11:04

## 2024-04-09 RX ADMIN — CEFTRIAXONE 2 G: 2 INJECTION, POWDER, FOR SOLUTION INTRAMUSCULAR; INTRAVENOUS at 05:04

## 2024-04-09 RX ADMIN — HEPARIN SODIUM 5000 UNITS: 5000 INJECTION INTRAVENOUS; SUBCUTANEOUS at 10:04

## 2024-04-09 RX ADMIN — VANCOMYCIN HYDROCHLORIDE 1000 MG: 1 INJECTION, POWDER, LYOPHILIZED, FOR SOLUTION INTRAVENOUS at 06:04

## 2024-04-09 RX ADMIN — METOPROLOL SUCCINATE 25 MG: 25 TABLET, EXTENDED RELEASE ORAL at 09:04

## 2024-04-09 RX ADMIN — HEPARIN SODIUM 5000 UNITS: 5000 INJECTION INTRAVENOUS; SUBCUTANEOUS at 02:04

## 2024-04-09 NOTE — ED NOTES
Assumed care for pt after recieving report. Pt. resting in bed in NAD, RR e/u. Vital signs stable and within desired limits at this time of assessment. Explanation of care/wait provided. Bed in low, locked position with rails up and call bell in reach. Pt's white board updated with today's care team and plan. 1 : 1 visualization of pt maintained from nursing station      Patient identifiers for Jayne Aponte 89 y.o. female checked and correct.  Chief Complaint   Patient presents with    Fall     Trip and fall tn. Lac to forehead. Pt. Altered. No blood thinners. No LOC reported, but fall was not witnessed. Coming from [facility]. EMS and nursing facility reports pt. Has been confused and hallucinating X2 days.      Past Medical History:   Diagnosis Date    Cancer     Hypertension      Allergies reported: Review of patient's allergies indicates:  No Known Allergies      LOC: Patient is awake, alert, but unaware of environment. Patient is disoriented x 3 (oriented to name only) and speaking incoherently.  APPEARANCE: Patient resting comfortably and in no acute distress. Patient is clean and well groomed, patient's clothing is properly fastened.  HEENT: large lac to right forehead, stitches intact. Periorbital brusing to right eye and forehead from fall.   SKIN: The skin is warm and dry. Patient has normal skin turgor and moist mucus membranes.   MUSCULOSKELETAL: Patient is moving all extremities well, no obvious deformities noted. Pulses intact.   RESPIRATORY: Airway is open and patent. Respirations are spontaneous and non-labored with normal effort and rate.  CARDIAC: Patient has a normal rate and rhythm. 90 on cardiac monitor. No peripheral edema noted. Denies chest pain and SOB at at time of assessment.   ABDOMEN: No distention noted. Soft and non-tender upon palpation.  NEUROLOGICAL: pupils 2mm, PERRL. Facial expression is symmetrical.

## 2024-04-09 NOTE — PLAN OF CARE
Problem: Occupational Therapy  Goal: Occupational Therapy Goal  Description: Goals to be met by: 5/9/24 (1 month)     Patient will increase functional independence with ADLs by performing:    UE Dressing with SBA  Grooming while standing with SBA  Toileting from toilet with SBA for hygiene and clothing management.   Rolling to Bilateral with Stand-by Assistance.   Supine to sit with Stand-by Assistance.  Step transfer with SBA  Toilet transfer to toilet with SBA.    Evaluated pt and established OT POC. Continue OT as tolerated.  Shruthi Hernandez OT  4/9/2024    Outcome: Ongoing, Progressing

## 2024-04-09 NOTE — ED NOTES
Nurses Note -- 4 Eyes      4/9/2024   7:59 AM      Skin assessed during: Daily Assessment      [] No Altered Skin Integrity Present    []Prevention Measures Documented      [x] Yes- Altered Skin Integrity Present or Discovered   [x] LDA Added if Not in Epic (Describe Wound)   [x] New Altered Skin Integrity was Present on Admit and Documented in LDA   [x] Wound Image Taken    Wound Care Consulted? Yes    Attending Nurse:  Dona Kruse RN/Staff Member:   ANA Benz

## 2024-04-09 NOTE — ED PROVIDER NOTES
Encounter Date: 4/8/2024       History     Chief Complaint   Patient presents with    Fall     Trip and fall tn. Lac to forehead. Pt. Altered. No blood thinners. No LOC reported, but fall was not witnessed. Coming from [facility]. EMS and nursing facility reports pt. Has been confused and hallucinating X2 days.      This is primarily a procedural note for laceration repair.  Please see separate note for HPI, physical exam, and MDM.        Review of patient's allergies indicates:  No Known Allergies  Past Medical History:   Diagnosis Date    Cancer     Hypertension      Past Surgical History:   Procedure Laterality Date    BREAST SURGERY       History reviewed. No pertinent family history.  Social History     Tobacco Use    Smoking status: Never    Smokeless tobacco: Never   Substance Use Topics    Alcohol use: No     Review of Systems    Physical Exam     Initial Vitals [04/08/24 2211]   BP Pulse Resp Temp SpO2   (!) 193/100 96 18 96.4 °F (35.8 °C) 98 %      MAP       --         Physical Exam    ED Course   Lac Repair    Date/Time: 4/9/2024 4:20 AM    Performed by: Franklyn Macdonald MD  Authorized by: Tuyet Cooney MD    Consent:     Consent obtained:  Verbal    Risks discussed:  Pain  Laceration details:     Location:  Face    Face location:  Forehead    Length (cm):  7  Treatment:     Irrigation solution:  Sterile saline    Irrigation method:  Pressure wash  Skin repair:     Repair method:  Sutures    Suture size:  4-0    Suture material:  Nylon    Suture technique:  Simple interrupted    Number of sutures:  15  Approximation:     Approximation:  Close  Repair type:     Repair type:  Simple  Post-procedure details:     Dressing:  Open (no dressing)    Labs Reviewed   CBC W/ AUTO DIFFERENTIAL - Abnormal; Notable for the following components:       Result Value    WBC 15.94 (*)     RBC 3.85 (*)     Hematocrit 36.5 (*)     MCH 31.2 (*)     Immature Granulocytes 0.7 (*)     Gran # (ANC) 12.9 (*)     Immature  Grans (Abs) 0.11 (*)     Mono # 1.3 (*)     Gran % 81.1 (*)     Lymph % 9.7 (*)     All other components within normal limits   COMPREHENSIVE METABOLIC PANEL - Abnormal; Notable for the following components:    Sodium 134 (*)     CO2 21 (*)     Albumin 3.0 (*)     eGFR 44.0 (*)     All other components within normal limits   URINALYSIS, REFLEX TO URINE CULTURE - Abnormal; Notable for the following components:    Appearance, UA Hazy (*)     Protein, UA 1+ (*)     Occult Blood UA Trace (*)     Leukocytes, UA 3+ (*)     All other components within normal limits    Narrative:     Specimen Source->Urine   URINALYSIS MICROSCOPIC - Abnormal; Notable for the following components:    RBC, UA 14 (*)     WBC, UA >100 (*)     WBC Clumps, UA Occasional (*)     Hyaline Casts, UA 2 (*)     All other components within normal limits    Narrative:     Specimen Source->Urine   POCT GLUCOSE - Abnormal; Notable for the following components:    POCT Glucose 115 (*)     All other components within normal limits   ISTAT PROCEDURE - Abnormal; Notable for the following components:    POC PH 7.312 (*)     POC PCO2 51.6 (*)     POC PO2 23 (*)     All other components within normal limits   CULTURE, URINE   CULTURE, BLOOD   CULTURE, BLOOD   HIV 1 / 2 ANTIBODY    Narrative:     Release to patient->Immediate   HEPATITIS C ANTIBODY    Narrative:     Release to patient->Immediate   TSH   VITAMIN B1   LACTIC ACID, PLASMA   POCT GLUCOSE MONITORING CONTINUOUS          Imaging Results              CT Head Without Contrast (Final result)  Result time 04/09/24 00:31:32      Final result by Chas Chambers MD (04/09/24 00:31:32)                   Impression:      No acute intracranial abnormalities.    Senescent changes similar to prior.    Chronic left maxillary sinus disease, as above.      Electronically signed by: Chas Chambers MD  Date:    04/09/2024  Time:    00:31               Narrative:    EXAMINATION:  CT HEAD WITHOUT CONTRAST    CLINICAL  HISTORY:  Head trauma, minor (Age >= 65y);    TECHNIQUE:  Low dose axial images were obtained through the head.  Coronal and sagittal reformations were also performed. Contrast was not administered.    COMPARISON:  01/11/2017.    FINDINGS:  The brain parenchyma appears normal for age with good corticomedullary differentiation.  There is no evidence of acute infarct, hemorrhage, or mass.  There is ventricular and sulcal enlargement consistent with generalized atrophy.  Moderate confluent decreased supratentorial white matter attenuation most likely related to chronic nonspecific small vessel disease.   No mass-effect or midline shift.  There are no abnormal extra-axial fluid collections.  Left maxillary antrum completely opacified with thickening of the bony walls of the left maxillary sinus suggesting chronic sinus disease.  Remaining paranasal sinuses and mastoid air cells are essentially clear .  The calvarium appears intact.  .                                       CT Cervical Spine Without Contrast (Final result)  Result time 04/09/24 00:58:04      Final result by Moncho Mckinney DO (04/09/24 00:58:04)                   Impression:      No acute fracture or subluxation of the cervical spine.      Electronically signed by: Moncho Mckinney  Date:    04/09/2024  Time:    00:58               Narrative:    EXAMINATION:  CT CERVICAL SPINE WITHOUT CONTRAST    CLINICAL HISTORY:  Neck trauma (Age >= 65y);    TECHNIQUE:  Low dose axial images, sagittal and coronal reformations were performed though the cervical spine without intravenous contrast.    COMPARISON:  MRI cervical spine from 06/30/2017    FINDINGS:  Alignment: There is anterolisthesis of C4 on C5, retrolisthesis of C5 on C6, and anterolisthesis of C6 on C7.  There is straightening of the usual cervical lordosis.    Vertebra: There is no acute fracture or subluxation of the cervical spine.  The vertebral body heights are maintained.    Discs: There is  multilevel disc height loss, most significant at C5-C6 where there is severe disc height loss.    Degenerative changes: There are multilevel degenerative changes of the cervical spine, are similar when compared with MRI dated 06/30/2017.    Miscellaneous: The soft tissues of the neck are unremarkable.  There is a heterogeneous left thyroid lobe lesion, which can be further evaluated with nonemergent thyroid ultrasound if clinically indicated.  There are vascular calcifications.  There is scarring noted in the right lung apex.                                       X-Ray Chest AP Portable (Final result)  Result time 04/09/24 00:05:42      Final result by Moncho Mckinney DO (04/09/24 00:05:42)                   Impression:      No acute abnormality.      Electronically signed by: Moncho Mckinney  Date:    04/09/2024  Time:    00:05               Narrative:    EXAMINATION:  XR CHEST AP PORTABLE    CLINICAL HISTORY:  Chest Pain;    TECHNIQUE:  Single frontal view of the chest was performed.    COMPARISON:  01/09/2023.    FINDINGS:  The lungs are hyperexpanded and clear.  No focal opacities are seen.  The pleural spaces are clear.  The cardiac silhouette is unremarkable.  There are calcifications of the aortic arch.  Osseous structures demonstrate degenerative changes.                                       Medications   vancomycin - pharmacy to dose (has no administration in time range)   cefTRIAXone (ROCEPHIN) 2 g in dextrose 5 % in water (D5W) 100 mL IVPB (MB+) (has no administration in time range)   vancomycin (VANCOCIN) 1,000 mg in dextrose 5 % (D5W) 250 mL IVPB (Vial-Mate) (has no administration in time range)   LIDOcaine-EPINEPHrine 1%-1:100,000 injection 10 mL (10 mLs Intradermal Given by Provider 4/9/24 0051)   haloperidol lactate injection 5 mg (5 mg Intravenous Given 4/9/24 0038)   haloperidol lactate injection 5 mg (5 mg Intravenous Given 4/9/24 0153)     Medical Decision Making  Amount and/or Complexity of  Data Reviewed  Labs: ordered.  Radiology: ordered.    Risk  Prescription drug management.  Decision regarding hospitalization.                                      Clinical Impression:  Final diagnoses:  [W19.XXXA] Fall  [G93.40] Acute encephalopathy (Primary)  [S01.81XA] Laceration of forehead, initial encounter          ED Disposition Condition    Observation Stable                Franklyn Macdonald MD  Resident  04/18/24 3174       Tuyet Cooney MD  04/18/24 4923

## 2024-04-09 NOTE — ASSESSMENT & PLAN NOTE
Pt presenting with confusion, leukocytosis, UA was consistent with a urinary tract infection. Pt chart does not have any resistant cultures, and patient is unaware of any. With her at a nursing home there is risk for moire resistant bugs, but vital signs have been stable    Plan  Vanc/CTX  Bcx2  UC in process  Lactic was negative

## 2024-04-09 NOTE — H&P
[facility]irit [facility]argas - Emergency Dept  [facility] Medicine  History & Physical    Patient Name: Jayne Aponet  MRN: [id number]  Patient Class: OP- Observation  Admission Date: 4/8/2024  Attending Physician: Ronn Becerra MD   Primary Care Provider: KHANH Corea MD         Patient information was obtained from patient, past medical records, and ER records.     Subjective:     Principal Problem:<principal problem not specified>    Chief Complaint:   Chief Complaint   Patient presents with    Fall     Trip and fall tn. Lac to forehead. Pt. Altered. No blood thinners. No LOC reported, but fall was not witnessed. Coming from [facility]. EMS and nursing facility reports pt. Has been confused and hallucinating X2 days.         HPI: 89 yof with pmh on care everywhere of COPD, HTN, CKD3, colon cancer 2003, breast cancer 2006, skin cancer, dementia, functional decline, malnutrition presenting with worsening AMS and two falls at her nursing home. Pt had difficulty telling me why she was there. From discussion with ED staff from the nursing home pt has been confused and hallucinating for 2 days now, no known fevers. Fall was not witnessed but does not think she LOC. Pt does not know how she fell. Pt asked for how she was going to get a taxi during interview. Pt's paper work has that patient is a DNR    In the ED: CT head/neck negative for bleeds or fracture, glucose was WNL, Urine was positive for UTI. No prior resistant cultures, but higher risk coming from nursing home.     Past Medical History:   Diagnosis Date    Cancer     Hypertension        Past Surgical History:   Procedure Laterality Date    BREAST SURGERY         Review of patient's allergies indicates:  No Known Allergies    No current facility-administered medications on file prior to encounter.     Current Outpatient Medications on File Prior to Encounter   Medication Sig    metoprolol succinate (TOPROL-XL) 25 MG 24 hr tablet Take 25 mg by mouth once daily.      Family History    None       Tobacco Use    Smoking status: Never    Smokeless tobacco: Never   Substance and Sexual Activity    Alcohol use: No    Drug use: Not on file    Sexual activity: Not on file     Review of Systems   Reason unable to perform ROS: ams.   Constitutional:  Negative for chills and fever.   Respiratory:  Negative for cough and shortness of breath.    Cardiovascular:  Negative for chest pain and leg swelling.   Gastrointestinal:  Negative for nausea and vomiting.   Genitourinary:  Negative for difficulty urinating and dysuria.   Skin:  Negative for rash and wound.   Neurological:  Positive for dizziness, weakness, light-headedness and headaches (trauma to head).   Psychiatric/Behavioral:  Positive for confusion, decreased concentration and hallucinations.      Objective:     Vital Signs (Most Recent):  Temp: 96.4 °F (35.8 °C) (04/08/24 2211)  Pulse: 96 (04/08/24 2211)  Resp: 18 (04/08/24 2211)  BP: (!) 193/100 (04/08/24 2211)  SpO2: 98 % (04/08/24 2211) Vital Signs (24h Range):  Temp:  [96.4 °F (35.8 °C)] 96.4 °F (35.8 °C)  Pulse:  [96] 96  Resp:  [18] 18  SpO2:  [98 %] 98 %  BP: (193)/(100) 193/100     Weight: 54.4 kg (120 lb)  Body mass index is 19.97 kg/m².     Physical Exam  Constitutional:       Appearance: She is ill-appearing.   HENT:      Head: Normocephalic. Laceration present.      Comments: Large scalp wound, sutured up on R side of head.      Nose: No congestion or rhinorrhea.      Mouth/Throat:      Mouth: Mucous membranes are dry.      Pharynx: Oropharynx is clear.   Eyes:      General: No scleral icterus.     Extraocular Movements: Extraocular movements intact.   Neck:      Comments: Pt tender on back of neck after fall, imaging did not find fracture concern  Cardiovascular:      Rate and Rhythm: Normal rate and regular rhythm.   Pulmonary:      Effort: Pulmonary effort is normal. No respiratory distress.      Breath sounds: No wheezing.   Abdominal:      General: Abdomen is  flat. There is no distension.      Palpations: Abdomen is soft.      Tenderness: There is no abdominal tenderness.   Musculoskeletal:      Cervical back: Tenderness present. Pain with movement and muscular tenderness present.      Right lower leg: No edema.      Left lower leg: No edema.      Comments: Moves all extremities, BLE weakness, difficult following muscle testing   Skin:     General: Skin is warm and dry.      Coloration: Skin is pale.      Findings: Laceration present.      Comments: Laceration on R side of scalp and L tib/fib lesion wrapped up and bandage   Neurological:      Mental Status: She is alert. She is disoriented.      Motor: Weakness and atrophy present.      Comments: Pt knew who she was and the president, but slight confused on where she was and did not know the month or year. Thought it was 1920, and February. Difficult assessing CN with confusion, no obvious deficit   Psychiatric:         Cognition and Memory: Cognition is impaired. Memory is impaired. She exhibits impaired recent memory and impaired remote memory.                Significant Labs: All pertinent labs within the past 24 hours have been reviewed.    Significant Imaging: I have reviewed all pertinent imaging results/findings within the past 24 hours.  Assessment/Plan:     UTI (urinary tract infection)  Pt presenting with confusion, leukocytosis, UA was consistent with a urinary tract infection. Pt chart does not have any resistant cultures, and patient is unaware of any. With her at a nursing home there is risk for moire resistant bugs, but vital signs have been stable    Plan  Vanc/CTX  Bcx2  UC in process  Lactic was negative    HTN (hypertension)  Chronic, uncontrolled. Latest blood pressure and vitals reviewed-     Temp:  [96.4 °F (35.8 °C)-97 °F (36.1 °C)]   Pulse:  [94-96]   Resp:  [18-20]   BP: (175-193)/()   SpO2:  [95 %-98 %] .   Home meds for hypertension were reviewed and noted below.   Hypertension  Medications               lisinopriL (PRINIVIL,ZESTRIL) 40 MG tablet Take 40 mg by mouth once daily.    metoprolol succinate (TOPROL-XL) 25 MG 24 hr tablet Take 25 mg by mouth once daily.            While in the hospital, will manage blood pressure as follows; Continue home antihypertensive regimen    Will utilize p.r.n. blood pressure medication only if patient's blood pressure greater than 180/110 and she develops symptoms such as worsening chest pain or shortness of breath.      Pt takes lisinopril 40mg and metoprolol 25mg BID      Supraventricular tachycardia  Difficult to find her records, but from care everywhere patient is currently taking metoprolol tartrate 25mg BID for SVT. Pt's BP was elevated on arrival and looks like she may also take lisinopril      Stage 3 chronic kidney disease  Creatine stable for now. BMP reviewed- noted Estimated Creatinine Clearance: 21.6 mL/min (based on SCr of 1.1 mg/dL). according to latest data. Based on current GFR, CKD stage is stage 3 - GFR 30-59.  Monitor UOP and serial BMP and adjust therapy as needed. Renally dose meds. Avoid nephrotoxic medications and procedures.        Hyperlipidemia        Dementia  Pt has some baseline dementia, but for the last few days has been experiencing worsening symptoms of hallucinations and worsening confusion. Pt found to have a UTI. Most likely causing the worsening confusion      VTE Risk Mitigation (From admission, onward)           Ordered     Reason for No Pharmacological VTE Prophylaxis  Once        Question:  Reasons:  Answer:  Active Bleeding    04/09/24 0500     IP VTE HIGH RISK PATIENT  Once         04/09/24 0500     Place sequential compression device  Until discontinued         04/09/24 0500                           On 04/09/2024, patient should be placed in hospital observation services under my care in collaboration with Hospital medicine.    Pharmacokinetic Initial Assessment: IV Vancomycin    Assessment/Plan:    Initiate  "intravenous vancomycin with loading dose of 1000 mg once followed by a maintenance dose of vancomycin 750mg IV every 24 hours  Desired empiric serum trough concentration is 10 to 20 mcg/mL  Draw vancomycin trough level 60 min prior to third dose on 4/11 at approximately 0445  Pharmacy will continue to follow and monitor vancomycin.      Please contact pharmacy at extension [phone] with any questions regarding this assessment.     Thank you for the consult,   Yuni Echeverriauyen       Patient brief summary:  Jayne Aponte is a 89 y.o. female initiated on antimicrobial therapy with IV Vancomycin for treatment of suspected urinary tract infection    Drug Allergies:   Review of patient's allergies indicates:  No Known Allergies    Actual Body Weight:   54.4kg    Renal Function:   Estimated Creatinine Clearance: 21.6 mL/min (based on SCr of 1.1 mg/dL).,     Dialysis Method (if applicable):  N/A    CBC (last 72 hours):  Recent Labs   Lab Result Units 04/09/24  0031   WBC K/uL 15.94*   Hemoglobin g/dL 12.0   Hematocrit % 36.5*   Platelets K/uL 417   Gran % % 81.1*   Lymph % % 9.7*   Mono % % 8.0   Eosinophil % % 0.2   Basophil % % 0.3   Differential Method  Automated       Metabolic Panel (last 72 hours):  Recent Labs   Lab Result Units 04/09/24  0031 04/09/24  0301   Sodium mmol/L 134*  --    Potassium mmol/L 4.5  --    Chloride mmol/L 101  --    CO2 mmol/L 21*  --    Glucose mg/dL 106  --    Glucose, UA   --  Negative   BUN mg/dL 30  --    Creatinine mg/dL 1.1  --    Albumin g/dL 3.0*  --    Total Bilirubin mg/dL 0.4  --    Alkaline Phosphatase U/L 92  --    AST U/L 19  --    ALT U/L 11  --        Drug levels (last 3 results):  No results for input(s): "VANCOMYCINRA", "VANCORANDOM", "VANCOMYCINPE", "VANCOPEAK", "VANCOMYCINTR", "VANCOTROUGH" in the last 72 hours.    Microbiologic Results:  Microbiology Results (last 7 days)       Procedure Component Value Units Date/Time    Blood culture [[id number]] Collected: 04/09/24 0504    " Order Status: Sent Specimen: Blood from Peripheral, Antecubital, Left     Blood culture [[id number]] Collected: 04/09/24 0504    Order Status: Sent Specimen: Blood from Peripheral, Antecubital, Right     Urine culture [[id number]] Collected: 04/09/24 0301    Order Status: No result Specimen: Urine Updated: 04/09/24 0347              Steve Acuña DO  Department of Hospital Medicine  [facility] - Emergency Dept

## 2024-04-09 NOTE — ASSESSMENT & PLAN NOTE
Difficult to find her records, but from care everywhere patient is currently taking metoprolol tartrate 25mg BID for SVT. Pt's BP was elevated on arrival and looks like she may also take lisinopril

## 2024-04-09 NOTE — CARE UPDATE
I have reviewed the chart of Jayne ANGELINE Fadi who is hospitalized for the following:    Active Hospital Problems    Diagnosis    HTN (hypertension)    UTI (urinary tract infection)    Hyponatremia     POA, Na 134  BMP daily       Dementia    Supraventricular tachycardia    Stage 3 chronic kidney disease        Lucio Greco PA-C  [facility]

## 2024-04-09 NOTE — HPI
89 yof with pmh on care everywhere of COPD, HTN, CKD3, colon cancer 2003, breast cancer 2006, skin cancer, dementia, functional decline, malnutrition presenting with worsening AMS and two falls at her nursing home. Pt had difficulty telling me why she was there. From discussion with ED staff from the nursing home pt has been confused and hallucinating for 2 days now, no known fevers. Fall was not witnessed but does not think she LOC. Pt does not know how she fell. Pt asked for how she was going to get a taxi during interview. Pt's paper work has that patient is a DNR    In the ED: CT head/neck negative for bleeds or fracture, glucose was WNL, Urine was positive for UTI. No prior resistant cultures, but higher risk coming from nursing home.

## 2024-04-09 NOTE — PT/OT/SLP EVAL
Occupational Therapy   Co-Evaluation and Co-Treatment    Name: Jayne Aponte  MRN: [id number]  Admitting Diagnosis: <principal problem not specified>  Recent Surgery: * No surgery found *      Recommendations:     Discharge Recommendations: No Therapy Indicated (return to NH with 24/7 A)  Discharge Equipment Recommendations:  none  Barriers to discharge:  None    Assessment:     Jayne Aponte is a 89 y.o. female with a medical diagnosis of <principal problem not specified>.  She presents with impaired ADL and mobility performance deficits. Pt found upright in ED bed and agreeable for therapy. Session focused on EOB ADLs and standing trial. Pt required A for bed mobility and needed light A x2 for lateral steps at bedside using a RW. Pt able to answer all PLOF accurately however generalized confusion during session noted with pt needing redirection. PTA, pt was living at [facility] where she reports being mod I for ADLs/mobility using a RW+w/c. Pt would benefit from 24/7 A at this time. Patient continues to demonstrate the need for occupational therapy on a scheduled basis exhibited by decreased independence with self-care and functional mobility   Performance deficits affecting function: weakness, gait instability, impaired endurance, impaired balance, decreased lower extremity function, decreased upper extremity function, impaired skin, impaired self care skills, impaired functional mobility, impaired cognition, decreased safety awareness.      Rehab Prognosis: Good; patient would benefit from acute skilled OT services to address these deficits and reach maximum level of function.       Plan:     Patient to be seen 3 x/week to address the above listed problems via self-care/home management, therapeutic activities, therapeutic exercises, neuromuscular re-education  Plan of Care Expires: 05/09/24  Plan of Care Reviewed with: patient    Subjective     Chief Complaint: looking for purse and  "venitary  Patient/Family Comments/goals: "where's my wheelchair?"  And return to [facility]    Occupational Profile:  Living Environment: Pt lives at [facility] (~1.5 months per pt) in a third floor apartment with elevator access. Pt has a shower chair used for bathing. Staff A pt 3x/wk for bathing.  Previous level of function: Pt uses a RW/w/c for mobility. Pt goes downstairs for breakfast and lunch. Pt explains she's primarily mod I for UB/LB dressing with A for bathing. Pt reports 1  recent fall ~3/mo.  Roles and Routines: Home dwelling; enjoys talking to other residents.  Equipment Used at Home: wheelchair, shower chair, walker, rolling  Assistance upon Discharge: NH staff.    Pain/Comfort:  Pain Rating 1: 0/10  Pain Rating Post-Intervention 1: 0/10  Pain Rating Post-Intervention 2: 0/10    Patients cultural, spiritual, Voodoo conflicts given the current situation: no    Objective:   Co-treatment with PT for maximal pt participation, safety, and activity tolerance     Communicated with: RN prior to session.  Patient found HOB elevated with blood pressure cuff, PureWick, telemetry, peripheral IV upon OT entry to room.    General Precautions: Standard, fall, aspiration  Orthopedic Precautions: N/A  Braces: N/A  Respiratory Status: Room air    Occupational Performance:    Bed Mobility:    Patient completed Rolling/Turning to Right with maximal assistance  Patient completed Scooting/Bridging with maximal assistance  Patient completed Supine to Sit with maximal assistance  Patient completed Sit to Supine with moderate assistance    Functional Mobility/Transfers:  Patient completed Sit <> Stand Transfer with minimum assistance  with  rolling walker   Functional Mobility: Pt stood with min A using a RW and took 4 steps laterally towards HOB with min A using her RW.   Prior to standing, pt sat EOB with SBA-CGA ~8 minutes to feed and drink.    Activities of Daily Living:  Feeding:  stand by assistance " eating aristeo cracker and drinking water   Upper Body Dressing: minimum assistance donning gown  Lower Body Dressing: total assistance donning  socks     Cognitive/Visual Perceptual:  Cognitive/Psychosocial Skills:     -       Oriented to: Person and Situation   -       Follows Commands/attention:Easily distracted and Follows two-step commands  -       Communication: clear/fluent  -       Memory: Impaired LTM and Poor immediate recall  -       Safety awareness/insight to disability: impaired   -       Mood/Affect/Coping skills/emotional control: Appropriate to situation    Physical Exam:  Balance:    -       demo good sitting balance (Static), fair standing balance however posterior lean  Upper Extremity Range of Motion:     -       Right Upper Extremity: WFL  -       Left Upper Extremity: WFL  Upper Extremity Strength:    -       Right Upper Extremity: WFL  -       Left Upper Extremity: WFL   Strength:    -       Right Upper Extremity: WFL  -       Left Upper Extremity: WFL    AMPAC 6 Click ADL:  AMPAC Total Score: 17    Treatment & Education:  Pt educated on role of occupational therapy, POC, and safety during ADLs and functional mobility. Pt and OT discussed importance of safe, continued mobility to optimize daily living skills. Pt verbalized understanding.     White board updated during session. Pt given instruction to call for medical staff/nurse for assistance.       Patient left HOB elevated with all lines intact, call button in reach, and RN notified    GOALS:   Multidisciplinary Problems       Occupational Therapy Goals          Problem: Occupational Therapy    Goal Priority Disciplines Outcome Interventions   Occupational Therapy Goal     OT, PT/OT Ongoing, Progressing    Description: Goals to be met by: 5/9/24 (1 month)     Patient will increase functional independence with ADLs by performing:    UE Dressing with SBA  Grooming while standing with SBA  Toileting from toilet with SBA for hygiene and  clothing management.   Rolling to Bilateral with Stand-by Assistance.   Supine to sit with Stand-by Assistance.  Step transfer with SBA  Toilet transfer to toilet with SBA                       History:     Past Medical History:   Diagnosis Date    Cancer     Hypertension          Past Surgical History:   Procedure Laterality Date    BREAST SURGERY         Time Tracking:     OT Date of Treatment: 04/09/24  OT Start Time: 0908  OT Stop Time: 0930  OT Total Time (min): 22 min    Billable Minutes:Evaluation 10 min  Self Care/Home Management 12 min    4/9/2024

## 2024-04-09 NOTE — PROGRESS NOTES
"Pharmacokinetic Initial Assessment: IV Vancomycin    Assessment/Plan:    Initiate intravenous vancomycin with loading dose of 1000 mg once followed by a maintenance dose of vancomycin 750mg IV every 24 hours  Desired empiric serum trough concentration is 10 to 20 mcg/mL  Draw vancomycin trough level 60 min prior to third dose on 4/11 at approximately 0445  Pharmacy will continue to follow and monitor vancomycin.      Please contact pharmacy at extension [phone] with any questions regarding this assessment.     Thank you for the consult,   Yuni Echeverriauyen       Patient brief summary:  Jayne Aponte is a 89 y.o. female initiated on antimicrobial therapy with IV Vancomycin for treatment of suspected urinary tract infection    Drug Allergies:   Review of patient's allergies indicates:  No Known Allergies    Actual Body Weight:   54.4kg    Renal Function:   Estimated Creatinine Clearance: 21.6 mL/min (based on SCr of 1.1 mg/dL).,     Dialysis Method (if applicable):  N/A    CBC (last 72 hours):  Recent Labs   Lab Result Units 04/09/24  0031   WBC K/uL 15.94*   Hemoglobin g/dL 12.0   Hematocrit % 36.5*   Platelets K/uL 417   Gran % % 81.1*   Lymph % % 9.7*   Mono % % 8.0   Eosinophil % % 0.2   Basophil % % 0.3   Differential Method  Automated       Metabolic Panel (last 72 hours):  Recent Labs   Lab Result Units 04/09/24  0031 04/09/24  0301   Sodium mmol/L 134*  --    Potassium mmol/L 4.5  --    Chloride mmol/L 101  --    CO2 mmol/L 21*  --    Glucose mg/dL 106  --    Glucose, UA   --  Negative   BUN mg/dL 30  --    Creatinine mg/dL 1.1  --    Albumin g/dL 3.0*  --    Total Bilirubin mg/dL 0.4  --    Alkaline Phosphatase U/L 92  --    AST U/L 19  --    ALT U/L 11  --        Drug levels (last 3 results):  No results for input(s): "VANCOMYCINRA", "VANCORANDOM", "VANCOMYCINPE", "VANCOPEAK", "VANCOMYCINTR", "VANCOTROUGH" in the last 72 hours.    Microbiologic Results:  Microbiology Results (last 7 days)       Procedure " Component Value Units Date/Time    Blood culture [[id number]] Collected: 04/09/24 0504    Order Status: Sent Specimen: Blood from Peripheral, Antecubital, Left     Blood culture [[id number]] Collected: 04/09/24 0504    Order Status: Sent Specimen: Blood from Peripheral, Antecubital, Right     Urine culture [[id number]] Collected: 04/09/24 0301    Order Status: No result Specimen: Urine Updated: 04/09/24 0343

## 2024-04-09 NOTE — ASSESSMENT & PLAN NOTE
Creatine stable for now. BMP reviewed- noted Estimated Creatinine Clearance: 21.6 mL/min (based on SCr of 1.1 mg/dL). according to latest data. Based on current GFR, CKD stage is stage 3 - GFR 30-59.  Monitor UOP and serial BMP and adjust therapy as needed. Renally dose meds. Avoid nephrotoxic medications and procedures.

## 2024-04-09 NOTE — PT/OT/SLP EVAL
Speech Language Pathology Evaluation  Bedside Swallow  Discharge    Patient Name:  Jayne Aponte   MRN:  [id number]  ED 16/16    Admitting Diagnosis: <principal problem not specified>    Recommendations:                  General Recommendations:  Follow-up not indicated  Diet recommendations:  Soft & Bite Sized Diet - IDDSI Level 6 (elliott chopped meats), Thin liquids - IDDSI Level 0 (no straws)   Aspiration Precautions:   1 small bite/sip at a time,   Assistance with meals,   Check for pocketing/oral residue,   Eliminate distractions,   Feed only when awake/alert,   HOB to 90 degrees,   Monitor for s/s of aspiration,   No straws  General Precautions: Standard, aspiration  Communication strategies:  provide increased time to answer and go to room if call light pushed    Assessment:     Jayne Aponte is a 89 y.o. female with adequate tolerance of baseline diet and thin liquids. No further skilled acute Speech Therapy services warranted at this time. Please re-consult as needed.      History:     Past Medical History:   Diagnosis Date    Cancer     Hypertension        Past Surgical History:   Procedure Laterality Date    BREAST SURGERY       MD note: HPI: 89 yof with pmh on care everywhere of COPD, HTN, CKD3, colon cancer 2003, breast cancer 2006, skin cancer, dementia, functional decline, malnutrition presenting with worsening AMS and two falls at her nursing home. Pt had difficulty telling me why she was there. From discussion with ED staff from the nursing home pt has been confused and hallucinating for 2 days now, no known fevers. Fall was not witnessed but does not think she LOC. Pt does not know how she fell. Pt asked for how she was going to get a taxi during interview. Pt's paper work has that patient is a DNR  In the ED: CT head/neck negative for bleeds or fracture, glucose was WNL, Urine was positive for UTI. No prior resistant cultures, but higher risk coming from nursing home.     Social History: Patient  "lives in [place].     Chest X-Rays: HPI: 89 yof with pmh on care everywhere of COPD, HTN, CKD3, colon cancer 2003, breast cancer 2006, skin cancer, dementia, functional decline, malnutrition presenting with worsening AMS and two falls at her nursing home. Pt had difficulty telling me why she was there. From discussion with ED staff from the nursing home pt has been confused and hallucinating for 2 days now, no known fevers. Fall was not witnessed but does not think she LOC. Pt does not know how she fell. Pt asked for how she was going to get a taxi during interview. Pt's paper work has that patient is a DNR  In the ED: CT head/neck negative for bleeds or fracture, glucose was WNL, Urine was positive for UTI. No prior resistant cultures, but higher risk coming from nursing home.     Prior diet: regular/thin with partials; chopped meats without partials    Subjective     Patient awake and cooperative.   Patient seen sitting EOB with PT/OT.  SLP communicated with RN prior to entry. RN cleared SLP to administer po trials. RN reports coughing/choking with straws.   Patient states, "I never use straws. They make it harder."    Objective:     Oral Musculature Evaluation  Oral Musculature: general weakness (c/w age)  Dentition: scattered dentition (uses partials to eat, hwoever, waiting on new ones from dentist- currently eating without them)  Mucosal Quality: good  Volitional Swallow: timely  Voice Prior to PO Intake: clear    Bedside Swallow Eval:   Consistencies Assessed:  Thin liquids sips of water via cup sips  Puree bites of apple sauce  Solids bites of aristeo cracker    Oral Phase:   WFL  Patient reports requiring chopped meats    Pharyngeal Phase:   Throat clearing appreciated with initial sips of water, however, no coughing/choking. Vocal quality remained clear. O2 sats remained consistent.  With all further trials:   no overt clinical signs/symptoms of aspiration  no overt clinical signs/symptoms of pharyngeal " dysphagia    Compensatory Strategies  None    Treatment: SLP provided education on SLP recommendations and SLP role. Patient verbalized understanding of all discussed, however, would benefit from ongoing reinforcement of safe swallowing precautions. SLP communicated recommendations with RN.     Goals:   Multidisciplinary Problems       SLP Goals       Not on file              Multidisciplinary Problems (Resolved)          Problem: SLP    Goal Priority Disciplines Outcome   SLP Goal   (Resolved)     SLP Met                       Plan:       Plan of Care reviewed with:  patient   SLP Follow-Up:  No       Discharge recommendations:  Therapy Intensity Recommendations at Discharge: No Therapy Indicated   Barriers to Discharge:  None    Time Tracking:     SLP Treatment Date:   04/09/24  Speech Start Time:  0909  Speech Stop Time:  0926     Speech Total Time (min):  17 min    Billable Minutes: Eval Swallow and Oral Function 17    04/09/2024

## 2024-04-09 NOTE — SUBJECTIVE & OBJECTIVE
Past Medical History:   Diagnosis Date    Cancer     Hypertension        Past Surgical History:   Procedure Laterality Date    BREAST SURGERY         Review of patient's allergies indicates:  No Known Allergies    No current facility-administered medications on file prior to encounter.     Current Outpatient Medications on File Prior to Encounter   Medication Sig    metoprolol succinate (TOPROL-XL) 25 MG 24 hr tablet Take 25 mg by mouth once daily.     Family History    None       Tobacco Use    Smoking status: Never    Smokeless tobacco: Never   Substance and Sexual Activity    Alcohol use: No    Drug use: Not on file    Sexual activity: Not on file     Review of Systems   Reason unable to perform ROS: ams.   Constitutional:  Negative for chills and fever.   Respiratory:  Negative for cough and shortness of breath.    Cardiovascular:  Negative for chest pain and leg swelling.   Gastrointestinal:  Negative for nausea and vomiting.   Genitourinary:  Negative for difficulty urinating and dysuria.   Skin:  Negative for rash and wound.   Neurological:  Positive for dizziness, weakness, light-headedness and headaches (trauma to head).   Psychiatric/Behavioral:  Positive for confusion, decreased concentration and hallucinations.      Objective:     Vital Signs (Most Recent):  Temp: 96.4 °F (35.8 °C) (04/08/24 2211)  Pulse: 96 (04/08/24 2211)  Resp: 18 (04/08/24 2211)  BP: (!) 193/100 (04/08/24 2211)  SpO2: 98 % (04/08/24 2211) Vital Signs (24h Range):  Temp:  [96.4 °F (35.8 °C)] 96.4 °F (35.8 °C)  Pulse:  [96] 96  Resp:  [18] 18  SpO2:  [98 %] 98 %  BP: (193)/(100) 193/100     Weight: 54.4 kg (120 lb)  Body mass index is 19.97 kg/m².     Physical Exam  Constitutional:       Appearance: She is ill-appearing.   HENT:      Head: Normocephalic. Laceration present.      Comments: Large scalp wound, sutured up on R side of head.      Nose: No congestion or rhinorrhea.      Mouth/Throat:      Mouth: Mucous membranes are dry.       Pharynx: Oropharynx is clear.   Eyes:      General: No scleral icterus.     Extraocular Movements: Extraocular movements intact.   Neck:      Comments: Pt tender on back of neck after fall, imaging did not find fracture concern  Cardiovascular:      Rate and Rhythm: Normal rate and regular rhythm.   Pulmonary:      Effort: Pulmonary effort is normal. No respiratory distress.      Breath sounds: No wheezing.   Abdominal:      General: Abdomen is flat. There is no distension.      Palpations: Abdomen is soft.      Tenderness: There is no abdominal tenderness.   Musculoskeletal:      Cervical back: Tenderness present. Pain with movement and muscular tenderness present.      Right lower leg: No edema.      Left lower leg: No edema.      Comments: Moves all extremities, BLE weakness, difficult following muscle testing   Skin:     General: Skin is warm and dry.      Coloration: Skin is pale.      Findings: Laceration present.      Comments: Laceration on R side of scalp and L tib/fib lesion wrapped up and bandage   Neurological:      Mental Status: She is alert. She is disoriented.      Motor: Weakness and atrophy present.      Comments: Pt knew who she was and the president, but slight confused on where she was and did not know the month or year. Thought it was 1920, and February. Difficult assessing CN with confusion, no obvious deficit   Psychiatric:         Cognition and Memory: Cognition is impaired. Memory is impaired. She exhibits impaired recent memory and impaired remote memory.                Significant Labs: All pertinent labs within the past 24 hours have been reviewed.    Significant Imaging: I have reviewed all pertinent imaging results/findings within the past 24 hours.

## 2024-04-09 NOTE — PLAN OF CARE
PT evaluation completed- see note for details. PT POC and goals established.    Problem: Physical Therapy  Goal: Physical Therapy Goal  Description: Goals to be met by: 24     Patient will increase functional independence with mobility by performin. Supine to sit with Contact Guard Assistance  2. Sit to supine with Contact Guard Assistance  3. Rolling to Left and Right with Contact Guard Assistance.  4. Sit to stand transfer with Contact Guard Assistance  5. Bed to chair transfer with Contact Guard Assistance using LRAD  6. Gait  x 25 feet with Contact Guard Assistance using LRAD.   7. Lower extremity exercise program x15 reps per handout, with assistance as needed    Outcome: Ongoing, Progressing

## 2024-04-09 NOTE — PHARMACY MED REC
"  Admission Medication History     The home medication history was taken by Karla Gallo.    You may go to "Admission" then "Reconcile Home Medications" tabs to review and/or act upon these items.     The home medication list has been updated by the Pharmacy department.   Please read ALL comments highlighted in yellow.   Please address this information as you see fit.    Feel free to contact us if you have any questions or require assistance.      Medications listed below were obtained from: Nursing home  Current Outpatient Medications on File Prior to Encounter   Medication Sig    acetaminophen (TYLENOL) 500 MG tablet   Take 1,000 mg by mouth every 8 (eight) hours as needed for Pain.    ergocalciferol (VITAMIN D2) 50,000 unit Cap   Take 50,000 Units by mouth every 7 days. (FRIDAY)    meloxicam (MOBIC) 7.5 MG tablet   Take 1 tablet by mouth once daily.    nebivoloL (BYSTOLIC) 5 MG Tab   Take 1 tablet by mouth once daily.       Potential issues to be addressed PRIOR TO DISCHARGE  The listed medications were obtained from another facility ([facility]). The patient may not have been able to fill these prescriptions prior to this admission and may require new scripts upon discharge.           Karla Gallo  EXT [id number]                .          "

## 2024-04-09 NOTE — PLAN OF CARE
Problem: SLP  Goal: SLP Goal  Outcome: Met  Bedside Swallow Study completed. Recommend: soft and bite sized diet (finely chopped meats), thin liquids, NO STRAWS, assistance with meals, and standard aspiration precautions. No further skilled ST services warranted at this time. Please re-consult as needed.

## 2024-04-09 NOTE — PROGRESS NOTES
Therapy with vancomycin complete and/or consult discontinued by provider.  Pharmacy will sign off, please re-consult as needed.    Thank you!    Julia WellsD.

## 2024-04-09 NOTE — ASSESSMENT & PLAN NOTE
Pt has some baseline dementia, but for the last few days has been experiencing worsening symptoms of hallucinations and worsening confusion. Pt found to have a UTI. Most likely causing the worsening confusion

## 2024-04-09 NOTE — PLAN OF CARE
[facility] - Emergency Dept  Discharge Assessment    Primary Care Provider: KHANH Corea MD     PT is a resident of [facility].  Pt to return to nursing home on d/c     Discharge Assessment (most recent)       BRIEF DISCHARGE ASSESSMENT - 04/09/24 0807          Discharge Planning    Assessment Type Discharge Planning Brief Assessment (P)      Resource/Environmental Concerns none (P)      Support Systems Friends/neighbors;Home care staff (P)      Current Living Arrangements residential facility (P)      Care Facility Name [facility] (P)      Patient/Family Anticipates Transition to long-term care facility (P)      Patient/Family Anticipated Services at Transition none (P)      DME Needed Upon Discharge  none (P)      Discharge Plan A Return to nursing home (P)      Discharge Plan B Return to Nursing Home (P)         Physical Activity    On average, how many days per week do you engage in moderate to strenuous exercise (like a brisk walk)? Patient unable to answer (P)      On average, how many minutes do you engage in exercise at this level? Patient unable to answer (P)         Financial Resource Strain    How hard is it for you to pay for the very basics like food, housing, medical care, and heating? Patient unable to answer (P)         Housing Stability    In the last 12 months, was there a time when you were not able to pay the mortgage or rent on time? Patient unable to answer (P)      In the last 12 months, was there a time when you did not have a steady place to sleep or slept in a shelter (including now)? Patient unable to answer (P)         Transportation Needs    In the past 12 months, has lack of transportation kept you from medical appointments or from getting medications? Patient unable to answer (P)      In the past 12 months, has lack of transportation kept you from meetings, work, or from getting things needed for daily living? Patient unable to answer (P)         Food Insecurity    Within the  past 12 months, you worried that your food would run out before you got the money to buy more. Patient unable to answer (P)      Within the past 12 months, the food you bought just didn't last and you didn't have money to get more. Patient unable to answer (P)         Stress    Do you feel stress - tense, restless, nervous, or anxious, or unable to sleep at night because your mind is troubled all the time - these days? Patient unable to answer (P)         Social Connections    In a typical week, how many times do you talk on the phone with family, friends, or neighbors? Patient unable to answer (P)      How often do you get together with friends or relatives? Patient unable to answer (P)      How often do you attend Taoist or Jainism services? Patient unable to answer (P)      Do you belong to any clubs or organizations such as Taoist groups, unions, fraternal or athletic groups, or school groups? Patient unable to answer (P)      How often do you attend meetings of the clubs or organizations you belong to? Patient unable to answer (P)      Are you , , , , never , or living with a partner? Patient unable to answer (P)         Alcohol Use    Q1: How often do you have a drink containing alcohol? Patient unable to answer (P)      Q2: How many drinks containing alcohol do you have on a typical day when you are drinking? Patient unable to answer (P)      Q3: How often do you have six or more drinks on one occasion? Patient unable to answer (P)                      Francisca Irizarry CD, MSW, LMSW, RSW   Case Management  [facility]  Email: [email or web address]

## 2024-04-09 NOTE — ASSESSMENT & PLAN NOTE
Chronic, uncontrolled. Latest blood pressure and vitals reviewed-     Temp:  [96.4 °F (35.8 °C)-97 °F (36.1 °C)]   Pulse:  [94-96]   Resp:  [18-20]   BP: (175-193)/()   SpO2:  [95 %-98 %] .   Home meds for hypertension were reviewed and noted below.   Hypertension Medications               lisinopriL (PRINIVIL,ZESTRIL) 40 MG tablet Take 40 mg by mouth once daily.    metoprolol succinate (TOPROL-XL) 25 MG 24 hr tablet Take 25 mg by mouth once daily.            While in the hospital, will manage blood pressure as follows; Continue home antihypertensive regimen    Will utilize p.r.n. blood pressure medication only if patient's blood pressure greater than 180/110 and she develops symptoms such as worsening chest pain or shortness of breath.      Pt takes lisinopril 40mg and metoprolol 25mg BID

## 2024-04-09 NOTE — ED NOTES
Primary team at bedside evaluating patient. Purewick applied per MD request. Resting comfortably, NAD noted.

## 2024-04-09 NOTE — NURSING
Nurses Note -- 4 Eyes      4/9/2024   6:10 PM      Skin assessed during: Admit      [] No Altered Skin Integrity Present    []Prevention Measures Documented      [x] Yes- Altered Skin Integrity Present or Discovered   [x] LDA Added if Not in Epic (Describe Wound)   [x] New Altered Skin Integrity was Present on Admit and Documented in LDA   [] Wound Image Taken    Wound Care Consulted? Yes    Attending Nurse:  Liz Kruse RN/Staff Member:   ANA Flores

## 2024-04-09 NOTE — PT/OT/SLP EVAL
Physical Therapy Co-Evaluation and Co-Treatment    Patient Name:  Jayne Aponte   MRN:  [id number]  Admit Date: 4/8/2024  Admitting Diagnosis:  <principal problem not specified>   Length of Stay: 0 days  Recent Surgery: * No surgery found *      Recommendations:     Discharge Recommendations:    No Therapy Indicated (return to NH with 24/7 A)  Discharge Equipment Recommendations: none   Barriers to discharge: None      Plan:     During this hospitalization, patient to be seen 3 x/week to address the identified rehab impairments via gait training, therapeutic activities, therapeutic exercises, neuromuscular re-education and progress towards the established goals.  Plan of Care Expires:  05/09/24  Plan of Care Reviewed with: patient    Assessment:     Jayne Aponte is a 89 y.o. female admitted with a medical diagnosis of supraventricular tachycardia. Pt found supine on stretcher agreeable to therapy session. Pt AAOx3 (not oriented to place) and able to answer all history questions during evaluation, but pt required intermittent redirection at times due to confusion and distractable. Pt requiring increased assistance with bed mobility this date but light assistance to stand and take side steps along EOB. PTA, pt resided at [facility] and reports using w/c or RW for mobility; pt reports Ind with dressing and toileting but assist required for bathing. Patient would benefit from skilled therapy services to maximize safety and independence, increase activity tolerance, decrease fall risk, decrease caregiver burden, improve QOL, improve patient's functional mobility, and decrease risk of contractures and pressure sores.     Problem List: weakness, impaired endurance, impaired self care skills, impaired functional mobility, gait instability, impaired balance, decreased upper extremity function, decreased lower extremity function, decreased safety awareness, impaired skin.  Rehab Prognosis: Fair; patient would benefit  from acute skilled PT services to address these deficits and reach maximum level of function.      Goals:   Multidisciplinary Problems       Physical Therapy Goals          Problem: Physical Therapy    Goal Priority Disciplines Outcome Goal Variances Interventions   Physical Therapy Goal     PT, PT/OT Ongoing, Progressing     Description: Goals to be met by: 24     Patient will increase functional independence with mobility by performin. Supine to sit with Contact Guard Assistance  2. Sit to supine with Contact Guard Assistance  3. Rolling to Left and Right with Contact Guard Assistance.  4. Sit to stand transfer with Contact Guard Assistance  5. Bed to chair transfer with Contact Guard Assistance using LRAD  6. Gait  x 25 feet with Contact Guard Assistance using LRAD.   7. Lower extremity exercise program x15 reps per handout, with assistance as needed                         Subjective     RN notified prior to session. No one present upon PT entrance into room. Patient agreeable to PT evaluation.    Chief Complaint:   Patient/Family Comments/goals: none stated  Pain/Comfort:  Pain Rating 1: 0/10  Pain Rating Post-Intervention 1: 0/10    Social History:  Residence: Pt lives at [facility].   Support available:  facility staff  Equipment Owned (using): walker, rolling, wheelchair, shower chair  Prior level of function: reports using w/c or RW for mobility; pt reports Ind with dressing and toileting but assist required for bathing. Pt reports going down to dining room for lunch and dinner but breakfast is brought to her in her room.       Patient reports they will have assistance from facility staff upon discharge.    Objective:     Additional staff present: OT; OT for co-evaluation due to suspected patient need for two skilled therapists to appropriately assess patient's functional deficits as well as ensure patient safety, accommodate for limited activity tolerance, and provide appropriate, skilled  "assistance to maximize functional potential during evaluation.    Patient found supine with: blood pressure cuff, peripheral IV, telemetry, PureWick     General Precautions: Standard, Cardiac fall, aspiration   Orthopedic Precautions:N/A   Braces: N/A   Body mass index is 19.97 kg/m².  Oxygen Device: Room Air  Vitals: BP (!) 186/84   Pulse 92   Temp 97.3 °F (36.3 °C) (Axillary)   Resp (!) 26   Ht 5' 5" (1.651 m)   Wt 54.4 kg (120 lb)   SpO2 98%   BMI 19.97 kg/m²       Exams:    Cognition:  Oriented X 3, Alert, Distractible, and Confused  Command following: Follows one-step verbal commands  Communication: clear/fluent    Sensation:   Light touch sensation: Intact BLEs    Gross Motor Coordination: No deficits noted during functional mobility tasks     Edema/Skin Integrity: None noted; Thin, Dry, and bruising and wound of forehead    Postural examination/scapula alignment: Rounded shoulder, Head forward, and Slouched posture    Lower Extremity Range of Motion:  Right Lower Extremity: WFL  Left Lower Extremity: WFL    Lower Extremity Strength:  Right Lower Extremity: Not formerly tested but weakness of all major muscle groups identified  Left Lower Extremity: Not formerly tested but weakness of all major muscle groups identified      Functional Mobility:    Bed Mobility:  Supine > Sit with maximal assistance  Sit > Supine with moderate assistance    Transfers:   Sit <> Stand Transfer: Minimal Assistance x 1 trials from EOB with RW               Gait:  Distance: 4 side steps R along EOB  Assistance level: Minimal Assistance and of 2 persons  Assistive Device:  rolling walker  Gait Deviation(s): unsteady gait, decreased step length, narrow base of support, decreased weight shift, flexed posture, and decreased corbin  all lines remained intact throughout ambulation trial  Comments: pt with fwd lean onto RW, mild instability and small step length.    Balance:  Sitting Balance: 8 minutes EOB for feeding  Static: " stand by assistance  Dynamic: contact guard assistance  Standing:  Static: minimum assistance  Dynamic: minimum assistance and of 2 persons    Outcome Measures:  AM-PAC 6 CLICK MOBILITY  Turning over in bed (including adjusting bedclothes, sheets and blankets)?: 3  Sitting down on and standing up from a chair with arms (e.g., wheelchair, bedside commode, etc.): 3  Moving from lying on back to sitting on the side of the bed?: 2  Moving to and from a bed to a chair (including a wheelchair)?: 2  Need to walk in hospital room?: 2  Climbing 3-5 steps with a railing?: 1  Basic Mobility Total Score: 13     Education:  Time provided for education, counseling and discussion of health disposition in regards to patient's current status  All questions answered within PT scope of practice and to patient's satisfaction  PT role in POC to address current functional deficits    Patient left supine with all lines intact, call button in reach, and RN notified.      History:     Past Medical History:   Diagnosis Date    Cancer     Hypertension        Past Surgical History:   Procedure Laterality Date    BREAST SURGERY         History reviewed. No pertinent family history.    Social History     Socioeconomic History    Marital status:    Tobacco Use    Smoking status: Never    Smokeless tobacco: Never   Substance and Sexual Activity    Alcohol use: No     Social Determinants of Health     Financial Resource Strain: Patient Unable To Answer (4/9/2024)    Overall Financial Resource Strain (CARDIA)     Difficulty of Paying Living Expenses: Patient unable to answer   Food Insecurity: Patient Unable To Answer (4/9/2024)    Hunger Vital Sign     Worried About Running Out of Food in the Last Year: Patient unable to answer     Ran Out of Food in the Last Year: Patient unable to answer   Transportation Needs: Patient Unable To Answer (4/9/2024)    PRAPARE - Transportation     Lack of Transportation (Medical): Patient unable to answer      Lack of Transportation (Non-Medical): Patient unable to answer   Physical Activity: Patient Unable To Answer (4/9/2024)    Exercise Vital Sign     Days of Exercise per Week: Patient unable to answer     Minutes of Exercise per Session: Patient unable to answer   Stress: Patient Unable To Answer (4/9/2024)    [employer] - Occupational Stress Questionnaire     Feeling of Stress : Patient unable to answer   Social Connections: Patient Unable To Answer (4/9/2024)    Social Connection and Isolation Panel [NHANES]     Frequency of Communication with Friends and Family: Patient unable to answer     Frequency of Social Gatherings with Friends and Family: Patient unable to answer     Attends Church Services: Patient unable to answer     Active Member of Clubs or Organizations: Patient unable to answer     Attends Club or Organization Meetings: Patient unable to answer     Marital Status: Patient unable to answer   Housing Stability: Patient Unable To Answer (4/9/2024)    Housing Stability Vital Sign     Unable to Pay for Housing in the Last Year: Patient unable to answer     Unstable Housing in the Last Year: Patient unable to answer       Time Tracking:     PT Received On: 04/09/24  PT Start Time: 0908     PT Stop Time: 0930  PT Total Time (min): 22 min     Billable Minutes: Evaluation 10 minutes and Therapeutic Activity 12 minutes    4/9/2024

## 2024-04-10 PROBLEM — G93.41 ENCEPHALOPATHY, METABOLIC: Status: ACTIVE | Noted: 2024-04-10

## 2024-04-10 PROBLEM — R44.3 HALLUCINATION: Status: ACTIVE | Noted: 2024-04-10

## 2024-04-10 PROBLEM — Z85.828 HISTORY OF SKIN CANCER: Status: ACTIVE | Noted: 2024-04-10

## 2024-04-10 PROBLEM — R41.0 DELIRIUM: Status: ACTIVE | Noted: 2024-04-10

## 2024-04-10 LAB
ACINETOBACTER CALCOACETICUS/BAUMANNII COMPLEX: NOT DETECTED
ALBUMIN SERPL BCP-MCNC: 2.7 G/DL (ref 3.5–5.2)
ALP SERPL-CCNC: 84 U/L (ref 55–135)
ALT SERPL W/O P-5'-P-CCNC: 9 U/L (ref 10–44)
ANION GAP SERPL CALC-SCNC: 13 MMOL/L (ref 8–16)
AST SERPL-CCNC: 20 U/L (ref 10–40)
BACTEROIDES FRAGILIS: NOT DETECTED
BASOPHILS # BLD AUTO: 0.06 K/UL (ref 0–0.2)
BASOPHILS NFR BLD: 0.4 % (ref 0–1.9)
BILIRUB SERPL-MCNC: 0.3 MG/DL (ref 0.1–1)
BUN SERPL-MCNC: 32 MG/DL (ref 10–30)
CALCIUM SERPL-MCNC: 8.9 MG/DL (ref 8.7–10.5)
CANDIDA ALBICANS: NOT DETECTED
CANDIDA AURIS: NOT DETECTED
CANDIDA GLABRATA: NOT DETECTED
CANDIDA KRUSEI: NOT DETECTED
CANDIDA PARAPSILOSIS: NOT DETECTED
CANDIDA TROPICALIS: NOT DETECTED
CHLORIDE SERPL-SCNC: 100 MMOL/L (ref 95–110)
CO2 SERPL-SCNC: 19 MMOL/L (ref 23–29)
CREAT SERPL-MCNC: 0.8 MG/DL (ref 0.5–1.4)
CRYPTOCOCCUS NEOFORMANS/GATTII: NOT DETECTED
CTX-M GENE (ESBL PRODUCER): ABNORMAL
DIFFERENTIAL METHOD BLD: ABNORMAL
ENTEROBACTER CLOACAE COMPLEX: NOT DETECTED
ENTEROBACTERALES: NOT DETECTED
ENTEROCOCCUS FAECALIS: NOT DETECTED
ENTEROCOCCUS FAECIUM: NOT DETECTED
EOSINOPHIL # BLD AUTO: 0.1 K/UL (ref 0–0.5)
EOSINOPHIL NFR BLD: 0.5 % (ref 0–8)
ERYTHROCYTE [DISTWIDTH] IN BLOOD BY AUTOMATED COUNT: 14 % (ref 11.5–14.5)
ESCHERICHIA COLI: NOT DETECTED
EST. GFR  (NO RACE VARIABLE): >60 ML/MIN/1.73 M^2
GLUCOSE SERPL-MCNC: 51 MG/DL (ref 70–110)
HAEMOPHILUS INFLUENZAE: NOT DETECTED
HCT VFR BLD AUTO: 37 % (ref 37–48.5)
HGB BLD-MCNC: 11.8 G/DL (ref 12–16)
IMM GRANULOCYTES # BLD AUTO: 0.1 K/UL (ref 0–0.04)
IMM GRANULOCYTES NFR BLD AUTO: 0.7 % (ref 0–0.5)
IMP GENE (CARBAPENEM RESISTANT): ABNORMAL
KLEBSIELLA AEROGENES: NOT DETECTED
KLEBSIELLA OXYTOCA: NOT DETECTED
KLEBSIELLA PNEUMONIAE GROUP: NOT DETECTED
KPC RESISTANCE GENE (CARBAPENEM): ABNORMAL
LISTERIA MONOCYTOGENES: NOT DETECTED
LYMPHOCYTES # BLD AUTO: 2 K/UL (ref 1–4.8)
LYMPHOCYTES NFR BLD: 14.4 % (ref 18–48)
MAGNESIUM SERPL-MCNC: 2 MG/DL (ref 1.6–2.6)
MCH RBC QN AUTO: 31.5 PG (ref 27–31)
MCHC RBC AUTO-ENTMCNC: 31.9 G/DL (ref 32–36)
MCR-1: ABNORMAL
MCV RBC AUTO: 99 FL (ref 82–98)
MEC A/C AND MREJ (MRSA): ABNORMAL
MEC A/C: ABNORMAL
MONOCYTES # BLD AUTO: 1.1 K/UL (ref 0.3–1)
MONOCYTES NFR BLD: 7.8 % (ref 4–15)
NDM GENE (CARBAPENEM RESISTANT): ABNORMAL
NEISSERIA MENINGITIDIS: NOT DETECTED
NEUTROPHILS # BLD AUTO: 10.6 K/UL (ref 1.8–7.7)
NEUTROPHILS NFR BLD: 76.2 % (ref 38–73)
NRBC BLD-RTO: 0 /100 WBC
OXA-48-LIKE (CARBAPENEM RESISTANT): ABNORMAL
PHOSPHATE SERPL-MCNC: 4.1 MG/DL (ref 2.7–4.5)
PLATELET # BLD AUTO: 410 K/UL (ref 150–450)
PMV BLD AUTO: 9.7 FL (ref 9.2–12.9)
POCT GLUCOSE: 143 MG/DL (ref 70–110)
POCT GLUCOSE: 50 MG/DL (ref 70–110)
POCT GLUCOSE: 66 MG/DL (ref 70–110)
POTASSIUM SERPL-SCNC: 4.9 MMOL/L (ref 3.5–5.1)
PROT SERPL-MCNC: 5.9 G/DL (ref 6–8.4)
PROTEUS SPECIES: NOT DETECTED
PSEUDOMONAS AERUGINOSA: NOT DETECTED
RBC # BLD AUTO: 3.75 M/UL (ref 4–5.4)
SALMONELLA SP: NOT DETECTED
SERRATIA MARCESCENS: NOT DETECTED
SODIUM SERPL-SCNC: 132 MMOL/L (ref 136–145)
STAPHYLOCOCCUS AUREUS: NOT DETECTED
STAPHYLOCOCCUS EPIDERMIDIS: NOT DETECTED
STAPHYLOCOCCUS LUGDUNESIS: NOT DETECTED
STAPHYLOCOCCUS SPECIES: NOT DETECTED
STENOTROPHOMONAS MALTOPHILIA: NOT DETECTED
STREPTOCOCCUS AGALACTIAE: NOT DETECTED
STREPTOCOCCUS PNEUMONIAE: NOT DETECTED
STREPTOCOCCUS PYOGENES: NOT DETECTED
STREPTOCOCCUS SPECIES: DETECTED
VAN A/B (VRE GENE): ABNORMAL
VANCOMYCIN SERPL-MCNC: 9.1 UG/ML
VIM GENE (CARBAPENEM RESISTANT): ABNORMAL
WBC # BLD AUTO: 13.94 K/UL (ref 3.9–12.7)

## 2024-04-10 PROCEDURE — 25000003 PHARM REV CODE 250

## 2024-04-10 PROCEDURE — 63600175 PHARM REV CODE 636 W HCPCS

## 2024-04-10 PROCEDURE — 51798 US URINE CAPACITY MEASURE: CPT

## 2024-04-10 PROCEDURE — 87040 BLOOD CULTURE FOR BACTERIA: CPT | Mod: 59

## 2024-04-10 PROCEDURE — 36415 COLL VENOUS BLD VENIPUNCTURE: CPT

## 2024-04-10 PROCEDURE — 80202 ASSAY OF VANCOMYCIN: CPT | Performed by: STUDENT IN AN ORGANIZED HEALTH CARE EDUCATION/TRAINING PROGRAM

## 2024-04-10 PROCEDURE — 11000001 HC ACUTE MED/SURG PRIVATE ROOM

## 2024-04-10 PROCEDURE — 80053 COMPREHEN METABOLIC PANEL: CPT

## 2024-04-10 PROCEDURE — 63600175 PHARM REV CODE 636 W HCPCS: Performed by: STUDENT IN AN ORGANIZED HEALTH CARE EDUCATION/TRAINING PROGRAM

## 2024-04-10 PROCEDURE — 83735 ASSAY OF MAGNESIUM: CPT

## 2024-04-10 PROCEDURE — 25000003 PHARM REV CODE 250: Performed by: STUDENT IN AN ORGANIZED HEALTH CARE EDUCATION/TRAINING PROGRAM

## 2024-04-10 PROCEDURE — 85025 COMPLETE CBC W/AUTO DIFF WBC: CPT

## 2024-04-10 PROCEDURE — 84100 ASSAY OF PHOSPHORUS: CPT

## 2024-04-10 RX ORDER — SODIUM CHLORIDE, SODIUM LACTATE, POTASSIUM CHLORIDE, CALCIUM CHLORIDE 600; 310; 30; 20 MG/100ML; MG/100ML; MG/100ML; MG/100ML
INJECTION, SOLUTION INTRAVENOUS CONTINUOUS
Status: DISCONTINUED | OUTPATIENT
Start: 2024-04-10 | End: 2024-04-10

## 2024-04-10 RX ORDER — NIFEDIPINE 30 MG/1
30 TABLET, EXTENDED RELEASE ORAL DAILY
Status: DISCONTINUED | OUTPATIENT
Start: 2024-04-11 | End: 2024-04-12

## 2024-04-10 RX ORDER — SODIUM CHLORIDE 9 MG/ML
INJECTION, SOLUTION INTRAVENOUS CONTINUOUS
Status: ACTIVE | OUTPATIENT
Start: 2024-04-10 | End: 2024-04-11

## 2024-04-10 RX ADMIN — SODIUM CHLORIDE: 9 INJECTION, SOLUTION INTRAVENOUS at 04:04

## 2024-04-10 RX ADMIN — HEPARIN SODIUM 5000 UNITS: 5000 INJECTION INTRAVENOUS; SUBCUTANEOUS at 10:04

## 2024-04-10 RX ADMIN — HYDROCHLOROTHIAZIDE 25 MG: 25 TABLET ORAL at 09:04

## 2024-04-10 RX ADMIN — DEXTROSE MONOHYDRATE 1000 ML: 100 INJECTION, SOLUTION INTRAVENOUS at 08:04

## 2024-04-10 RX ADMIN — SODIUM CHLORIDE 500 ML: 9 INJECTION, SOLUTION INTRAVENOUS at 09:04

## 2024-04-10 RX ADMIN — VANCOMYCIN HYDROCHLORIDE 750 MG: 750 INJECTION, POWDER, LYOPHILIZED, FOR SOLUTION INTRAVENOUS at 12:04

## 2024-04-10 RX ADMIN — HEPARIN SODIUM 5000 UNITS: 5000 INJECTION INTRAVENOUS; SUBCUTANEOUS at 05:04

## 2024-04-10 RX ADMIN — LISINOPRIL 40 MG: 20 TABLET ORAL at 09:04

## 2024-04-10 RX ADMIN — METOPROLOL SUCCINATE 25 MG: 25 TABLET, EXTENDED RELEASE ORAL at 09:04

## 2024-04-10 RX ADMIN — POLYETHYLENE GLYCOL 3350 17 G: 17 POWDER, FOR SOLUTION ORAL at 09:04

## 2024-04-10 RX ADMIN — CEFTRIAXONE 2 G: 2 INJECTION, POWDER, FOR SOLUTION INTRAMUSCULAR; INTRAVENOUS at 05:04

## 2024-04-10 RX ADMIN — HEPARIN SODIUM 5000 UNITS: 5000 INJECTION INTRAVENOUS; SUBCUTANEOUS at 01:04

## 2024-04-10 RX ADMIN — ACETAMINOPHEN 1000 MG: 500 TABLET ORAL at 05:04

## 2024-04-10 NOTE — ACP (ADVANCE CARE PLANNING)
Advance Care Planning     Date: 04/10/2024    [facility]  I engaged the family in a voluntary conversation about advance care planning and we specifically addressed what the goals of care would be moving forward, in light of the patient's change in clinical status, specifically progressive dementia and disability with recurrent falls.  We did specifically address the patient's likely prognosis, which is poor.  We explored the patient's values and preferences for future care.  The family endorses that what is most important right now is to focus on remaining as independent as possible    Accordingly, we have decided that the best plan to meet the patient's goals includes continuing with treatment    I did explain the role for hospice care at this stage of the patient's illness, including its ability to help the patient live with the best quality of life possible.  We will not be making a hospice referral.    I spent a total of 30 minutes engaging the patient in this advance care planning discussion.         I spoke to patient's son, Jono Aponte, at length.  He feels that his mother would like to focus on remaining as independent as possible.  Does not believe that she would benefit from end of life care at this time.  Not interested in a comfort focus.  He is interested in services to help her maintain her mobility.

## 2024-04-10 NOTE — ASSESSMENT & PLAN NOTE
Recent Labs   Lab 04/10/24  0725   *     Daily BMP  Level 6 diet ordered per SLP. Thin liquids, but no straws.  500cc NS bolus

## 2024-04-10 NOTE — NURSING
@2100 Pt. agitated, restless, non-redirectable, and trying to get out of the bed. Team notified and Haloperidol administered as ordered.

## 2024-04-10 NOTE — PLAN OF CARE
Problem: Adult Inpatient Plan of Care  Goal: Patient-Specific Goal (Individualized)  Description: Pt will maintain sbp below 180  Outcome: Ongoing, Progressing  Flowsheets (Taken 4/10/2024 0314)  Anxieties, Fears or Concerns: none  Individualized Care Needs: care clustered     Problem: Skin Injury Risk Increased  Goal: Skin Health and Integrity  Outcome: Ongoing, Progressing  Intervention: Optimize Skin Protection  Flowsheets (Taken 4/10/2024 0314)  Pressure Reduction Techniques:   frequent weight shift encouraged   heels elevated off bed   positioned off wounds   pressure points protected   weight shift assistance provided  Pressure Reduction Devices: pressure-redistributing mattress utilized  Skin Protection:   adhesive use limited   skin-to-device areas padded   tubing/devices free from skin contact   skin-to-skin areas padded  Head of Bed (HOB) Positioning: HOB at 30-45 degrees     VSS. Bed in lowest position, side rails x 3, call light in use.

## 2024-04-10 NOTE — PROGRESS NOTES
"Pharmacokinetic Initial Assessment: IV Vancomycin    Assessment/Plan:    Patient was given a 1 g (~ 20 mg/kg) IV vancomycin dose 4/9 AM  Given patients age, will obtain a vancomycin random level before scheduling a regimen  Goal 15-20 empirically x bacteremia  Pharmacy will continue to follow and monitor vancomycin     Please contact pharmacy at extension [phone] with any questions regarding this assessment.     Thank you for the consult,   Сергей Bowman, PharmD, [facility]      Patient brief summary:  Jayne Aponte is a 89 y.o. female initiated on antimicrobial therapy with IV Vancomycin for treatment of suspected bacteremia    Drug Allergies:   Review of patient's allergies indicates:  No Known Allergies    Actual Body Weight:   54.4 kg    Renal Function:   Estimated Creatinine Clearance: 29.7 mL/min (based on SCr of 0.8 mg/dL).,     Dialysis Method (if applicable):  N/A    CBC (last 72 hours):  Recent Labs   Lab Result Units 04/09/24  0031 04/10/24  0725   WBC K/uL 15.94* 13.94*   Hemoglobin g/dL 12.0 11.8*   Hematocrit % 36.5* 37.0   Platelets K/uL 417 410   Gran % % 81.1* 76.2*   Lymph % % 9.7* 14.4*   Mono % % 8.0 7.8   Eosinophil % % 0.2 0.5   Basophil % % 0.3 0.4   Differential Method  Automated Automated       Metabolic Panel (last 72 hours):  Recent Labs   Lab Result Units 04/09/24  0031 04/09/24  0301 04/10/24  0725   Sodium mmol/L 134*  --  132*   Potassium mmol/L 4.5  --  4.9   Chloride mmol/L 101  --  100   CO2 mmol/L 21*  --  19*   Glucose mg/dL 106  --  51*   Glucose, UA   --  Negative  --    BUN mg/dL 30  --  32*   Creatinine mg/dL 1.1  --  0.8   Albumin g/dL 3.0*  --  2.7*   Total Bilirubin mg/dL 0.4  --  0.3   Alkaline Phosphatase U/L 92  --  84   AST U/L 19  --  20   ALT U/L 11  --  9*   Magnesium mg/dL  --   --  2.0   Phosphorus mg/dL  --   --  4.1       Drug levels (last 3 results):  No results for input(s): "VANCOMYCINRA", "VANCORANDOM", "VANCOMYCINPE", "VANCOPEAK", "VANCOMYCINTR", "VANCOTROUGH" " in the last 72 hours.    Microbiologic Results:  Microbiology Results (last 7 days)       Procedure Component Value Units Date/Time    Blood culture [[id number]]     Order Status: Sent Specimen: Blood     Blood culture [[id number]]     Order Status: Sent Specimen: Blood     Blood culture [[id number]] Collected: 04/09/24 0504    Order Status: Completed Specimen: Blood from Peripheral, Antecubital, Left Updated: 04/10/24 0921     Blood Culture, Routine Gram stain karthik bottle: Gram positive cocci in chains resembling Strep      Results called to and read back by: Gema Dodge RN 04/10/2024  09:21    Urine culture [[id number]]  (Abnormal) Collected: 04/09/24 0301    Order Status: Completed Specimen: Urine Updated: 04/10/24 0916     Urine Culture, Routine PRESUMPTIVE E COLI  >100,000 cfu/ml  Identification and susceptibility pending      Narrative:      Specimen Source->Urine    Rapid Organism ID by PCR (from Blood culture) [[id number]] Collected: 04/09/24 0504    Order Status: No result Updated: 04/10/24 0905    Blood culture [[id number]] Collected: 04/09/24 0504    Order Status: Completed Specimen: Blood from Peripheral, Antecubital, Right Updated: 04/10/24 0613     Blood Culture, Routine No Growth to date      No Growth to date

## 2024-04-10 NOTE — NURSING
Patient more alert today. Feeding self to the best of her ability. Patient states she can't see well due to macular degeneration. Patient also had intelligible conversation on the phone with her daughter in law.

## 2024-04-10 NOTE — PROGRESS NOTES
[facility] Vargas - Neurosurgery ([facility])  [facility] Medicine  Progress Note    Patient Name: Jayne Aponte  MRN: [id number]  Patient Class: IP- Inpatient   Admission Date: 4/8/2024  Length of Stay: 1 days  Attending Physician: Ronn Becerra MD  Primary Care Provider: KHANH Corea MD        Subjective:     Principal Problem:Encephalopathy, metabolic        HPI:  89 yof with pmh on care everywhere of COPD, HTN, CKD3, colon cancer 2003, breast cancer 2006, skin cancer, dementia, functional decline, malnutrition presenting with worsening AMS and two falls at her nursing home. Pt had difficulty telling me why she was there. From discussion with ED staff from the nursing home pt has been confused and hallucinating for 2 days now, no known fevers. Fall was not witnessed but does not think she LOC. Pt does not know how she fell. Pt asked for how she was going to get a taxi during interview. Pt's paper work has that patient is a DNR    In the ED: CT head/neck negative for bleeds or fracture, glucose was WNL, Urine was positive for UTI. No prior resistant cultures, but higher risk coming from nursing home.     Overview/Hospital Course:  Ms. Aponte is a 89-year-old female with dementia and reported functional decline who presented with altered mental status and two falls at her nursing home resulting in a right head laceration. Upon admission, she was found to have a UTI and was started on IV ceftriaxone. Due to her falls and altered mentation she receive a CT Head which showed no acute intracranial processes and CT cervical spine which showed no fractures. She was also hypertensive on arrival, and was given lisinopril, HCTZ, and metoprolol succinate.           Interval History: Patient was very agitated overnight, and was frequently trying to get out of her bed.  Video monitoring started, and patient was given 2 mg of haloperidol.  Patient was very somnolent on morning exam after receiving the medication, and was only  oriented to her name.  Blood pressure control has been better with systolic blood pressure ranging from 117-150s.  Blood cultures showing no growth to date, urine culture still with no results. Labs showed hypoglycemia at 51 this morning, given IV dextrose and placed Level 6 diet per SLP. Na decreased from 134 to 132. Patient noted to have minimal urine output overnight, so will give some IV fluids.     Objective:     Vital Signs (Most Recent):  Temp: 97 °F (36.1 °C) (04/10/24 0734)  Pulse: 69 (04/10/24 0517)  Resp: 18 (04/10/24 0517)  BP: 130/62 (04/10/24 0734)  SpO2: 98 % (04/10/24 0517) Vital Signs (24h Range):  Temp:  [97 °F (36.1 °C)-97.7 °F (36.5 °C)] 97 °F (36.1 °C)  Pulse:  [69-96] 69  Resp:  [16-26] 18  SpO2:  [95 %-99 %] 98 %  BP: (117-186)/(57-94) 130/62     Weight: 54.4 kg (120 lb)  Body mass index is 19.97 kg/m².    Intake/Output Summary (Last 24 hours) at 4/10/2024 0800  Last data filed at 4/10/2024 0500  Gross per 24 hour   Intake 75 ml   Output 150 ml   Net -75 ml         Physical Exam  Vitals and nursing note reviewed.   Constitutional:       Appearance: She is ill-appearing.   HENT:      Head: Normocephalic. Laceration present.      Comments: Large scalp wound, sutured up on R side of head.      Nose: No congestion or rhinorrhea.      Mouth/Throat:      Mouth: Mucous membranes are dry.      Pharynx: Oropharynx is clear.   Eyes:      General: No scleral icterus.     Extraocular Movements: Extraocular movements intact.   Cardiovascular:      Rate and Rhythm: Normal rate and regular rhythm.   Pulmonary:      Effort: Pulmonary effort is normal. No respiratory distress.      Breath sounds: No wheezing.   Abdominal:      General: Abdomen is flat. There is no distension.      Palpations: Abdomen is soft.      Tenderness: There is no abdominal tenderness.   Musculoskeletal:      Cervical back: Pain with movement and muscular tenderness present.      Right lower leg: No edema.      Left lower leg: No edema.       Comments: Moves all extremities, BLE weakness, difficult following muscle testing   Skin:     General: Skin is warm and dry.      Coloration: Skin is pale.      Findings: Laceration present.      Comments: Laceration on R side of scalp and L tib/fib lesion wrapped up and bandage   Neurological:      Mental Status: She is alert. She is disoriented.      Motor: Weakness and atrophy present.      Comments: Oriented to name, situation, and place. Stated year was 1924, but knew Krystal was current president.              Significant Labs: All pertinent labs within the past 24 hours have been reviewed.    Significant Imaging: I have reviewed all pertinent imaging results/findings within the past 24 hours.    Assessment/Plan:      * Encephalopathy, metabolic  89 F presenting from nursing facility with altered mental status and and 2 recent falls. Nursing facility reported recent visual hallucinations and delusions. In the emergency department she was afebrile. Found to have serum leukocytosis to 15.9 K with neutrophil predominance. Urinalysis with greater than 100 wbc's per HPF. Patient given doses of vancomycin and ceftriaxone in the ED.    Ceftriaxone 2g daily  Fall precautions  Video monitoring  Delirium precautions, 8am lab draws      Delirium  Likely secondary to age, baseline dementia, and UTI. See UTI (urinary tract infection) and Encephalopathy, metabolic       Hallucination  Likely secondary to UTI. See UTI (urinary tract infection) and Encephalopathy, metabolic     History of skin cancer  89F with history of basal cell carcinoma.       Hyponatremia    Recent Labs   Lab 04/10/24  0725   *     Daily BMP  Level 6 diet ordered per SLP. Thin liquids, but no straws.  500cc NS bolus     UTI (urinary tract infection)  Pt presenting with confusion, leukocytosis, UA was consistent with a urinary tract infection. Pt chart does not have any resistant cultures, and patient is unaware of any. With her at a nursing  home there is risk for moire resistant bugs, but vital signs have been stable. Lactic acid was negative    Plan  Ceftriaxone 2g daily, vancomycin discontinued  Follow blood cultures. NGTD  Follow urine culture. No result currently      HTN (hypertension)  Chronic, uncontrolled. Latest blood pressure and vitals reviewed-     Temp:  [97 °F (36.1 °C)-97.7 °F (36.5 °C)]   Pulse:  [69-96]   Resp:  [16-26]   BP: (117-186)/(57-94)   SpO2:  [95 %-99 %] .   Home meds for hypertension were reviewed and noted below.   Hypertension Medications               lisinopriL (PRINIVIL,ZESTRIL) 40 MG tablet Take 40 mg by mouth once daily.    metoprolol succinate (TOPROL-XL) 25 MG 24 hr tablet Take 25 mg by mouth once daily.            While in the hospital, will manage blood pressure as follows; Continue home antihypertensive regimen  Will utilize p.r.n. blood pressure medication only if patient's blood pressure greater than 180/110 and she develops symptoms such as worsening chest pain or shortness of breath.  Lisinopril 40mg  Metoprolol 25mg BID (nebivolol not on formulary)  Add HCTZ 25mg for improved control      Supraventricular tachycardia  Difficult to find her records, but from care everywhere patient was recently taking metoprolol 25mg BID, but was recently transitioned to nebivolol 5mg for SVT.    Metoprolol 25mg BID      Stage 3 chronic kidney disease  Creatine stable for now. BMP reviewed- noted Estimated Creatinine Clearance: 29.7 mL/min (based on SCr of 0.8 mg/dL). according to latest data. Based on current GFR, CKD stage is stage 3 - GFR 30-59.  Monitor UOP and serial BMP and adjust therapy as needed. Renally dose meds. Avoid nephrotoxic medications and procedures.        Hyperlipidemia        Dementia  Pt has some baseline dementia, but for the last few days has been experiencing worsening symptoms of hallucinations and worsening confusion. Pt found to have a UTI. Most likely causing the worsening confusion      VTE Risk  Mitigation (From admission, onward)           Ordered     heparin (porcine) injection 5,000 Units  Every 8 hours         04/09/24 1134     Reason for No Pharmacological VTE Prophylaxis  Once        Question:  Reasons:  Answer:  Active Bleeding    04/09/24 0500     IP VTE HIGH RISK PATIENT  Once         04/09/24 0500     Place sequential compression device  Until discontinued         04/09/24 0500                    Discharge Planning   ASHLEY:      Code Status: DNR   Is the patient medically ready for discharge?: No    Reason for patient still in hospital (select all that apply): Treatment  Discharge Plan A: Return to nursing home            Daniel Lopez MD  Department of Hospital Medicine   [facility] - Neurosurgery ([facility])

## 2024-04-10 NOTE — ASSESSMENT & PLAN NOTE
Chronic, uncontrolled. Latest blood pressure and vitals reviewed-     Temp:  [97 °F (36.1 °C)-97.7 °F (36.5 °C)]   Pulse:  [69-96]   Resp:  [16-26]   BP: (117-186)/(57-94)   SpO2:  [95 %-99 %] .   Home meds for hypertension were reviewed and noted below.   Hypertension Medications               lisinopriL (PRINIVIL,ZESTRIL) 40 MG tablet Take 40 mg by mouth once daily.    metoprolol succinate (TOPROL-XL) 25 MG 24 hr tablet Take 25 mg by mouth once daily.            While in the hospital, will manage blood pressure as follows; Continue home antihypertensive regimen  Will utilize p.r.n. blood pressure medication only if patient's blood pressure greater than 180/110 and she develops symptoms such as worsening chest pain or shortness of breath.  Lisinopril 40mg  Metoprolol 25mg BID (nebivolol not on formulary)  Add HCTZ 25mg for improved control

## 2024-04-10 NOTE — ASSESSMENT & PLAN NOTE
Creatine stable for now. BMP reviewed- noted Estimated Creatinine Clearance: 29.7 mL/min (based on SCr of 0.8 mg/dL). according to latest data. Based on current GFR, CKD stage is stage 3 - GFR 30-59.  Monitor UOP and serial BMP and adjust therapy as needed. Renally dose meds. Avoid nephrotoxic medications and procedures.

## 2024-04-10 NOTE — ASSESSMENT & PLAN NOTE
Pt presenting with confusion, leukocytosis, UA was consistent with a urinary tract infection. Pt chart does not have any resistant cultures, and patient is unaware of any. With her at a nursing home there is risk for moire resistant bugs, but vital signs have been stable. Lactic acid was negative    Plan  Ceftriaxone 2g daily, vancomycin discontinued  Follow blood cultures. NGTD  Follow urine culture. No result currently

## 2024-04-10 NOTE — ASSESSMENT & PLAN NOTE
89 F presenting from nursing facility with altered mental status and and 2 recent falls. Nursing facility reported recent visual hallucinations and delusions. In the emergency department she was afebrile. Found to have serum leukocytosis to 15.9 K with neutrophil predominance. Urinalysis with greater than 100 wbc's per HPF. Patient given doses of vancomycin and ceftriaxone in the ED.    Ceftriaxone 2g daily  Fall precautions  Video monitoring  Delirium precautions, 8am lab draws

## 2024-04-10 NOTE — CONSULTS
[facility]irit Vargas - Neurosurgery ([facility])  Wound Care    Patient Name:  Jayne Aponte   MRN:  [id number]  Date: 4/10/2024  Diagnosis: Encephalopathy, metabolic    History:     Past Medical History:   Diagnosis Date    Cancer     Hypertension        Social History     Socioeconomic History    Marital status:    Tobacco Use    Smoking status: Never    Smokeless tobacco: Never   Substance and Sexual Activity    Alcohol use: No     Social Determinants of Health     Financial Resource Strain: Patient Unable To Answer (4/9/2024)    Overall Financial Resource Strain (CARDIA)     Difficulty of Paying Living Expenses: Patient unable to answer   Food Insecurity: Patient Unable To Answer (4/9/2024)    Hunger Vital Sign     Worried About Running Out of Food in the Last Year: Patient unable to answer     Ran Out of Food in the Last Year: Patient unable to answer   Transportation Needs: Patient Unable To Answer (4/9/2024)    PRAPARE - Transportation     Lack of Transportation (Medical): Patient unable to answer     Lack of Transportation (Non-Medical): Patient unable to answer   Physical Activity: Patient Unable To Answer (4/9/2024)    Exercise Vital Sign     Days of Exercise per Week: Patient unable to answer     Minutes of Exercise per Session: Patient unable to answer   Stress: Patient Unable To Answer (4/9/2024)    [employer] - Occupational Stress Questionnaire     Feeling of Stress : Patient unable to answer   Social Connections: Patient Unable To Answer (4/9/2024)    Social Connection and Isolation Panel [NHANES]     Frequency of Communication with Friends and Family: Patient unable to answer     Frequency of Social Gatherings with Friends and Family: Patient unable to answer     Attends Shinto Services: Patient unable to answer     Active Member of Clubs or Organizations: Patient unable to answer     Attends Club or Organization Meetings: Patient unable to answer     Marital Status: Patient  unable to answer   Housing Stability: Patient Unable To Answer (4/9/2024)    Housing Stability Vital Sign     Unable to Pay for Housing in the Last Year: Patient unable to answer     Unstable Housing in the Last Year: Patient unable to answer       Precautions:     Allergies as of 04/08/2024    (No Known Allergies)       [facility] Assessment Details/Treatment   Patient seen for wound care consultation to right hand and right index finger.    Reviewed chart for this encounter.   See Flow Sheet for findings.      Per chart review. Ms. Aponte is a 89-year-old lady with dementia, recent functional decline, malnutrition, hypertension, CKD 3, COPD, history of colon cancer in 2003, history of breast cancer 2006. Wound care noted intact dressing to right second finer. Wound care cleansed with normal saline before applying a Mepilex for protection. Pt lying on Waffle mattress and Primary RN told wound care sacrum check was done and that sacrum was clear. Pt denied any needs at this time.       RECOMMENDATIONS:  - Bedside nurse to perform wound care to right second finger:  Cleanse wound with normal saline  Pat dry.  Cover with a foam border (Mepilex)   Apply this Weekly.    Recommendations made to primary team for above plan via secured chat. Wound Care to follow up. Orders placed.       Nursing to continue care.  Nursing to maintain pressure injury prevention interventions.  Contact wound care for any further questions.         04/10/24 1010   WOCN Assessment   WOCN Total Time (mins) 30   Visit Date 04/10/24   Visit Time 1010   Consult Type New   WOCN Speciality Wound   Wound skin tear   Intervention assessed;changed;applied;chart review;orders   Teaching on-going        Altered Skin Integrity 04/10/24 0230 Right dorsal Finger, second   Date First Assessed/Time First Assessed: 04/10/24 0230   Side: Right  Orientation: dorsal  Location: Finger, second   Wound Image    Dressing Appearance Dry;Intact;Dried drainage   Drainage Amount  Small   Drainage Characteristics/Odor Serosanguineous   Appearance Pink;Red;Moist   Tissue loss description Partial thickness   Care Cleansed with:;Sterile normal saline   Dressing Applied;Foam   Dressing Change Due 04/17/24     04/10/2024

## 2024-04-10 NOTE — HOSPITAL COURSE
Ms. Aponte is a 89-year-old female with dementia and reported functional decline who presented with altered mental status and two falls at her nursing home resulting in a right head laceration. Due to her falls and altered mentation she receive a CT Head which showed no acute intracranial processes and CT cervical spine which showed no fractures. She was also hypertensive on arrival, and was given lisinopril, HCTZ, and metoprolol succinate. Upon admission, she was found to have an E. Coli UTI and was started on IV ceftriaxone. Her blood cultures were positive for streptococcus and she was started on vancomycin while awaiting for sensitivity data. Blood cultures grew strep gordonii and strep mitis which were presumed to be contaminants. During her stay, she developed mild hyponatremia, so HCTZ was discontinued.  We suspect some of her hyponatremia was due to lingering effect of hydrochlorothiazide, possibly from her, but her sodium decreased when she was not given IV fluids, lower suspicion for SIADH, and sodium increased with gentle fluid administration. Her mentation improved while being treated with antibiotics. She was noted to have a mild macrocytic anemia, so B12 and folate levels were ordered but were found to be within normal limits.  After her repeat blood cultures were negative for 48 hours, and the urine culture sensitivities resulted, the patient was transitioned to oral Augmentin after receiving 4 days of IV ceftriaxone. Discharge to her nursing home was delayed over the weekend by her facility. She experienced neck tenderness and headache on 4/14-4/15 and given history of traumatic fall with a forehead laceration, repeat CT head was ordered to rule out possible subdural hematoma, but no acute intracranial abnormalities were noted. Due to the prolonged stay, occupational therapy changed recommendations from no therapies required to recommending low intensity therapy 3x/wk which she will get at her  nursing home. She was stable for discharge with hospital follow up scheduled for 4/19/24.

## 2024-04-10 NOTE — SUBJECTIVE & OBJECTIVE
Interval History: Patient was very agitated overnight, and was frequently trying to get out of her bed.  Video monitoring started, and patient was given 2 mg of haloperidol.  Patient was very somnolent on morning exam after receiving the medication, and was only oriented to her name.  Blood pressure control has been better with systolic blood pressure ranging from 117-150s.  Blood cultures showing no growth to date, urine culture still with no results. Labs showed hypoglycemia at 51 this morning, given IV dextrose and placed Level 6 diet per SLP. Na decreased from 134 to 132. Patient noted to have minimal urine output overnight, so will give some IV fluids.     Objective:     Vital Signs (Most Recent):  Temp: 97 °F (36.1 °C) (04/10/24 0734)  Pulse: 69 (04/10/24 0517)  Resp: 18 (04/10/24 0517)  BP: 130/62 (04/10/24 0734)  SpO2: 98 % (04/10/24 0517) Vital Signs (24h Range):  Temp:  [97 °F (36.1 °C)-97.7 °F (36.5 °C)] 97 °F (36.1 °C)  Pulse:  [69-96] 69  Resp:  [16-26] 18  SpO2:  [95 %-99 %] 98 %  BP: (117-186)/(57-94) 130/62     Weight: 54.4 kg (120 lb)  Body mass index is 19.97 kg/m².    Intake/Output Summary (Last 24 hours) at 4/10/2024 0800  Last data filed at 4/10/2024 0500  Gross per 24 hour   Intake 75 ml   Output 150 ml   Net -75 ml         Physical Exam  Vitals and nursing note reviewed.   Constitutional:       Appearance: She is ill-appearing.   HENT:      Head: Normocephalic. Laceration present.      Comments: Large scalp wound, sutured up on R side of head.      Nose: No congestion or rhinorrhea.      Mouth/Throat:      Mouth: Mucous membranes are dry.      Pharynx: Oropharynx is clear.   Eyes:      General: No scleral icterus.     Extraocular Movements: Extraocular movements intact.   Cardiovascular:      Rate and Rhythm: Normal rate and regular rhythm.   Pulmonary:      Effort: Pulmonary effort is normal. No respiratory distress.      Breath sounds: No wheezing.   Abdominal:      General: Abdomen is flat.  There is no distension.      Palpations: Abdomen is soft.      Tenderness: There is no abdominal tenderness.   Musculoskeletal:      Cervical back: Pain with movement and muscular tenderness present.      Right lower leg: No edema.      Left lower leg: No edema.      Comments: Moves all extremities, BLE weakness, difficult following muscle testing   Skin:     General: Skin is warm and dry.      Coloration: Skin is pale.      Findings: Laceration present.      Comments: Laceration on R side of scalp and L tib/fib lesion wrapped up and bandage   Neurological:      Mental Status: She is alert. She is disoriented.      Motor: Weakness and atrophy present.      Comments: Oriented to name, situation, and place. Stated year was 1924, but sonia Rice was current president.              Significant Labs: All pertinent labs within the past 24 hours have been reviewed.    Significant Imaging: I have reviewed all pertinent imaging results/findings within the past 24 hours.

## 2024-04-10 NOTE — NURSING
Nurses Note -- 4 Eyes      4/10/2024   2:44 AM      Skin assessed during: Daily Assessment      [] No Altered Skin Integrity Present    []Prevention Measures Documented      [x] Yes- Altered Skin Integrity Present or Discovered   [x] LDA Added if Not in Epic (Describe Wound)   [] New Altered Skin Integrity was Present on Admit and Documented in LDA   [] Wound Image Taken    Wound Care Consulted? Yes    Attending Nurse:  Monalisa Kruse RN/Staff Member:   Yolis

## 2024-04-10 NOTE — PROGRESS NOTES
Pharmacokinetic Assessment Follow Up: IV Vancomycin    Vancomycin Regimen Plan:    The patients random level resulted as 9.1 after a 1 g IV vancomycin dose 4/9 AM  Plan to pulse dose patient for now  Will give 750 mg IV vancomycin today and follow up with another random scheduled w/ 4/11 AM labs    Drug levels (last 3 results):  Recent Labs   Lab Result Units 04/10/24  1011   Vancomycin, Random ug/mL 9.1     Pharmacy will continue to follow and monitor vancomycin.    Please contact pharmacy at extension [phone] for questions regarding this assessment.    Thank you for the consult,   Сергей Bowman, PharmD, [facility]      Patient brief summary:  Jayne Aponte is a 89 y.o. female initiated on antimicrobial therapy with IV Vancomycin for treatment of bacteremia    Drug Allergies:   Review of patient's allergies indicates:  No Known Allergies    Actual Body Weight:   54.4 kg    Renal Function:   Estimated Creatinine Clearance: 29.7 mL/min (based on SCr of 0.8 mg/dL).,     Dialysis Method (if applicable):  N/A    CBC (last 72 hours):  Recent Labs   Lab Result Units 04/09/24  0031 04/10/24  0725   WBC K/uL 15.94* 13.94*   Hemoglobin g/dL 12.0 11.8*   Hematocrit % 36.5* 37.0   Platelets K/uL 417 410   Gran % % 81.1* 76.2*   Lymph % % 9.7* 14.4*   Mono % % 8.0 7.8   Eosinophil % % 0.2 0.5   Basophil % % 0.3 0.4   Differential Method  Automated Automated       Metabolic Panel (last 72 hours):  Recent Labs   Lab Result Units 04/09/24  0031 04/09/24  0301 04/10/24  0725   Sodium mmol/L 134*  --  132*   Potassium mmol/L 4.5  --  4.9   Chloride mmol/L 101  --  100   CO2 mmol/L 21*  --  19*   Glucose mg/dL 106  --  51*   Glucose, UA   --  Negative  --    BUN mg/dL 30  --  32*   Creatinine mg/dL 1.1  --  0.8   Albumin g/dL 3.0*  --  2.7*   Total Bilirubin mg/dL 0.4  --  0.3   Alkaline Phosphatase U/L 92  --  84   AST U/L 19  --  20   ALT U/L 11  --  9*   Magnesium mg/dL  --   --  2.0   Phosphorus mg/dL  --   --  4.1       Vancomycin  Administrations:  vancomycin given in the last 96 hours                     vancomycin (VANCOCIN) 1,000 mg in dextrose 5 % (D5W) 250 mL IVPB (Vial-Mate) (mg) 1,000 mg New Bag 04/09/24 0605                    Microbiologic Results:  Microbiology Results (last 7 days)       Procedure Component Value Units Date/Time    Rapid Organism ID by PCR (from Blood culture) [[id number]]  (Abnormal) Collected: 04/09/24 0504    Order Status: Completed Updated: 04/10/24 1038     Enterococcus faecalis Not Detected     Enterococcus faecium Not Detected     Listeria monocytogenes Not Detected     Staphylococcus spp. Not Detected     Staphylococcus aureus Not Detected     Staphylococcus epidermidis Not Detected     Staphylococcus lugdunensis Not Detected     Streptococcus species Detected     Streptococcus agalactiae Not Detected     Streptococcus pneumoniae Not Detected     Streptococcus pyogenes Not Detected     Acinetobacter calcoaceticus/baumannii complex Not Detected     Bacteroides fragilis Not Detected     Enterobacterales Not Detected     Enterobacter cloacae complex Not Detected     Escherichia coli Not Detected     Klebsiella aerogenes Not Detected     Klebsiella oxytoca Not Detected     Klebsiella pneumoniae group Not Detected     Proteus Not Detected     Salmonella sp Not Detected     Serratia marcescens Not Detected     Haemophilus influenzae Not Detected     Neisseria meningtidis Not Detected     Pseudomonas aeruginosa Not Detected     Stenotrophomonas maltophilia Not Detected     Candida albicans Not Detected     Candida auris Not Detected     Candida glabrata Not Detected     Candida krusei Not Detected     Candida parapsilosis Not Detected     Candida tropicalis Not Detected     Cryptococcus neoformans/gattii Not Detected     CTX-M (ESBL ) Test Not Applicable     IMP (Carbapenem resistant) Test Not Applicable     KPC resistance gene (Carbapenem resistant) Test Not Applicable     mcr-1  Test Not Applicable      mec A/C  Test Not Applicable     mec A/C and MREJ (MRSA) gene Test Not Applicable     NDM (Carbapenem resistant) Test Not Applicable     OXA-48-like (Carbapenem resistant) Test Not Applicable     van A/B (VRE gene) Test Not Applicable     VIM (Carbapenem resistant) Test Not Applicable    Blood culture [[id number]] Collected: 04/10/24 1006    Order Status: Sent Specimen: Blood Updated: 04/10/24 1033    Blood culture [[id number]] Collected: 04/10/24 1011    Order Status: Sent Specimen: Blood from Peripheral, Hand, Right Updated: 04/10/24 1033    Blood culture [[id number]] Collected: 04/09/24 0504    Order Status: Completed Specimen: Blood from Peripheral, Antecubital, Left Updated: 04/10/24 0921     Blood Culture, Routine Gram stain karthik bottle: Gram positive cocci in chains resembling Strep      Results called to and read back by: Gema Dodge RN 04/10/2024  09:21    Urine culture [[id number]]  (Abnormal) Collected: 04/09/24 0301    Order Status: Completed Specimen: Urine Updated: 04/10/24 0916     Urine Culture, Routine PRESUMPTIVE E COLI  >100,000 cfu/ml  Identification and susceptibility pending      Narrative:      Specimen Source->Urine    Blood culture [[id number]] Collected: 04/09/24 0504    Order Status: Completed Specimen: Blood from Peripheral, Antecubital, Right Updated: 04/10/24 0613     Blood Culture, Routine No Growth to date      No Growth to date

## 2024-04-10 NOTE — NURSING
Nurses Note -- 4 Eyes      4/10/2024   8:00 AM      Skin assessed during: Q Shift Change      [] No Altered Skin Integrity Present    []Prevention Measures Documented      [x] Yes- Altered Skin Integrity Present or Discovered   [] LDA Added if Not in Epic (Describe Wound)   [] New Altered Skin Integrity was Present on Admit and Documented in LDA   [] Wound Image Taken    Wound Care Consulted? Yes    Attending Nurse:  Gema Kruse RN/Staff Member:  Philly Barrera mattress applied, Head wound sutured bruising open to air, left calf laceration/sutures steri strips, wrapped,

## 2024-04-10 NOTE — CARE UPDATE
I have reviewed the chart of Jayne Aponte who is hospitalized for the following:    Active Hospital Problems    Diagnosis    Encephalopathy, metabolic     presented  from her nursing facility with altered mental status and  recent falls. Nursing facility reported recent visual hallucinations and delusions. In the emergency department she was afebrile.   Found to have serum leukocytosis to 15.9 K with neutrophil predominance.   Urinalysis with greater than 100 wbc's per HPF.   Patient given doses of vancomycin and ceftriaxone       History of skin cancer    Hallucination     Likely due to UTI      Delirium     Confused likely due to UTI      HTN (hypertension)    UTI (urinary tract infection)    Hyponatremia     POA, Na 134  BMP daily       Dementia    Supraventricular tachycardia    Stage 3 chronic kidney disease        Lauren Rodas NP  [facility]

## 2024-04-10 NOTE — PLAN OF CARE
Pt repositioned for comfort, waffle mattress and mepilex applied for skin integrity and safety. Wound care consult for ongoing care. A&O x3. Conversational and able to eat x 1 assist. Dextrose bolus, normal saline bolus, and meds administered per MAR. Pt remained safe throughout shift., compliant with fall risk measures. Bed locked and in lowest position, bed alarm on, call bell and personal belongings within reach, safety equipment at bedside.     -Gema Millan     Problem: Adult Inpatient Plan of Care  Goal: Plan of Care Review  Outcome: Ongoing, Progressing  Goal: Patient-Specific Goal (Individualized)  Description: Pt will maintain sbp below 180  Outcome: Ongoing, Progressing  Goal: Absence of Hospital-Acquired Illness or Injury  Outcome: Ongoing, Progressing  Goal: Optimal Comfort and Wellbeing  Outcome: Ongoing, Progressing  Goal: Readiness for Transition of Care  Outcome: Ongoing, Progressing     Problem: Impaired Wound Healing  Goal: Optimal Wound Healing  Outcome: Ongoing, Progressing     Problem: Skin Injury Risk Increased  Goal: Skin Health and Integrity  Outcome: Ongoing, Progressing     Problem: Fall Injury Risk  Goal: Absence of Fall and Fall-Related Injury  Outcome: Ongoing, Progressing

## 2024-04-10 NOTE — ASSESSMENT & PLAN NOTE
Difficult to find her records, but from care everywhere patient was recently taking metoprolol 25mg BID, but was recently transitioned to nebivolol 5mg for SVT.    Metoprolol 25mg BID

## 2024-04-10 NOTE — ASSESSMENT & PLAN NOTE
Likely secondary to age, baseline dementia, and UTI. See UTI (urinary tract infection) and Encephalopathy, metabolic

## 2024-04-11 PROBLEM — F03.918 DEMENTIA WITH BEHAVIORAL DISTURBANCE: Status: ACTIVE | Noted: 2023-09-26

## 2024-04-11 PROBLEM — R78.81 BACTEREMIA: Status: ACTIVE | Noted: 2024-04-11

## 2024-04-11 PROBLEM — D53.9 MACROCYTIC ANEMIA: Status: ACTIVE | Noted: 2024-04-11

## 2024-04-11 PROBLEM — A40.9 SEPSIS DUE TO STREPTOCOCCUS SPECIES: Status: ACTIVE | Noted: 2024-04-11

## 2024-04-11 LAB
ALBUMIN SERPL BCP-MCNC: 2.4 G/DL (ref 3.5–5.2)
ALP SERPL-CCNC: 78 U/L (ref 55–135)
ALT SERPL W/O P-5'-P-CCNC: 9 U/L (ref 10–44)
ANION GAP SERPL CALC-SCNC: 6 MMOL/L (ref 8–16)
AST SERPL-CCNC: 17 U/L (ref 10–40)
BASOPHILS # BLD AUTO: 0.11 K/UL (ref 0–0.2)
BASOPHILS NFR BLD: 0.9 % (ref 0–1.9)
BILIRUB SERPL-MCNC: 0.2 MG/DL (ref 0.1–1)
BUN SERPL-MCNC: 27 MG/DL (ref 10–30)
CALCIUM SERPL-MCNC: 8.4 MG/DL (ref 8.7–10.5)
CHLORIDE SERPL-SCNC: 102 MMOL/L (ref 95–110)
CO2 SERPL-SCNC: 22 MMOL/L (ref 23–29)
CREAT SERPL-MCNC: 0.8 MG/DL (ref 0.5–1.4)
DIFFERENTIAL METHOD BLD: ABNORMAL
EOSINOPHIL # BLD AUTO: 0.2 K/UL (ref 0–0.5)
EOSINOPHIL NFR BLD: 1.9 % (ref 0–8)
ERYTHROCYTE [DISTWIDTH] IN BLOOD BY AUTOMATED COUNT: 14.2 % (ref 11.5–14.5)
EST. GFR  (NO RACE VARIABLE): >60 ML/MIN/1.73 M^2
FOLATE SERPL-MCNC: 6.1 NG/ML (ref 4–24)
GLUCOSE SERPL-MCNC: 85 MG/DL (ref 70–110)
HCT VFR BLD AUTO: 34.7 % (ref 37–48.5)
HGB BLD-MCNC: 10.8 G/DL (ref 12–16)
IMM GRANULOCYTES # BLD AUTO: 0.07 K/UL (ref 0–0.04)
IMM GRANULOCYTES NFR BLD AUTO: 0.6 % (ref 0–0.5)
LYMPHOCYTES # BLD AUTO: 2.4 K/UL (ref 1–4.8)
LYMPHOCYTES NFR BLD: 19.1 % (ref 18–48)
MAGNESIUM SERPL-MCNC: 1.9 MG/DL (ref 1.6–2.6)
MCH RBC QN AUTO: 30.9 PG (ref 27–31)
MCHC RBC AUTO-ENTMCNC: 31.1 G/DL (ref 32–36)
MCV RBC AUTO: 99 FL (ref 82–98)
MONOCYTES # BLD AUTO: 1 K/UL (ref 0.3–1)
MONOCYTES NFR BLD: 7.9 % (ref 4–15)
NEUTROPHILS # BLD AUTO: 8.8 K/UL (ref 1.8–7.7)
NEUTROPHILS NFR BLD: 69.6 % (ref 38–73)
NRBC BLD-RTO: 0 /100 WBC
PHOSPHATE SERPL-MCNC: 2.9 MG/DL (ref 2.7–4.5)
PLATELET # BLD AUTO: 377 K/UL (ref 150–450)
PMV BLD AUTO: 8.8 FL (ref 9.2–12.9)
POTASSIUM SERPL-SCNC: 4.3 MMOL/L (ref 3.5–5.1)
PROT SERPL-MCNC: 5.4 G/DL (ref 6–8.4)
RBC # BLD AUTO: 3.49 M/UL (ref 4–5.4)
SODIUM SERPL-SCNC: 130 MMOL/L (ref 136–145)
VANCOMYCIN SERPL-MCNC: 14.1 UG/ML
VIT B12 SERPL-MCNC: 382 PG/ML (ref 210–950)
WBC # BLD AUTO: 12.63 K/UL (ref 3.9–12.7)

## 2024-04-11 PROCEDURE — 11000001 HC ACUTE MED/SURG PRIVATE ROOM

## 2024-04-11 PROCEDURE — 83735 ASSAY OF MAGNESIUM: CPT

## 2024-04-11 PROCEDURE — 97535 SELF CARE MNGMENT TRAINING: CPT | Mod: CO

## 2024-04-11 PROCEDURE — 97530 THERAPEUTIC ACTIVITIES: CPT

## 2024-04-11 PROCEDURE — 82610 CYSTATIN C: CPT

## 2024-04-11 PROCEDURE — 63600175 PHARM REV CODE 636 W HCPCS

## 2024-04-11 PROCEDURE — 87040 BLOOD CULTURE FOR BACTERIA: CPT

## 2024-04-11 PROCEDURE — 82746 ASSAY OF FOLIC ACID SERUM: CPT | Performed by: STUDENT IN AN ORGANIZED HEALTH CARE EDUCATION/TRAINING PROGRAM

## 2024-04-11 PROCEDURE — 36415 COLL VENOUS BLD VENIPUNCTURE: CPT | Performed by: STUDENT IN AN ORGANIZED HEALTH CARE EDUCATION/TRAINING PROGRAM

## 2024-04-11 PROCEDURE — 25000003 PHARM REV CODE 250

## 2024-04-11 PROCEDURE — 80053 COMPREHEN METABOLIC PANEL: CPT

## 2024-04-11 PROCEDURE — 80202 ASSAY OF VANCOMYCIN: CPT | Performed by: STUDENT IN AN ORGANIZED HEALTH CARE EDUCATION/TRAINING PROGRAM

## 2024-04-11 PROCEDURE — 82607 VITAMIN B-12: CPT | Performed by: STUDENT IN AN ORGANIZED HEALTH CARE EDUCATION/TRAINING PROGRAM

## 2024-04-11 PROCEDURE — 25000003 PHARM REV CODE 250: Performed by: STUDENT IN AN ORGANIZED HEALTH CARE EDUCATION/TRAINING PROGRAM

## 2024-04-11 PROCEDURE — 97116 GAIT TRAINING THERAPY: CPT

## 2024-04-11 PROCEDURE — 36415 COLL VENOUS BLD VENIPUNCTURE: CPT

## 2024-04-11 PROCEDURE — 85025 COMPLETE CBC W/AUTO DIFF WBC: CPT

## 2024-04-11 PROCEDURE — 84100 ASSAY OF PHOSPHORUS: CPT

## 2024-04-11 RX ADMIN — ACETAMINOPHEN 1000 MG: 500 TABLET ORAL at 05:04

## 2024-04-11 RX ADMIN — HEPARIN SODIUM 5000 UNITS: 5000 INJECTION INTRAVENOUS; SUBCUTANEOUS at 02:04

## 2024-04-11 RX ADMIN — POLYETHYLENE GLYCOL 3350 17 G: 17 POWDER, FOR SOLUTION ORAL at 09:04

## 2024-04-11 RX ADMIN — ACETAMINOPHEN 1000 MG: 500 TABLET ORAL at 09:04

## 2024-04-11 RX ADMIN — HEPARIN SODIUM 5000 UNITS: 5000 INJECTION INTRAVENOUS; SUBCUTANEOUS at 09:04

## 2024-04-11 RX ADMIN — NIFEDIPINE 30 MG: 30 TABLET, FILM COATED, EXTENDED RELEASE ORAL at 09:04

## 2024-04-11 RX ADMIN — METOPROLOL SUCCINATE 25 MG: 25 TABLET, EXTENDED RELEASE ORAL at 09:04

## 2024-04-11 RX ADMIN — CEFTRIAXONE 2 G: 2 INJECTION, POWDER, FOR SOLUTION INTRAMUSCULAR; INTRAVENOUS at 06:04

## 2024-04-11 RX ADMIN — THERA TABS 1 TABLET: TAB at 09:04

## 2024-04-11 RX ADMIN — HYPROMELLOSE 2910 1 DROP: 5 SOLUTION/ DROPS OPHTHALMIC at 09:04

## 2024-04-11 RX ADMIN — LISINOPRIL 40 MG: 20 TABLET ORAL at 09:04

## 2024-04-11 RX ADMIN — HEPARIN SODIUM 5000 UNITS: 5000 INJECTION INTRAVENOUS; SUBCUTANEOUS at 06:04

## 2024-04-11 NOTE — NURSING
Nurses Note -- 4 Eyes      4/11/2024   8:23 AM      Skin assessed during: Q Shift Change      [] No Altered Skin Integrity Present    []Prevention Measures Documented      [x] Yes- Altered Skin Integrity Present or Discovered   [] LDA Added if Not in Epic (Describe Wound)   [x] New Altered Skin Integrity was Present on Admit and Documented in LDA   [] Wound Image Taken    Wound Care Consulted? Yes    Attending Nurse:  Braden Kruse RN/Staff Member:  Monalisa

## 2024-04-11 NOTE — PLAN OF CARE
Problem: Adult Inpatient Plan of Care  Goal: Patient-Specific Goal (Individualized)  Description: Pt will maintain sbp below 180  Outcome: Ongoing, Progressing  Flowsheets (Taken 4/11/2024 0354)  Anxieties, Fears or Concerns: none  Individualized Care Needs: care clustered  Goal: Optimal Comfort and Wellbeing  Outcome: Ongoing, Progressing  Intervention: Monitor Pain and Promote Comfort  Flowsheets (Taken 4/11/2024 0354)  Pain Management Interventions: care clustered  Intervention: Provide Person-Centered Care  Flowsheets (Taken 4/11/2024 0354)  Trust Relationship/Rapport:   care explained   choices provided     Acetaminophen given x1 for pain. VSS. Bed in lowest position, side rails x3, call light in use.

## 2024-04-11 NOTE — ASSESSMENT & PLAN NOTE
89F presenting with AMS and falls resulting in laceration. Blood cultures drawn on admission. Both cultures positive for Streptococcus species.     Repeat blood cultures until negative for 48 hours  Patient placed on vancomycin, pending sensitivities

## 2024-04-11 NOTE — PROGRESS NOTES
Therapy with vancomycin complete and/or consult discontinued by provider.  Pharmacy will sign off, please re-consult as needed.    Ayde Tinajero, PharmD  PGY1 Pharmacy Resident

## 2024-04-11 NOTE — ASSESSMENT & PLAN NOTE
Pt presenting with confusion, leukocytosis, UA was consistent with a urinary tract infection. Pt chart does not have any resistant cultures, and patient is unaware of any. With her at a nursing home there is risk for moire resistant bugs, but vital signs have been stable. Lactic acid was negative. Patient's mentation is improving after two days of IV ceftriaxone.    Plan  Ceftriaxone 2g daily  Follow urine culture. Positive for E. Coli

## 2024-04-11 NOTE — NURSING
Nurses Note -- 4 Eyes      4/11/2024   1:40 AM      Skin assessed during: Daily Assessment      [] No Altered Skin Integrity Present    []Prevention Measures Documented      [x] Yes- Altered Skin Integrity Present or Discovered   [x] LDA Added if Not in Epic (Describe Wound)   [] New Altered Skin Integrity was Present on Admit and Documented in LDA   [] Wound Image Taken    Wound Care Consulted? Yes    Attending Nurse:  Monalisa Kruse RN/Staff Member:  Pedrito Daley

## 2024-04-11 NOTE — MEDICAL/APP STUDENT
Progress Note    Subjective:    Principal Problem: Metabolic encephalopathy    HPI:  Jayne pAonte is a 89 year-old female with PMHx of COPD, HTN, CKD3, colon cancer 2003, breast cancer 2006, skin cancer, dementia, functional decline, and malnutrition who presented with worsening altered mental status and two falls at her nursing home. Per discussion with ED staff from the nursing home, patient has been confused with hallucinations for 2 days. Falls were unwitnessed but believe LOC event unlikely. Patient is unable to clarify what led to her falls.     ED course was notable for:  -CT head showed no acute intracranial process  -CT c-spine revealed no fractures  -hypertensive upon arrival (175/77) and given HCTZ, metoprolol succinate, and lisinopril.  -WBC: 15.94  -Na+: 134  -Urinalysis: >100 WBC/hpf, occasional WBC clumps  -VBG: pH 7.312, PCO2 51.6, PO2 23, HCO3 26.1 (respiratory acidosis)    Hospital course:  Patient was admitted on IV vancomycin and ceftriaxone while waiting for urine and blood culture growth. On the evening of 4/9, nurses noted that the patient was agitated and attempting to get out of bed. She was given 2 mg haloperidol per hospital team rec. Morning labs on 4/10 revealed hypoglycemia with BGL 51. Patient was given IV dextrose and placed on a soft, bite sized diet. Urine culture grew e.coli (covered on IV ceftriaxone) and blood cultures were positive for streptococcus species for which IV vanc was started while waiting for sensitivity data. Hyponatremia worsened from 134 to 132 and HCTZ was discontinued and replaced with nifedipine.       Interval History:  There were no acute overnight events. Nurses denied any agitation. Patient's mentation improving on current treatment. Continues to be alert to self, but not place or time (consistently reports she is at nursing home and that the year is 1924). Patient continues to express wishes for LLE stitches to be removed (procedure on 4/3). Blood  cultures positive for strep gordonii and mitis. Urine culture sensitivity returned with resistance to ciprofloxacin, levofloxacin, and ampicillin.    Objective:    Vitals:    04/11/24 0932 04/11/24 1127 04/11/24 1540 04/11/24 1906   BP: (!) 117/56 (!) 120/56 130/62 (!) 124/59   BP Location:   Left arm Left arm   Patient Position:   Sitting Lying   Pulse: 63 65 68 69   Resp:  20 18 15   Temp:  97.6 °F (36.4 °C) 97.3 °F (36.3 °C) 97.7 °F (36.5 °C)   TempSrc:  Axillary Oral Oral   SpO2:  97% 96% 97%   Weight:       Height:            Wt Readings from Last 1 Encounters:   04/08/24 54.4 kg (120 lb)     Body mass index is 19.97 kg/m².]       Intake/Output Summary (Last 24 hours) at 4/11/2024 2053  Last data filed at 4/11/2024 1803  Gross per 24 hour   Intake 592 ml   Output 710 ml   Net -118 ml             Physical Exam  Constitutional:       General: She is not in acute distress.     Appearance: She is ill-appearing. She is not diaphoretic.      Comments: Oriented only to name, disoriented to place and time, which is her baseline.   HENT:      Head: Contusion and laceration present.        Comments: 4cm laceration to R frontal, sutures in place. Ecchymosis extending from around the laceration inferiorly to R maxilla     Nose: Nose normal.   Eyes:      Extraocular Movements: Extraocular movements intact.      Right eye: Normal extraocular motion and no nystagmus.      Left eye: Normal extraocular motion and no nystagmus.      Pupils: Pupils are equal, round, and reactive to light.   Cardiovascular:      Rate and Rhythm: Normal rate and regular rhythm.      Pulses: Normal pulses.      Heart sounds: Normal heart sounds.   Pulmonary:      Effort: Pulmonary effort is normal.      Breath sounds: Normal breath sounds.   Abdominal:      General: Abdomen is flat. There is no distension.      Palpations: Abdomen is soft. There is no mass.      Tenderness: There is no abdominal tenderness.   Musculoskeletal:         General: Signs  of injury (LLE laceration with sutures in place. No signs of infection.) present. No swelling, tenderness or deformity.      Cervical back: Normal range of motion. No rigidity or tenderness.      Right lower leg: No edema.      Left lower leg: No edema.   Skin:     General: Skin is warm and dry.      Capillary Refill: Capillary refill takes less than 2 seconds.   Neurological:      Mental Status: She is alert. Mental status is at baseline. She is disoriented.   Psychiatric:         Mood and Affect: Mood normal.        Assessment/Plan:    Metabolic Encephalopathy  -likely secondary to UTI and bacteremia  -continue on ceftriaxone  -delirium precautions    Bacteremia  -two blood cultures positive for Streptococcus species (mitis/oralis- listed as probable contaminant, and gordonii)  -continue IV ceftriaxone    UTI  -urine culture positive for e.coli sensitive to augmentin  -continue on ceftriaxone while admitted  -likely d/c to nursing facility with PO augmentin    Hyponatremia  -discontinue HCTZ  -follow daily CMP  -continue current diet

## 2024-04-11 NOTE — ASSESSMENT & PLAN NOTE
IMPROVING    89 F presenting from nursing facility with altered mental status and and 2 recent falls. Nursing facility reported recent visual hallucinations and delusions. In the emergency department she was afebrile. Found to have serum leukocytosis to 15.9 K with neutrophil predominance. Urinalysis with greater than 100 wbc's per HPF. Patient given doses of vancomycin and ceftriaxone in the ED. Thought to be secondary to bacteremia and UTI.    Ceftriaxone 2g daily for UTI, vancomycin per pharmacy for bacteremia  Fall precautions  Video monitoring  Delirium precautions, 8am lab draws

## 2024-04-11 NOTE — PROGRESS NOTES
Kirit Vargas - Neurosurgery ([facility])  [facility] Medicine  Progress Note    Patient Name: Jayne Aponte  MRN: [id number]  Patient Class: IP- Inpatient   Admission Date: 4/8/2024  Length of Stay: 2 days  Attending Physician: Sharla Del Angel*  Primary Care Provider: KHANH Corea MD        Subjective:     Principal Problem:Encephalopathy, metabolic        HPI:  89 yof with pmh on care everywhere of COPD, HTN, CKD3, colon cancer 2003, breast cancer 2006, skin cancer, dementia, functional decline, malnutrition presenting with worsening AMS and two falls at her nursing home. Pt had difficulty telling me why she was there. From discussion with ED staff from the nursing home pt has been confused and hallucinating for 2 days now, no known fevers. Fall was not witnessed but does not think she LOC. Pt does not know how she fell. Pt asked for how she was going to get a taxi during interview. Pt's paper work has that patient is a DNR    In the ED: CT head/neck negative for bleeds or fracture, glucose was WNL, Urine was positive for UTI. No prior resistant cultures, but higher risk coming from nursing home.     Overview/Hospital Course:  Ms. Aponte is a 89-year-old female with dementia and reported functional decline who presented with altered mental status and two falls at her nursing home resulting in a right head laceration. Due to her falls and altered mentation she receive a CT Head which showed no acute intracranial processes and CT cervical spine which showed no fractures. She was also hypertensive on arrival, and was given lisinopril, HCTZ, and metoprolol succinate. Upon admission, she was found to have an E. Coli UTI and was started on IV ceftriaxone. Her blood cultures were positive for streptococcus and she was started on vancomycin while awaiting for sensitivity data. During her stay, she developed mild hyponatremia, so HCTZ was discontinued and replaced with nifedipine. Her mentation improved while being  treated with antibiotics. She was noted to have a mild macrocytic anemia, so B12 and folate levels were ordered.           Interval History: No acute events overnight. Patient was reported to sleep well, with no agitation. She remained afebrile and hemodynamically stable. Blood cultures were positive for streptococcus, and urine culture was positive for E. Coli. Vancomycin ordered for new bacteremia and she is continuing on CTX for her E. Coli UTI. Hyponatremia worsened yesterday from 134 to 132, so HCTZ discontinued and patient to start nifedipine today. Patient's mentation continues to improve. She is alert to self and place, but not time.     Objective:     Vital Signs (Most Recent):  Temp: 97.5 °F (36.4 °C) (04/11/24 0732)  Pulse: 62 (04/11/24 0732)  Resp: 16 (04/11/24 0732)  BP: (!) 117/56 (04/11/24 0732)  SpO2: 98 % (04/11/24 0534) Vital Signs (24h Range):  Temp:  [97.4 °F (36.3 °C)-97.8 °F (36.6 °C)] 97.5 °F (36.4 °C)  Pulse:  [60-71] 62  Resp:  [16-18] 16  SpO2:  [97 %-99 %] 98 %  BP: (117-151)/(56-67) 117/56     Weight: 54.4 kg (120 lb)  Body mass index is 19.97 kg/m².    Intake/Output Summary (Last 24 hours) at 4/11/2024 0752  Last data filed at 4/11/2024 0600  Gross per 24 hour   Intake 100 ml   Output 810 ml   Net -710 ml         Physical Exam  Vitals and nursing note reviewed.   HENT:      Head: Normocephalic. Laceration present.      Comments: Large scalp wound, sutured up on R side of head, bruising around the sutures.      Nose: No congestion or rhinorrhea.      Mouth/Throat:      Mouth: Mucous membranes are dry.      Pharynx: Oropharynx is clear.   Eyes:      General: No scleral icterus.     Extraocular Movements: Extraocular movements intact.   Cardiovascular:      Rate and Rhythm: Normal rate and regular rhythm.   Pulmonary:      Effort: Pulmonary effort is normal. No respiratory distress.      Breath sounds: No wheezing.   Abdominal:      General: Abdomen is flat. There is no distension.       Palpations: Abdomen is soft.      Tenderness: There is no abdominal tenderness.   Musculoskeletal:      Right lower leg: No edema.      Left lower leg: No edema.   Skin:     General: Skin is warm and dry.      Coloration: Skin is pale.      Findings: Laceration present.      Comments: Laceration on R side of scalp and L tib/fib lesion wrapped up and bandage   Neurological:      Mental Status: She is alert. She is disoriented.      Motor: Weakness and atrophy present.      Comments: Oriented to name, situation, and place.              Significant Labs: All pertinent labs within the past 24 hours have been reviewed.    Significant Imaging: I have reviewed all pertinent imaging results/findings within the past 24 hours.    Assessment/Plan:      * Encephalopathy, metabolic  IMPROVING    89 F presenting from nursing facility with altered mental status and and 2 recent falls. Nursing facility reported recent visual hallucinations and delusions. In the emergency department she was afebrile. Found to have serum leukocytosis to 15.9 K with neutrophil predominance. Urinalysis with greater than 100 wbc's per HPF. Patient given doses of vancomycin and ceftriaxone in the ED. Thought to be secondary to bacteremia and UTI.    Ceftriaxone 2g daily for UTI, vancomycin per pharmacy for bacteremia  Fall precautions  Video monitoring  Delirium precautions, 8am lab draws      Macrocytic anemia  Patient's anemia is currently controlled. Has not received any PRBCs to date. Etiology likely d/t nutritional deficiency. MCV 99  Current CBC reviewed-   Lab Results   Component Value Date    HGB 11.8 (L) 04/10/2024    HCT 37.0 04/10/2024     Monitor serial CBC and transfuse if patient becomes hemodynamically unstable, symptomatic or H/H drops below 7/21.  B12 and Folate levels ordered    Bacteremia  89F presenting with AMS and falls resulting in laceration. Blood cultures drawn on admission. Both cultures positive for Streptococcus species.      Repeat blood cultures until negative for 48 hours  Patient placed on vancomycin, pending sensitivities      Delirium  Likely secondary to age, baseline dementia, and UTI. See UTI (urinary tract infection) and Encephalopathy, metabolic       Hallucination  Likely secondary to UTI. See UTI (urinary tract infection) and Encephalopathy, metabolic     History of skin cancer  89F with history of basal cell carcinoma.       Hyponatremia  Daily BMP  Level 6 diet ordered per SLP. Thin liquids, but no straws.  D/C HCTZ      UTI (urinary tract infection)  Pt presenting with confusion, leukocytosis, UA was consistent with a urinary tract infection. Pt chart does not have any resistant cultures, and patient is unaware of any. With her at a nursing home there is risk for moire resistant bugs, but vital signs have been stable. Lactic acid was negative. Patient's mentation is improving after two days of IV ceftriaxone.    Plan  Ceftriaxone 2g daily  Follow urine culture. Positive for E. Coli       HTN (hypertension)  Chronic, uncontrolled. Latest blood pressure and vitals reviewed-     Temp:  [97.4 °F (36.3 °C)-97.8 °F (36.6 °C)]   Pulse:  [60-71]   Resp:  [16-18]   BP: (117-151)/(56-67)   SpO2:  [97 %-99 %] .   Home meds for hypertension were reviewed and noted below.   Hypertension Medications               lisinopriL (PRINIVIL,ZESTRIL) 40 MG tablet Take 40 mg by mouth once daily.    metoprolol succinate (TOPROL-XL) 25 MG 24 hr tablet Take 25 mg by mouth once daily.            Will utilize p.r.n. blood pressure medication only if patient's blood pressure greater than 180/110 and she develops symptoms such as worsening chest pain or shortness of breath.  Lisinopril 40mg  Metoprolol 25mg BID (nebivolol not on formulary)  HCTZ discontinued for hyponatremia.   Start nifedipine 30mg 4/11.      Supraventricular tachycardia  Difficult to find her records, but from care everywhere patient was recently taking metoprolol 25mg BID, but  was recently transitioned to nebivolol 5mg for SVT.    Metoprolol 25mg BID      Stage 3 chronic kidney disease  Creatine stable for now. BMP reviewed- noted Estimated Creatinine Clearance: 29.7 mL/min (based on SCr of 0.8 mg/dL). according to latest data. Based on current GFR, CKD stage is stage 3 - GFR 30-59.  Monitor UOP and serial BMP and adjust therapy as needed. Renally dose meds. Avoid nephrotoxic medications and procedures.    Cystatin ordered to assess renal function while patient is on vancomycin        Hyperlipidemia        Dementia with behavioral disturbance  Pt has some baseline dementia, but for the last few days has been experiencing worsening symptoms of hallucinations and worsening confusion. Pt found to have a UTI. Most likely causing the worsening confusion      VTE Risk Mitigation (From admission, onward)           Ordered     heparin (porcine) injection 5,000 Units  Every 8 hours         04/09/24 1134     Reason for No Pharmacological VTE Prophylaxis  Once        Question:  Reasons:  Answer:  Active Bleeding    04/09/24 0500     IP VTE HIGH RISK PATIENT  Once         04/09/24 0500     Place sequential compression device  Until discontinued         04/09/24 0500                    Discharge Planning   ASHLEY: 4/12/2024     Code Status: DNR   Is the patient medically ready for discharge?: No    Reason for patient still in hospital (select all that apply): Treatment  Discharge Plan A: Return to nursing home                  Daniel Lopez MD  Department of Hospital Medicine   [facility] - Neurosurgery ([facility])

## 2024-04-11 NOTE — CARE UPDATE
I have reviewed the chart of Jayne Aponte who is hospitalized for the following:    Active Hospital Problems    Diagnosis    *Encephalopathy, metabolic     presented  from her nursing facility with altered mental status and  recent falls. Nursing facility reported recent visual hallucinations and delusions. In the emergency department she was afebrile.   Found to have serum leukocytosis to 15.9 K with neutrophil predominance.   Urinalysis with greater than 100 wbc's per HPF.   Patient given doses of vancomycin and ceftriaxone       Bacteremia    Macrocytic anemia    Sepsis due to Streptococcus species    History of skin cancer    Hallucination     Likely due to UTI      Delirium     Confused likely due to UTI      HTN (hypertension)    UTI (urinary tract infection)    Hyponatremia     POA, Na 134  BMP daily       Dementia with behavioral disturbance    Supraventricular tachycardia    Stage 3 chronic kidney disease        Lauren Rodas NP  [facility]

## 2024-04-11 NOTE — PT/OT/SLP PROGRESS
Physical Therapy Co-Treatment    Patient Name: Jayne Aponte   MRN: [id number]    Co-treatment performed for this visit due to patient need for two skilled therapists to ensure patient and staff safety and to accommodate for patient activity tolerance/pain management   Recommendations:     Discharge Recommendations: No Therapy Indicated (return to NH)  Discharge Equipment Recommendations: none  Barriers to discharge: None    Assessment:     Jayne Aponte is a 89 y.o. female admitted with a medical diagnosis of Encephalopathy, metabolic. She presents with the following impairments/functional limitations: weakness, impaired endurance, impaired self care skills, impaired functional mobility, gait instability, impaired balance, visual deficits, impaired cognition, decreased lower extremity function, decreased safety awareness, impaired fine motor, decreased coordination. Pt with good tolerance to therapy session on this date. Pt eager to complete OOB mobility despite verbalized fear of falling surrounding functional activities. Pt agreeable to ambulation trial following moderate encouragement and education. Pt most limited at this time by postural instability during gait trial and visual impairments. Pt able to navigate environment sufficiently with extensive verbal cues using RW. Pt remains appropriate for returning to NH with 24/7 assistance available. Pt would continue to benefit from skilled acute PT in order to address current deficits and progress functional mobility.     Rehab Prognosis: Fair; patient continues to benefit from acute skilled PT services to address these deficits and reach maximum level of function.  Recent Surgery: * No surgery found *      Plan:     During this hospitalization, patient to be seen 3 x/week to address the identified rehab impairments via gait training, therapeutic activities, therapeutic exercises, neuromuscular re-education and progress toward the following goals:    Plan of Care  "Expires:  05/09/24    Subjective     Chief Complaint: None verbalized  Patient/Family Comments/Goals: "My coffee is cold." "I can't do this stuff on my own."  Pain/Comfort:  Pain Rating 1: 0/10    Objective:     Communicated with RN prior to session. Patient found HOB elevated with telemetry, PureWick upon PT entry to room.     General Precautions: Standard, fall, aspiration  Orthopedic Precautions: N/A  Braces: N/A    Functional Mobility:  Bed Mobility:  Verbal cues for sequencing and technique  Scooting: minimum assistance  Supine to Sit: minimum assistance for trunk management  Transfers:    Sit to Stand: x1 rep from EOB and x1 rep from toilet; minimum assistance with rolling walker with cues for hand placement and foot placement  Toilet transfer: minimum assistance with rolling walker with cues for hand placement and foot placement using Step Transfer  Gait: Patient ambulated 16' x 2 with rolling walker and minimum assistance.   Patient demonstrates occasional unsteady gait, decreased step length, narrow base of support, decreased weight shift, decreased foot clearance, ambulates outside FARHAN of RW, flexed posture, decreased corbin, and shuffle gait.   Patient required cues for upright posture, gluteal activation, sequencing, rolling walker management, obstacle navigation, safe rolling walker usage, to ambulate within FARHAN of RW, increased step size, and increased foot clearance.  All lines remained intact throughout ambulation trial, gait belt utilized.  Balance:   Static Sitting: Good, able to maintain for 6 minute(s) with SBA-CGA  Dynamic Sitting: Good: Patient accepts moderate challenge, contact guard assistance  Pt donned/doffed gown while seated with OT  Static Standing: Fair, able to maintain for 2 minute(s) with minimum assistance  Pt completed pericare in standing with assist from OT  Dynamic Standing: Fair: Patient accepts minimal challenge, minimum assistance    AM-PAC 6 CLICK MOBILITY  Turning over in " bed (including adjusting bedclothes, sheets and blankets)?: 3  Sitting down on and standing up from a chair with arms (e.g., wheelchair, bedside commode, etc.): 3  Moving from lying on back to sitting on the side of the bed?: 3  Moving to and from a bed to a chair (including a wheelchair)?: 3  Need to walk in hospital room?: 3  Climbing 3-5 steps with a railing?: 3  Basic Mobility Total Score: 18     Therapeutic Activities and Exercises:  Patient educated on role of acute care PT and PT POC, safety while in hospital including calling nurse for mobility, and call light usage  Pt educated on the effects of bed rest and the importance of OOB activity. Pt encouraged to sit UIC majority of day as tolerated and continue daily transfers with nursing assist. Pt verbalized understanding.  Pt educated on importance of maximal participation in therapy session in order to reduce negative effects of prolonged sedentary positioning.   Answered all questions within PT scope of practice and addressed functional mobility concerns.  Pt with continent BM and urine during session on toilet. Pericare completed with assist from therapist. RN notified.    Patient left up in chair with all lines intact, call button in reach, RN notified, chair alarm on, and daughter present.    GOALS:   Multidisciplinary Problems       Physical Therapy Goals          Problem: Physical Therapy    Goal Priority Disciplines Outcome Goal Variances Interventions   Physical Therapy Goal     PT, PT/OT Ongoing, Progressing     Description: Goals to be met by: 24     Patient will increase functional independence with mobility by performin. Supine to sit with Contact Guard Assistance  2. Sit to supine with Contact Guard Assistance  3. Rolling to Left and Right with Contact Guard Assistance.  4. Sit to stand transfer with Contact Guard Assistance  5. Bed to chair transfer with Contact Guard Assistance using LRAD  6. Gait  x 25 feet with Contact Guard  Assistance using LRAD.   7. Lower extremity exercise program x15 reps per handout, with assistance as needed                         Time Tracking:     PT Received On: 04/11/24  PT Start Time: 1349     PT Stop Time: 1417  PT Total Time (min): 28 min     Billable Minutes: Gait Training 8 and Therapeutic Activity 20      Treatment Type: Treatment  PT/PTA: PT     Number of PTA visits since last PT visit: 0     04/11/2024

## 2024-04-11 NOTE — PLAN OF CARE
Kirit Vargas - Neurosurgery ([facility])  Discharge Reassessment    Per MD team, patient is not medically ready for d/c at this time. Patient is a shelter resident at [facility] Phone: [phone]. Plan is to return to [facility] when ready. SW spoke w/patient's son, LAKEISHA Mclean ([phone]) to provide update on ASHLEY 4/12/24. All questions addressed. Will continue to follow for needs.    Primary Care Provider: KHANH Corea MD    Expected Discharge Date: 4/12/2024    Reassessment (most recent)       Discharge Reassessment - 04/11/24 7951          Discharge Reassessment    Assessment Type Discharge Planning Reassessment     Did the patient's condition or plan change since previous assessment? No     Discharge Plan discussed with: Adult children   LAKEISHA Mclean (Son) [phone]    Communicated ASHLEY with patient/caregiver Yes (P)      Discharge Plan A Return to nursing home (P)      Discharge Plan B Return to Nursing Home (P)      DME Needed Upon Discharge  other (see comments) (P)    TBD    Transition of Care Barriers None (P)      Why the patient remains in the hospital Requires continued medical care (P)         Post-Acute Status    Post-Acute Authorization Placement (P)      Post-Acute Placement Status Set-up Complete/Auth obtained (P)      Discharge Delays None known at this time (P)                    Previous CM met with patient/family to review discharge recommendation of return to NH and is agreeable to plan.    Patient/family provided list of facilities in-network with patient's payor plan. Providers that are owned, operated, or affiliated with [facility] are included on the list.     Notified that return referral sent to below listed facilities from in-network list based on proximity to home/family support:     [facility] Phone: [phone]    Patient/family instructed to identify preference.    Preferred Facility: (if more than 1, listed in order  of descending preference)    [facility] Phone: [phone]    If an additional preferred facility not listed above is identified, additional referral to be sent. If above facilities unable to accept, will send additional referrals to in-network providers.     Discharge Plan A and Plan B have been determined by review of patient's clinical status, future medical and therapeutic needs, and coverage/benefits for post-acute care in coordination with multidisciplinary team members.    KATE Villanueva, LMSW    Case Management Department  [facility]

## 2024-04-11 NOTE — SUBJECTIVE & OBJECTIVE
Interval History: No acute events overnight. Patient was reported to sleep well, with no agitation. She remained afebrile and hemodynamically stable. Blood cultures were positive for streptococcus, and urine culture was positive for E. Coli. Vancomycin ordered for new bacteremia and she is continuing on CTX for her E. Coli UTI. Hyponatremia worsened yesterday from 134 to 132, so HCTZ discontinued and patient to start nifedipine today. Patient's mentation continues to improve. She is alert to self and place, but not time.     Objective:     Vital Signs (Most Recent):  Temp: 97.5 °F (36.4 °C) (04/11/24 0732)  Pulse: 62 (04/11/24 0732)  Resp: 16 (04/11/24 0732)  BP: (!) 117/56 (04/11/24 0732)  SpO2: 98 % (04/11/24 0534) Vital Signs (24h Range):  Temp:  [97.4 °F (36.3 °C)-97.8 °F (36.6 °C)] 97.5 °F (36.4 °C)  Pulse:  [60-71] 62  Resp:  [16-18] 16  SpO2:  [97 %-99 %] 98 %  BP: (117-151)/(56-67) 117/56     Weight: 54.4 kg (120 lb)  Body mass index is 19.97 kg/m².    Intake/Output Summary (Last 24 hours) at 4/11/2024 0752  Last data filed at 4/11/2024 0600  Gross per 24 hour   Intake 100 ml   Output 810 ml   Net -710 ml         Physical Exam  Vitals and nursing note reviewed.   HENT:      Head: Normocephalic. Laceration present.      Comments: Large scalp wound, sutured up on R side of head, bruising around the sutures.      Nose: No congestion or rhinorrhea.      Mouth/Throat:      Mouth: Mucous membranes are dry.      Pharynx: Oropharynx is clear.   Eyes:      General: No scleral icterus.     Extraocular Movements: Extraocular movements intact.   Cardiovascular:      Rate and Rhythm: Normal rate and regular rhythm.   Pulmonary:      Effort: Pulmonary effort is normal. No respiratory distress.      Breath sounds: No wheezing.   Abdominal:      General: Abdomen is flat. There is no distension.      Palpations: Abdomen is soft.      Tenderness: There is no abdominal tenderness.   Musculoskeletal:      Right lower leg: No  edema.      Left lower leg: No edema.   Skin:     General: Skin is warm and dry.      Coloration: Skin is pale.      Findings: Laceration present.      Comments: Laceration on R side of scalp and L tib/fib lesion wrapped up and bandage   Neurological:      Mental Status: She is alert. She is disoriented.      Motor: Weakness and atrophy present.      Comments: Oriented to name, situation, and place.              Significant Labs: All pertinent labs within the past 24 hours have been reviewed.    Significant Imaging: I have reviewed all pertinent imaging results/findings within the past 24 hours.

## 2024-04-11 NOTE — ASSESSMENT & PLAN NOTE
Patient's anemia is currently controlled. Has not received any PRBCs to date. Etiology likely d/t nutritional deficiency. MCV 99  Current CBC reviewed-   Lab Results   Component Value Date    HGB 11.8 (L) 04/10/2024    HCT 37.0 04/10/2024     Monitor serial CBC and transfuse if patient becomes hemodynamically unstable, symptomatic or H/H drops below 7/21.  B12 and Folate levels ordered

## 2024-04-11 NOTE — PT/OT/SLP PROGRESS
"Occupational Therapy   Co-Treatment with PT    Name: Jayne Aponte  MRN: [id number]  Admitting Diagnosis:  Encephalopathy, metabolic       Recommendations:     Discharge Recommendations: No Therapy Indicated (return to NH with 24/7 A)  Discharge Equipment Recommendations:  none  Barriers to discharge:  None    Assessment:     Jayne Aponte is a 89 y.o. female with a medical diagnosis of Encephalopathy, metabolic.  She presents with the following performance deficits affecting function are weakness, impaired endurance, impaired self care skills, impaired functional mobility, gait instability, impaired balance, decreased safety awareness, decreased lower extremity function, decreased upper extremity function, impaired cognition, decreased coordination, impaired coordination, decreased ROM, impaired skin. Patient limited by vision deficits but overall Patient made good progress with functional mobility ambulating to bathroom to complete ADLs with limited activity tolerance. Continue OT POC.     Rehab Prognosis:  Good; patient would benefit from acute skilled OT services to address these deficits and reach maximum level of function.       Plan:     Patient to be seen 3 x/week to address the above listed problems via self-care/home management, therapeutic activities, therapeutic exercises, neuromuscular re-education  Plan of Care Expires: 05/09/24  Plan of Care Reviewed with: patient, daughter    Subjective     Chief Complaint: "This coffee is ice cold!"  Patient/Family Comments/goals: patient agreeable to therapy "I'm going to need help."  Pain/Comfort:  Pain Rating 1: 0/10  Pain Rating Post-Intervention 1: 0/10    Objective:     Communicated with: nurse neal and OTR prior to session.  Patient found HOB elevated with telemetry, PureWick upon OT entry to room.  A client care conference was completed by the OTR and the BRYANT prior to treatment by the BRYANT to discuss the patient's POC and current status.    General " Precautions: Standard, aspiration, fall    Orthopedic Precautions:N/A  Braces: N/A  Respiratory Status: Room air     Occupational Performance:     Bed Mobility:    Patient completed Scooting/Bridging with contact guard assistance  Patient completed Supine to Sit with minimum assistance and HOB elevated      Functional Mobility/Transfers:  Patient completed Sit <> Stand Transfer with minimum assistance  with  rolling walker   2nd trial from toilet with Min(A), RW  Patient completed Bed > Chair Transfer using Step Transfer technique with minimum assistance with rolling walker  Patient completed Toilet Transfer Step Transfer technique with minimum assistance with  rolling walker  Functional Mobility: to/from bathroom 16ft x2 using RW with Min(A)    Activities of Daily Living:  Grooming: supervision facial hygiene seated in chair  Upper Body Dressing: minimum assistance doff and don clean gown seated  Toileting: moderate assistance decreased endurance to complete perirectal hygiene in standing and manage gown; patient was able to complete partial perineal hygiene seated      AMPAC 6 Click ADL: 17    Treatment & Education:  Patient edu on OT POC, goals, and current progress. Addressed all patient questions/concerns within BRYANT scope of practice. Tactile cue placed on call button 2/2 patient vision deficits. Coffee reheated. Nurse advised to order patient BSC Co-treatment performed with PT due to patient's complexity and benefit of 2 skilled therapists to facilitate functional and safe occupational performance, accommodate patient's activity tolerance, and maximize patient's participation in therapy.      Patient left up in chair with all lines intact, call button in reach, chair alarm on, nurse notified, and daughter present    GOALS:   Multidisciplinary Problems       Occupational Therapy Goals          Problem: Occupational Therapy    Goal Priority Disciplines Outcome Interventions   Occupational Therapy Goal     OT,  PT/OT Ongoing, Progressing    Description: Goals to be met by: 5/9/24 (1 month)     Patient will increase functional independence with ADLs by performing:    UE Dressing with SBA  Grooming while standing with SBA  Toileting from toilet with SBA for hygiene and clothing management.   Rolling to Bilateral with Stand-by Assistance.   Supine to sit with Stand-by Assistance.  Step transfer with SBA  Toilet transfer to toilet with SBA                       Time Tracking:     OT Date of Treatment: 04/11/24  OT Start Time: 1349  OT Stop Time: 1418  OT Total Time (min): 29 min    Billable Minutes:Self Care/Home Management 29    OT/MARIA G: MARIA G     Number of MARIA G visits since last OT visit: 1 4/11/2024

## 2024-04-11 NOTE — ASSESSMENT & PLAN NOTE
Creatine stable for now. BMP reviewed- noted Estimated Creatinine Clearance: 29.7 mL/min (based on SCr of 0.8 mg/dL). according to latest data. Based on current GFR, CKD stage is stage 3 - GFR 30-59.  Monitor UOP and serial BMP and adjust therapy as needed. Renally dose meds. Avoid nephrotoxic medications and procedures.    Cystatin ordered to assess renal function while patient is on vancomycin

## 2024-04-11 NOTE — PLAN OF CARE
Patient from [facility].  Referral sent for patient to return once medically ready.   04/11/24 0916   Post-Acute Status   Post-Acute Authorization Placement   Post-Acute Placement Status Referrals Sent

## 2024-04-11 NOTE — ASSESSMENT & PLAN NOTE
Chronic, uncontrolled. Latest blood pressure and vitals reviewed-     Temp:  [97.4 °F (36.3 °C)-97.8 °F (36.6 °C)]   Pulse:  [60-71]   Resp:  [16-18]   BP: (117-151)/(56-67)   SpO2:  [97 %-99 %] .   Home meds for hypertension were reviewed and noted below.   Hypertension Medications               lisinopriL (PRINIVIL,ZESTRIL) 40 MG tablet Take 40 mg by mouth once daily.    metoprolol succinate (TOPROL-XL) 25 MG 24 hr tablet Take 25 mg by mouth once daily.            Will utilize p.r.n. blood pressure medication only if patient's blood pressure greater than 180/110 and she develops symptoms such as worsening chest pain or shortness of breath.  Lisinopril 40mg  Metoprolol 25mg BID (nebivolol not on formulary)  HCTZ discontinued for hyponatremia.   Start nifedipine 30mg 4/11.

## 2024-04-12 PROBLEM — A40.9 SEPSIS DUE TO STREPTOCOCCUS SPECIES: Status: RESOLVED | Noted: 2024-04-11 | Resolved: 2024-04-12

## 2024-04-12 LAB
ANION GAP SERPL CALC-SCNC: 5 MMOL/L (ref 8–16)
ANION GAP SERPL CALC-SCNC: 8 MMOL/L (ref 8–16)
BACTERIA BLD CULT: ABNORMAL
BACTERIA UR CULT: ABNORMAL
BUN SERPL-MCNC: 23 MG/DL (ref 10–30)
BUN SERPL-MCNC: 30 MG/DL (ref 10–30)
CALCIUM SERPL-MCNC: 8.4 MG/DL (ref 8.7–10.5)
CALCIUM SERPL-MCNC: 8.6 MG/DL (ref 8.7–10.5)
CHLORIDE SERPL-SCNC: 98 MMOL/L (ref 95–110)
CHLORIDE SERPL-SCNC: 98 MMOL/L (ref 95–110)
CO2 SERPL-SCNC: 21 MMOL/L (ref 23–29)
CO2 SERPL-SCNC: 25 MMOL/L (ref 23–29)
CREAT SERPL-MCNC: 0.8 MG/DL (ref 0.5–1.4)
CREAT SERPL-MCNC: 0.9 MG/DL (ref 0.5–1.4)
CYSTATIN C SERPL-MCNC: 1.71 MG/L (ref 0.67–1.21)
EST. GFR  (NO RACE VARIABLE): 56 ML/MIN/1.73 M^2
EST. GFR  (NO RACE VARIABLE): >60 ML/MIN/1.73 M^2
GFR/BSA.PRED SERPLBLD CYS-BASED-ARV: 30 ML/MIN/BSA
GLUCOSE SERPL-MCNC: 93 MG/DL (ref 70–110)
GLUCOSE SERPL-MCNC: 99 MG/DL (ref 70–110)
OSMOLALITY SERPL: 272 MOSM/KG (ref 275–295)
POTASSIUM SERPL-SCNC: 4.4 MMOL/L (ref 3.5–5.1)
POTASSIUM SERPL-SCNC: 4.6 MMOL/L (ref 3.5–5.1)
SODIUM SERPL-SCNC: 127 MMOL/L (ref 136–145)
SODIUM SERPL-SCNC: 128 MMOL/L (ref 136–145)
VIT B1 BLD-MCNC: 73 UG/L (ref 38–122)

## 2024-04-12 PROCEDURE — 83930 ASSAY OF BLOOD OSMOLALITY: CPT

## 2024-04-12 PROCEDURE — 63600175 PHARM REV CODE 636 W HCPCS

## 2024-04-12 PROCEDURE — 36415 COLL VENOUS BLD VENIPUNCTURE: CPT | Performed by: HOSPITALIST

## 2024-04-12 PROCEDURE — 25000003 PHARM REV CODE 250: Performed by: STUDENT IN AN ORGANIZED HEALTH CARE EDUCATION/TRAINING PROGRAM

## 2024-04-12 PROCEDURE — 25000003 PHARM REV CODE 250

## 2024-04-12 PROCEDURE — 11000001 HC ACUTE MED/SURG PRIVATE ROOM

## 2024-04-12 PROCEDURE — 83935 ASSAY OF URINE OSMOLALITY: CPT

## 2024-04-12 PROCEDURE — 80048 BASIC METABOLIC PNL TOTAL CA: CPT | Mod: 91

## 2024-04-12 PROCEDURE — 84300 ASSAY OF URINE SODIUM: CPT

## 2024-04-12 PROCEDURE — 80048 BASIC METABOLIC PNL TOTAL CA: CPT | Performed by: HOSPITALIST

## 2024-04-12 PROCEDURE — 36415 COLL VENOUS BLD VENIPUNCTURE: CPT

## 2024-04-12 RX ORDER — LISINOPRIL 40 MG/1
40 TABLET ORAL DAILY
Qty: 90 TABLET | Refills: 3 | Status: SHIPPED | OUTPATIENT
Start: 2024-04-13 | End: 2025-04-13

## 2024-04-12 RX ORDER — SODIUM CHLORIDE 9 MG/ML
INJECTION, SOLUTION INTRAVENOUS CONTINUOUS
Status: ACTIVE | OUTPATIENT
Start: 2024-04-12 | End: 2024-04-13

## 2024-04-12 RX ORDER — SODIUM CHLORIDE 9 MG/ML
INJECTION, SOLUTION INTRAVENOUS CONTINUOUS
Status: DISCONTINUED | OUTPATIENT
Start: 2024-04-12 | End: 2024-04-12

## 2024-04-12 RX ORDER — AMOXICILLIN AND CLAVULANATE POTASSIUM 500; 125 MG/1; MG/1
1 TABLET, FILM COATED ORAL EVERY 12 HOURS
Status: DISCONTINUED | OUTPATIENT
Start: 2024-04-13 | End: 2024-04-15 | Stop reason: HOSPADM

## 2024-04-12 RX ORDER — HALOPERIDOL 5 MG/ML
2 INJECTION INTRAMUSCULAR EVERY 6 HOURS PRN
Status: DISCONTINUED | OUTPATIENT
Start: 2024-04-12 | End: 2024-04-13

## 2024-04-12 RX ORDER — ONDANSETRON 4 MG/1
4 TABLET, FILM COATED ORAL ONCE
Status: COMPLETED | OUTPATIENT
Start: 2024-04-12 | End: 2024-04-12

## 2024-04-12 RX ORDER — AMOXICILLIN AND CLAVULANATE POTASSIUM 500; 125 MG/1; MG/1
1 TABLET, FILM COATED ORAL EVERY 12 HOURS
Qty: 6 TABLET | Refills: 0 | Status: SHIPPED | OUTPATIENT
Start: 2024-04-13 | End: 2024-04-15

## 2024-04-12 RX ADMIN — HEPARIN SODIUM 5000 UNITS: 5000 INJECTION INTRAVENOUS; SUBCUTANEOUS at 05:04

## 2024-04-12 RX ADMIN — SODIUM CHLORIDE 500 ML: 9 INJECTION, SOLUTION INTRAVENOUS at 11:04

## 2024-04-12 RX ADMIN — THERA TABS 1 TABLET: TAB at 08:04

## 2024-04-12 RX ADMIN — SODIUM CHLORIDE: 9 INJECTION, SOLUTION INTRAVENOUS at 02:04

## 2024-04-12 RX ADMIN — SODIUM CHLORIDE: 9 INJECTION, SOLUTION INTRAVENOUS at 07:04

## 2024-04-12 RX ADMIN — HYPROMELLOSE 2910 1 DROP: 5 SOLUTION/ DROPS OPHTHALMIC at 08:04

## 2024-04-12 RX ADMIN — ONDANSETRON 4 MG: 4 TABLET ORAL at 08:04

## 2024-04-12 RX ADMIN — POLYETHYLENE GLYCOL 3350 17 G: 17 POWDER, FOR SOLUTION ORAL at 08:04

## 2024-04-12 RX ADMIN — HALOPERIDOL LACTATE 2 MG: 5 INJECTION, SOLUTION INTRAMUSCULAR at 10:04

## 2024-04-12 RX ADMIN — HEPARIN SODIUM 5000 UNITS: 5000 INJECTION INTRAVENOUS; SUBCUTANEOUS at 02:04

## 2024-04-12 RX ADMIN — SODIUM CHLORIDE: 9 INJECTION, SOLUTION INTRAVENOUS at 06:04

## 2024-04-12 RX ADMIN — HYPROMELLOSE 2910 1 DROP: 5 SOLUTION/ DROPS OPHTHALMIC at 03:04

## 2024-04-12 RX ADMIN — LISINOPRIL 40 MG: 20 TABLET ORAL at 08:04

## 2024-04-12 RX ADMIN — CEFTRIAXONE 2 G: 2 INJECTION, POWDER, FOR SOLUTION INTRAMUSCULAR; INTRAVENOUS at 05:04

## 2024-04-12 RX ADMIN — METOPROLOL SUCCINATE 25 MG: 25 TABLET, EXTENDED RELEASE ORAL at 08:04

## 2024-04-12 NOTE — NURSING
Nurses Note -- 4 Eyes      4/11/2024   7:06 PM      Skin assessed during: Q Shift Change      [] No Altered Skin Integrity Present    [x]Prevention Measures Documented      [x] Yes- Altered Skin Integrity Present or Discovered   [] LDA Added if Not in Epic (Describe Wound)   [x] New Altered Skin Integrity was Present on Admit and Documented in LDA   [x] Wound Image Taken    Wound Care Consulted? Yes    Attending Nurse:  Odilia Kruse RN/Staff Member:  BASIL      MULTIPLE AREAS OF ALTERED SKIN INTEGRITY NOTED, SEE LDA FLOWSHEETS FOR DETAILS. WOUND CARE HAS SEEN PATIENT, SEE NOTES FOR DETAILS.

## 2024-04-12 NOTE — PLAN OF CARE
04/12/24 1550   Post-Acute Status   Post-Acute Authorization Placement   Post-Acute Placement Status Pending medical clearance/testing   Discharge Delays (!) Change in Medical Condition   Discharge Plan   Discharge Plan A Return to nursing home   Discharge Plan B Return to Nursing Home     JARED informed by MD team that patient should be medically ready for d/c tomorrow morning. JARED called Gali Alexander [phone] w/Admissions at [facility] to provide update and left message stating that JARED Piedad Hand will be covering patient tomorrow. JARED sent updated clinicals including NH orders to facility via [facility]. Will continue to follow for needs.    Discharge Plan A and Plan B have been determined by review of patient's clinical status, future medical and therapeutic needs, and coverage/benefits for post-acute care in coordination with multidisciplinary team members.    KATE Villanueva, LMSW    Case Management Department  [facility]

## 2024-04-12 NOTE — ASSESSMENT & PLAN NOTE
89-year-old woman with slowly worsening hyponatremia during admission.  Patient was started on hydrochlorothiazide for blood pressure management, but was noted to have a drop in sodium.  The medication was discontinued, but the half-life can range from 6-15 hours and will take 4-5 half lives to remove from the body, so there may be an effect for up to 75 hours.  She has also had poor oral intake due to her improving infections.  Due to her head trauma there was some suspicion for possible SIADH, but on 04/11 IV fluids were withheld and her sodium continued to decline, and on for 12 when she was given a small bolus of isotonic normal saline, her sodium level improved.  We suspect this is likely due to a combination of her age, poor oral intake, and hypovolemic hyponatremia.    Daily BMP  Level 6 diet ordered per SLP. Thin liquids, but no straws.  D/C HCTZ  Fluid resuscitation

## 2024-04-12 NOTE — NURSING
Nurses Note -- 4 Eyes      4/12/2024   9:46 AM      Skin assessed during: Daily Assessment      [] No Altered Skin Integrity Present    []Prevention Measures Documented      [x] Yes- Altered Skin Integrity Present or Discovered   [x] LDA Added if Not in Epic (Describe Wound)   [x] New Altered Skin Integrity was Present on Admit and Documented in LDA   [x] Wound Image Taken    Wound Care Consulted? Yes    Attending Nurse:  Liz Kruse RN/Staff Member:  ANA Hartley

## 2024-04-12 NOTE — ASSESSMENT & PLAN NOTE
89F presenting with AMS and falls resulting in laceration. Blood cultures drawn on admission. Both cultures positive for Streptococcus species and were suspected to be contaminants.     Repeat blood cultures negative for 48 hours  Patient placed on vancomycin, pending sensitivities

## 2024-04-12 NOTE — ASSESSMENT & PLAN NOTE
Pt presenting with confusion, leukocytosis, UA was consistent with a urinary tract infection. Pt chart does not have any resistant cultures, and patient is unaware of any. With her at a nursing home there is risk for moire resistant bugs, but vital signs have been stable. Lactic acid was negative. Patient's mentation is improving after two days of IV ceftriaxone.    Plan  Transition to oral Augmentin to complete 7 day course  Follow urine culture. Positive for E. Coli

## 2024-04-12 NOTE — PROGRESS NOTES
[facility] - Neurosurgery ([facility])  [facility] Medicine  Progress Note    Patient Name: Jayne Aponte  MRN: [id number]  Patient Class: IP- Inpatient   Admission Date: 4/8/2024  Length of Stay: 3 days  Attending Physician: Sharla Del Angel*  Primary Care Provider: KHANH Corea MD        Subjective:     Principal Problem:Encephalopathy, metabolic        HPI:  89 yof with pmh on care everywhere of COPD, HTN, CKD3, colon cancer 2003, breast cancer 2006, skin cancer, dementia, functional decline, malnutrition presenting with worsening AMS and two falls at her nursing home. Pt had difficulty telling me why she was there. From discussion with ED staff from the nursing home pt has been confused and hallucinating for 2 days now, no known fevers. Fall was not witnessed but does not think she LOC. Pt does not know how she fell. Pt asked for how she was going to get a taxi during interview. Pt's paper work has that patient is a DNR    In the ED: CT head/neck negative for bleeds or fracture, glucose was WNL, Urine was positive for UTI. No prior resistant cultures, but higher risk coming from nursing home.     Overview/Hospital Course:  Ms. Aponte is a 89-year-old female with dementia and reported functional decline who presented with altered mental status and two falls at her nursing home resulting in a right head laceration. Due to her falls and altered mentation she receive a CT Head which showed no acute intracranial processes and CT cervical spine which showed no fractures. She was also hypertensive on arrival, and was given lisinopril, HCTZ, and metoprolol succinate. Upon admission, she was found to have an E. Coli UTI and was started on IV ceftriaxone. Her blood cultures were positive for streptococcus and she was started on vancomycin while awaiting for sensitivity data. During her stay, she developed mild hyponatremia, so HCTZ was discontinued.  We suspect some of her hyponatremia due to lingering effect of  hydrochlorothiazide, possibly from her, but her sodium decreased when she was not given IV fluids, lower suspicion for SIADH.  Her mentation improved while being treated with antibiotics. She was noted to have a mild macrocytic anemia, so B12 and folate levels were ordered but were found to be within normal limits.  After her blood cultures were negative for 48 hours, and the urine culture sensitivities resulted, the patient was transitioned to oral Augmentin after receiving 4 days of IV ceftriaxone.           Interval History:  No acute events overnight.  Patient had softer blood pressures today, with blood pressure ranging 90 7-130 systolic.  Her metabolic panel showed decreased sodium from 01/30 down to 127.  Patient was given normal saline, and repeat BMP showed improvement in sodium to 128.  Plan to give more IV isotonic fluids and re-evaluate tomorrow.  IV ceftriaxone discontinued and patient transitioned to oral Augmentin to complete a 7 day course of antibiotics.  Her blood cultures remain negative.  She had some mild nausea this morning that resolved with IV fluids and a single dose of Zofran.    Objective:     Vital Signs (Most Recent):  Temp: 98.2 °F (36.8 °C) (04/12/24 1125)  Pulse: 71 (04/12/24 1125)  Resp: 18 (04/12/24 1125)  BP: (!) 140/67 (04/12/24 1125)  SpO2: 96 % (04/12/24 1125) Vital Signs (24h Range):  Temp:  [97.3 °F (36.3 °C)-98.2 °F (36.8 °C)] 98.2 °F (36.8 °C)  Pulse:  [67-71] 71  Resp:  [15-18] 18  SpO2:  [95 %-97 %] 96 %  BP: ()/(47-67) 140/67     Weight: 54.4 kg (120 lb)  Body mass index is 19.97 kg/m².    Intake/Output Summary (Last 24 hours) at 4/12/2024 1437  Last data filed at 4/12/2024 0800  Gross per 24 hour   Intake 718.8 ml   Output 200 ml   Net 518.8 ml         Physical Exam  Vitals and nursing note reviewed.   HENT:      Head: Normocephalic. Laceration present.      Comments: Large scalp wound, sutured up on R side of head, bruising around the sutures.      Nose: No  congestion or rhinorrhea.      Mouth/Throat:      Mouth: Mucous membranes are dry.      Pharynx: Oropharynx is clear.   Eyes:      General: No scleral icterus.     Extraocular Movements: Extraocular movements intact.   Cardiovascular:      Rate and Rhythm: Normal rate and regular rhythm.   Pulmonary:      Effort: Pulmonary effort is normal. No respiratory distress.      Breath sounds: No wheezing.   Abdominal:      General: Abdomen is flat. There is no distension.      Palpations: Abdomen is soft.      Tenderness: There is no abdominal tenderness.   Musculoskeletal:      Right lower leg: No edema.      Left lower leg: No edema.   Skin:     General: Skin is warm and dry.      Coloration: Skin is pale.      Findings: Laceration present.      Comments: Laceration on R side of scalp and L tib/fib lesion wrapped up and bandage   Neurological:      Mental Status: She is alert.      Motor: Atrophy present.      Comments: Oriented to name, situation, and place.              Significant Labs: All pertinent labs within the past 24 hours have been reviewed.  BMP:   Recent Labs   Lab 04/11/24  0829 04/12/24  0326 04/12/24  1315   GLU 85   < > 99   *   < > 128*   K 4.3   < > 4.4      < > 98   CO2 22*   < > 25   BUN 27   < > 23   CREATININE 0.8   < > 0.8   CALCIUM 8.4*   < > 8.4*   MG 1.9  --   --     < > = values in this interval not displayed.       Significant Imaging: I have reviewed all pertinent imaging results/findings within the past 24 hours.    Assessment/Plan:      * Encephalopathy, metabolic  IMPROVING    89 F presenting from nursing facility with altered mental status and and 2 recent falls. Nursing facility reported recent visual hallucinations and delusions. In the emergency department she was afebrile. Found to have serum leukocytosis to 15.9 K with neutrophil predominance. Urinalysis with greater than 100 wbc's per HPF. Patient given doses of vancomycin and ceftriaxone in the ED. Thought to be  secondary to bacteremia and UTI.    Ceftriaxone 2g daily for UTI, vancomycin per pharmacy for bacteremia  Fall precautions  Video monitoring  Delirium precautions, 8am lab draws      Macrocytic anemia  Patient's anemia is currently controlled. Has not received any PRBCs to date. Etiology likely d/t nutritional deficiency. MCV 99  Current CBC reviewed-   Lab Results   Component Value Date    HGB 11.8 (L) 04/10/2024    HCT 37.0 04/10/2024     Monitor serial CBC and transfuse if patient becomes hemodynamically unstable, symptomatic or H/H drops below 7/21.  B12 and Folate levels ordered    Bacteremia  89F presenting with AMS and falls resulting in laceration. Blood cultures drawn on admission. Both cultures positive for Streptococcus species and were suspected to be contaminants.     Repeat blood cultures negative for 48 hours  Patient placed on vancomycin, pending sensitivities      Delirium  Likely secondary to age, baseline dementia, and UTI. See UTI (urinary tract infection) and Encephalopathy, metabolic       Hallucination  Likely secondary to UTI. See UTI (urinary tract infection) and Encephalopathy, metabolic     History of skin cancer  89F with history of basal cell carcinoma.       Hyponatremia  89-year-old woman with slowly worsening hyponatremia during admission.  Patient was started on hydrochlorothiazide for blood pressure management, but was noted to have a drop in sodium.  The medication was discontinued, but the half-life can range from 6-15 hours and will take 4-5 half lives to remove from the body, so there may be an effect for up to 75 hours.  She has also had poor oral intake due to her improving infections.  Due to her head trauma there was some suspicion for possible SIADH, but on 04/11 IV fluids were withheld and her sodium continued to decline, and on for 12 when she was given a small bolus of isotonic normal saline, her sodium level improved.  We suspect this is likely due to a combination  of her age, poor oral intake, and hypovolemic hyponatremia.    Daily BMP  Level 6 diet ordered per SLP. Thin liquids, but no straws.  D/C HCTZ  Fluid resuscitation       UTI (urinary tract infection)  Pt presenting with confusion, leukocytosis, UA was consistent with a urinary tract infection. Pt chart does not have any resistant cultures, and patient is unaware of any. With her at a nursing home there is risk for moire resistant bugs, but vital signs have been stable. Lactic acid was negative. Patient's mentation is improving after two days of IV ceftriaxone.    Plan  Transition to oral Augmentin to complete 7 day course  Follow urine culture. Positive for E. Coli       HTN (hypertension)  Chronic, uncontrolled. Latest blood pressure and vitals reviewed-     Temp:  [97.4 °F (36.3 °C)-97.8 °F (36.6 °C)]   Pulse:  [60-71]   Resp:  [16-18]   BP: (117-151)/(56-67)   SpO2:  [97 %-99 %] .   Home meds for hypertension were reviewed and noted below.   Hypertension Medications               lisinopriL (PRINIVIL,ZESTRIL) 40 MG tablet Take 40 mg by mouth once daily.    metoprolol succinate (TOPROL-XL) 25 MG 24 hr tablet Take 25 mg by mouth once daily.            Will utilize p.r.n. blood pressure medication only if patient's blood pressure greater than 180/110 and she develops symptoms such as worsening chest pain or shortness of breath.  Lisinopril 40mg  Metoprolol 25mg BID (nebivolol not on formulary)  HCTZ discontinued for hyponatremia.   Start nifedipine 30mg 4/11.      Supraventricular tachycardia  Difficult to find her records, but from care everywhere patient was recently taking metoprolol 25mg BID, but was recently transitioned to nebivolol 5mg for SVT.    Metoprolol 25mg BID      Stage 3 chronic kidney disease  Creatine stable for now. BMP reviewed- noted Estimated Creatinine Clearance: 29.7 mL/min (based on SCr of 0.8 mg/dL). according to latest data. Based on current GFR, CKD stage is stage 3 - GFR 30-59.  Monitor  UOP and serial BMP and adjust therapy as needed. Renally dose meds. Avoid nephrotoxic medications and procedures.    Cystatin ordered to assess renal function while patient is on vancomycin        Hyperlipidemia        Dementia with behavioral disturbance  Pt has some baseline dementia, but for the last few days has been experiencing worsening symptoms of hallucinations and worsening confusion. Pt found to have a UTI. Most likely causing the worsening confusion      VTE Risk Mitigation (From admission, onward)           Ordered     heparin (porcine) injection 5,000 Units  Every 8 hours         04/09/24 1134     Reason for No Pharmacological VTE Prophylaxis  Once        Question:  Reasons:  Answer:  Active Bleeding    04/09/24 0500     IP VTE HIGH RISK PATIENT  Once         04/09/24 0500     Place sequential compression device  Until discontinued         04/09/24 0500                    Discharge Planning   ASHLEY: 4/13/2024     Code Status: DNR   Is the patient medically ready for discharge?: No    Reason for patient still in hospital (select all that apply): Treatment  Discharge Plan A: Return to nursing home   Discharge Delays: None known at this time        Daniel Lopez MD  Department of Hospital Medicine   [facility] - Neurosurgery ([facility])

## 2024-04-12 NOTE — ED PROVIDER NOTES
Source of History:  Chart    Chief complaint:  Fall (Trip and fall tn. Lac to forehead. Pt. Altered. No blood thinners. No LOC reported, but fall was not witnessed. Coming from [facility]. EMS and nursing facility reports pt. Has been confused and hallucinating X2 days. )      HPI:  Jayne Aponte is a 89 y.o. female with history of CKD, dementia, hypertension, presenting to emergency department after a fall at her nursing home.  Nursing home noted that patient seemed increasingly confused over the past several days.  She was not anticoagulated.  She was a large laceration to her head.  Patient is unable to answer any questions.  No further history available.    Review of patient's allergies indicates:  No Known Allergies    No current facility-administered medications on file prior to encounter.     Current Outpatient Medications on File Prior to Encounter   Medication Sig Dispense Refill    acetaminophen (TYLENOL) 500 MG tablet Take 1,000 mg by mouth every 8 (eight) hours as needed for Pain.      ergocalciferol (VITAMIN D2) 50,000 unit Cap Take 50,000 Units by mouth every 7 days. (FRIDAY)      meloxicam (MOBIC) 7.5 MG tablet Take 1 tablet by mouth once daily.      nebivoloL (BYSTOLIC) 5 MG Tab Take 1 tablet by mouth once daily.         PMH:  As per HPI and below:  Past Medical History:   Diagnosis Date    Cancer     Hypertension      Past Surgical History:   Procedure Laterality Date    BREAST SURGERY         Social History     Socioeconomic History    Marital status:    Tobacco Use    Smoking status: Never    Smokeless tobacco: Never   Substance and Sexual Activity    Alcohol use: No     Social Determinants of Health     Financial Resource Strain: Patient Unable To Answer (4/9/2024)    Overall Financial Resource Strain (CARDIA)     Difficulty of Paying Living Expenses: Patient unable to answer   Food Insecurity: Patient Unable To Answer (4/9/2024)    Hunger Vital Sign     Worried About Running Out of  Food in the Last Year: Patient unable to answer     Ran Out of Food in the Last Year: Patient unable to answer   Transportation Needs: Patient Unable To Answer (4/9/2024)    PRAPARE - Transportation     Lack of Transportation (Medical): Patient unable to answer     Lack of Transportation (Non-Medical): Patient unable to answer   Physical Activity: Patient Unable To Answer (4/9/2024)    Exercise Vital Sign     Days of Exercise per Week: Patient unable to answer     Minutes of Exercise per Session: Patient unable to answer   Stress: Patient Unable To Answer (4/9/2024)    [place] [place] - Occupational Stress Questionnaire     Feeling of Stress : Patient unable to answer   Social Connections: Patient Unable To Answer (4/9/2024)    Social Connection and Isolation Panel [NHANES]     Frequency of Communication with Friends and Family: Patient unable to answer     Frequency of Social Gatherings with Friends and Family: Patient unable to answer     Attends Holiness Services: Patient unable to answer     Active Member of Clubs or Organizations: Patient unable to answer     Attends Club or Organization Meetings: Patient unable to answer     Marital Status: Patient unable to answer   Housing Stability: Patient Unable To Answer (4/9/2024)    Housing Stability Vital Sign     Unable to Pay for Housing in the Last Year: Patient unable to answer     Unstable Housing in the Last Year: Patient unable to answer       History reviewed. No pertinent family history.    Physical Exam:      Vitals:    04/12/24 1513   BP: 128/70   Pulse: 62   Resp: 18   Temp: 97.2 °F (36.2 °C)     Gen: No acute distress.  Nontoxic.  Thin, chronically ill-appearing.  Mental Status:  Alert and oriented x1.  Hard of hearing, has difficulty following commands.  Skin: Warm, dry. No rashes seen.  Eyes: No conjunctival injection.  Pulm: CTAB. No increased work of breathing.  No significant tachypnea.  No audible stridor or wheezing.  No  conversational dyspnea.    CV: Regular rate. Regular rhythm.   Abd: Soft.  Not distended.  Nontender.   MSK: Good range of motion all joints.  No deformities.    Neuro: Awake. Speech normal. No focal neuro deficit observed.      Laboratory Studies:  Labs Reviewed   CBC W/ AUTO DIFFERENTIAL - Abnormal; Notable for the following components:       Result Value    WBC 15.94 (*)     RBC 3.85 (*)     Hematocrit 36.5 (*)     MCH 31.2 (*)     Immature Granulocytes 0.7 (*)     Gran # (ANC) 12.9 (*)     Immature Grans (Abs) 0.11 (*)     Mono # 1.3 (*)     Gran % 81.1 (*)     Lymph % 9.7 (*)     All other components within normal limits   COMPREHENSIVE METABOLIC PANEL - Abnormal; Notable for the following components:    Sodium 134 (*)     CO2 21 (*)     Albumin 3.0 (*)     eGFR 44.0 (*)     All other components within normal limits   URINALYSIS, REFLEX TO URINE CULTURE - Abnormal; Notable for the following components:    Appearance, UA Hazy (*)     Protein, UA 1+ (*)     Occult Blood UA Trace (*)     Leukocytes, UA 3+ (*)     All other components within normal limits    Narrative:     Specimen Source->Urine   URINALYSIS MICROSCOPIC - Abnormal; Notable for the following components:    RBC, UA 14 (*)     WBC, UA >100 (*)     WBC Clumps, UA Occasional (*)     Hyaline Casts, UA 2 (*)     All other components within normal limits    Narrative:     Specimen Source->Urine   POCT GLUCOSE - Abnormal; Notable for the following components:    POCT Glucose 115 (*)     All other components within normal limits   ISTAT PROCEDURE - Abnormal; Notable for the following components:    POC PH 7.312 (*)     POC PCO2 51.6 (*)     POC PO2 23 (*)     All other components within normal limits   HIV 1 / 2 ANTIBODY    Narrative:     Release to patient->Immediate   HEPATITIS C ANTIBODY    Narrative:     Release to patient->Immediate   TSH   LACTIC ACID, PLASMA   POCT GLUCOSE MONITORING CONTINUOUS       Chart reviewed.     Imaging Results              CT  Head Without Contrast (Final result)  Result time 04/09/24 00:31:32      Final result by Chas Chambers MD (04/09/24 00:31:32)                   Impression:      No acute intracranial abnormalities.    Senescent changes similar to prior.    Chronic left maxillary sinus disease, as above.      Electronically signed by: Chas Chambers MD  Date:    04/09/2024  Time:    00:31               Narrative:    EXAMINATION:  CT HEAD WITHOUT CONTRAST    CLINICAL HISTORY:  Head trauma, minor (Age >= 65y);    TECHNIQUE:  Low dose axial images were obtained through the head.  Coronal and sagittal reformations were also performed. Contrast was not administered.    COMPARISON:  01/11/2017.    FINDINGS:  The brain parenchyma appears normal for age with good corticomedullary differentiation.  There is no evidence of acute infarct, hemorrhage, or mass.  There is ventricular and sulcal enlargement consistent with generalized atrophy.  Moderate confluent decreased supratentorial white matter attenuation most likely related to chronic nonspecific small vessel disease.   No mass-effect or midline shift.  There are no abnormal extra-axial fluid collections.  Left maxillary antrum completely opacified with thickening of the bony walls of the left maxillary sinus suggesting chronic sinus disease.  Remaining paranasal sinuses and mastoid air cells are essentially clear .  The calvarium appears intact.  .                                       CT Cervical Spine Without Contrast (Final result)  Result time 04/09/24 00:58:04      Final result by Moncho Mckinney DO (04/09/24 00:58:04)                   Impression:      No acute fracture or subluxation of the cervical spine.      Electronically signed by: Moncho Mckinney  Date:    04/09/2024  Time:    00:58               Narrative:    EXAMINATION:  CT CERVICAL SPINE WITHOUT CONTRAST    CLINICAL HISTORY:  Neck trauma (Age >= 65y);    TECHNIQUE:  Low dose axial images, sagittal and coronal  reformations were performed though the cervical spine without intravenous contrast.    COMPARISON:  MRI cervical spine from 06/30/2017    FINDINGS:  Alignment: There is anterolisthesis of C4 on C5, retrolisthesis of C5 on C6, and anterolisthesis of C6 on C7.  There is straightening of the usual cervical lordosis.    Vertebra: There is no acute fracture or subluxation of the cervical spine.  The vertebral body heights are maintained.    Discs: There is multilevel disc height loss, most significant at C5-C6 where there is severe disc height loss.    Degenerative changes: There are multilevel degenerative changes of the cervical spine, are similar when compared with MRI dated 06/30/2017.    Miscellaneous: The soft tissues of the neck are unremarkable.  There is a heterogeneous left thyroid lobe lesion, which can be further evaluated with nonemergent thyroid ultrasound if clinically indicated.  There are vascular calcifications.  There is scarring noted in the right lung apex.                                       X-Ray Chest AP Portable (Final result)  Result time 04/09/24 00:05:42      Final result by Moncho Mckinney DO (04/09/24 00:05:42)                   Impression:      No acute abnormality.      Electronically signed by: Moncho Mckinney  Date:    04/09/2024  Time:    00:05               Narrative:    EXAMINATION:  XR CHEST AP PORTABLE    CLINICAL HISTORY:  Chest Pain;    TECHNIQUE:  Single frontal view of the chest was performed.    COMPARISON:  01/09/2023.    FINDINGS:  The lungs are hyperexpanded and clear.  No focal opacities are seen.  The pleural spaces are clear.  The cardiac silhouette is unremarkable.  There are calcifications of the aortic arch.  Osseous structures demonstrate degenerative changes.                                      Medications Given:  Medications   sodium chloride 0.9% flush 10 mL (has no administration in time range)   naloxone 0.4 mg/mL injection 0.02 mg (has no administration in  time range)   glucose chewable tablet 16 g (has no administration in time range)   glucose chewable tablet 24 g (has no administration in time range)   glucagon (human recombinant) injection 1 mg (has no administration in time range)   acetaminophen tablet 1,000 mg (1,000 mg Oral Given 4/11/24 2140)   polyethylene glycol packet 17 g (17 g Oral Given 4/12/24 0844)   acetaminophen tablet 650 mg (has no administration in time range)   dextrose 10% bolus 125 mL 125 mL (0 mLs Intravenous Stopped 4/10/24 0844)   dextrose 10% bolus 250 mL 250 mL (has no administration in time range)   metoprolol succinate (TOPROL-XL) 24 hr tablet 25 mg (25 mg Oral Given 4/12/24 0844)   lisinopriL tablet 40 mg (40 mg Oral Given 4/12/24 0844)   heparin (porcine) injection 5,000 Units (5,000 Units Subcutaneous Given 4/12/24 1400)   multivitamin tablet (1 tablet Oral Given 4/12/24 0844)   0.9%  NaCl infusion (0 mL/hr Intravenous Stopped 4/11/24 0259)   artificial tears 0.5 % ophthalmic solution 1 drop (1 drop Both Eyes Given 4/12/24 1500)   amoxicillin-clavulanate 500-125mg per tablet 500 mg (has no administration in time range)   0.9%  NaCl infusion ( Intravenous Canceled Entry 4/12/24 1515)   LIDOcaine-EPINEPHrine 1%-1:100,000 injection 10 mL (10 mLs Intradermal Given by Provider 4/9/24 0051)   haloperidol lactate injection 5 mg (5 mg Intravenous Given 4/9/24 0038)   haloperidol lactate injection 5 mg (5 mg Intravenous Given 4/9/24 0153)   vancomycin (VANCOCIN) 1,000 mg in dextrose 5 % (D5W) 250 mL IVPB (Vial-Mate) (0 mg Intravenous Stopped 4/9/24 0741)   haloperidol lactate injection 2 mg (2 mg Intravenous Given 4/9/24 2141)   sodium chloride 0.9% bolus 500 mL 500 mL (0 mLs Intravenous Stopped 4/10/24 1024)   vancomycin 750 mg in dextrose 5 % (D5W) 250 mL IVPB (Vial-Mate) (0 mg Intravenous Stopped 4/10/24 1333)   ondansetron tablet 4 mg (4 mg Oral Given 4/12/24 0844)   sodium chloride 0.9% bolus 500 mL 500 mL ( Intravenous Canceled Entry  4/12/24 2499)       Discussed with:  Internal Medicine    MDM:    89 y.o. female with encephalopathy accompanied by fall and large forehead laceration.  She was afebrile and hemodynamically stable.  Her workup here is consistent with urinary tract infection.  CTs without fracture or intracranial hemorrhage.  Laceration repaired by my resident, please see separate note.      Diagnostic Impression:    1. Acute encephalopathy    2. Fall    3. Laceration of forehead, initial encounter    4. Chest pain         ED Disposition Condition    Admit              Tuyet Cooney MD  Emergency Medicine         Tuyet Cooney MD  04/12/24 5368

## 2024-04-12 NOTE — PLAN OF CARE
Problem: Adult Inpatient Plan of Care  Goal: Plan of Care Review  Outcome: Ongoing, Progressing  Goal: Patient-Specific Goal (Individualized)  Description: Pt will maintain sbp below 180  Outcome: Ongoing, Progressing  Goal: Absence of Hospital-Acquired Illness or Injury  Outcome: Ongoing, Progressing  Goal: Optimal Comfort and Wellbeing  Outcome: Ongoing, Progressing  Goal: Readiness for Transition of Care  Outcome: Ongoing, Progressing     Problem: Impaired Wound Healing  Goal: Optimal Wound Healing  Outcome: Ongoing, Progressing     Problem: Skin Injury Risk Increased  Goal: Skin Health and Integrity  Outcome: Ongoing, Progressing     Problem: Fall Injury Risk  Goal: Absence of Fall and Fall-Related Injury  Outcome: Ongoing, Progressing

## 2024-04-12 NOTE — SUBJECTIVE & OBJECTIVE
Interval History:  No acute events overnight.  Patient had softer blood pressures today, with blood pressure ranging 90 7-130 systolic.  Her metabolic panel showed decreased sodium from 01/30 down to 127.  Patient was given normal saline, and repeat BMP showed improvement in sodium to 128.  Plan to give more IV isotonic fluids and re-evaluate tomorrow.  IV ceftriaxone discontinued and patient transitioned to oral Augmentin to complete a 7 day course of antibiotics.  Her blood cultures remain negative.  She had some mild nausea this morning that resolved with IV fluids and a single dose of Zofran.    Objective:     Vital Signs (Most Recent):  Temp: 98.2 °F (36.8 °C) (04/12/24 1125)  Pulse: 71 (04/12/24 1125)  Resp: 18 (04/12/24 1125)  BP: (!) 140/67 (04/12/24 1125)  SpO2: 96 % (04/12/24 1125) Vital Signs (24h Range):  Temp:  [97.3 °F (36.3 °C)-98.2 °F (36.8 °C)] 98.2 °F (36.8 °C)  Pulse:  [67-71] 71  Resp:  [15-18] 18  SpO2:  [95 %-97 %] 96 %  BP: ()/(47-67) 140/67     Weight: 54.4 kg (120 lb)  Body mass index is 19.97 kg/m².    Intake/Output Summary (Last 24 hours) at 4/12/2024 1437  Last data filed at 4/12/2024 0800  Gross per 24 hour   Intake 718.8 ml   Output 200 ml   Net 518.8 ml         Physical Exam  Vitals and nursing note reviewed.   HENT:      Head: Normocephalic. Laceration present.      Comments: Large scalp wound, sutured up on R side of head, bruising around the sutures.      Nose: No congestion or rhinorrhea.      Mouth/Throat:      Mouth: Mucous membranes are dry.      Pharynx: Oropharynx is clear.   Eyes:      General: No scleral icterus.     Extraocular Movements: Extraocular movements intact.   Cardiovascular:      Rate and Rhythm: Normal rate and regular rhythm.   Pulmonary:      Effort: Pulmonary effort is normal. No respiratory distress.      Breath sounds: No wheezing.   Abdominal:      General: Abdomen is flat. There is no distension.      Palpations: Abdomen is soft.      Tenderness:  There is no abdominal tenderness.   Musculoskeletal:      Right lower leg: No edema.      Left lower leg: No edema.   Skin:     General: Skin is warm and dry.      Coloration: Skin is pale.      Findings: Laceration present.      Comments: Laceration on R side of scalp and L tib/fib lesion wrapped up and bandage   Neurological:      Mental Status: She is alert.      Motor: Atrophy present.      Comments: Oriented to name, situation, and place.              Significant Labs: All pertinent labs within the past 24 hours have been reviewed.  BMP:   Recent Labs   Lab 04/11/24  0829 04/12/24  0326 04/12/24  1315   GLU 85   < > 99   *   < > 128*   K 4.3   < > 4.4      < > 98   CO2 22*   < > 25   BUN 27   < > 23   CREATININE 0.8   < > 0.8   CALCIUM 8.4*   < > 8.4*   MG 1.9  --   --     < > = values in this interval not displayed.       Significant Imaging: I have reviewed all pertinent imaging results/findings within the past 24 hours.

## 2024-04-12 NOTE — PLAN OF CARE
Problem: Adult Inpatient Plan of Care  Goal: Plan of Care Review  Outcome: Ongoing, Progressing  Goal: Patient-Specific Goal (Individualized)  Description: Pt will maintain sbp below 180  Outcome: Ongoing, Progressing  Goal: Absence of Hospital-Acquired Illness or Injury  Outcome: Ongoing, Progressing  Goal: Optimal Comfort and Wellbeing  Outcome: Ongoing, Progressing  Goal: Readiness for Transition of Care  Outcome: Ongoing, Progressing     Problem: Impaired Wound Healing  Goal: Optimal Wound Healing  Outcome: Ongoing, Progressing     Problem: Skin Injury Risk Increased  Goal: Skin Health and Integrity  Outcome: Ongoing, Progressing     Problem: Fall Injury Risk  Goal: Absence of Fall and Fall-Related Injury  Outcome: Ongoing, Progressing             POC updated and reviewed with the patient at bedside. Questions regarding POC were encouraged and addressed. VSS, see flowsheets. Patient is AO x 2 at this time. Patient waxes and wanes. Pain/Nausea management using PRNs, effective. See MAR for medication administration details. Fall/safety precautions maintained. No signs of injury noted over night. Patient  repositioned independently and with assistance in bed for comfort with bed locked in low position, side rails up x 3, bed alarm set, and call light within reach. Instructed patient to call staff for mobility, verbalized understanding. No acute signs of distress noted at this time.

## 2024-04-12 NOTE — PLAN OF CARE
NURSING HOME ORDERS    04/15/2024  [facility]  [facility] (H[facility])  [address]  [place] [address]  Dept: [phone]  Loc: [phone]     Admit to Nursing Home:  [facility]     Diagnoses:  Active Hospital Problems    Diagnosis  POA    Bacteremia [R78.81]  Yes    Macrocytic anemia [D53.9]  Yes    Hallucination [R44.3]  Yes     Likely due to UTI      HTN (hypertension) [I10]  Yes    UTI (urinary tract infection) [N39.0]  Yes    Hyponatremia [E87.1]  Yes     POA, Na 134  BMP daily       Dementia with behavioral disturbance [F03.918]  Yes    Supraventricular tachycardia [I47.10]  Yes    Stage 3 chronic kidney disease [N18.30]  Yes      Resolved Hospital Problems    Diagnosis Date Resolved POA    *Encephalopathy, metabolic [G93.41] 04/14/2024 Yes     presented  from her nursing facility with altered mental status and  recent falls. Nursing facility reported recent visual hallucinations and delusions. In the emergency department she was afebrile.   Found to have serum leukocytosis to 15.9 K with neutrophil predominance.   Urinalysis with greater than 100 wbc's per HPF.   Patient given doses of vancomycin and ceftriaxone       Sepsis due to Streptococcus species [A40.9] 04/12/2024 Yes    History of skin cancer [Z85.828] 04/14/2024 Not Applicable    Delirium [R41.0] 04/14/2024 Yes     Confused likely due to UTI         Patient is homebound due to:  Encephalopathy, metabolic    Allergies:Review of patient's allergies indicates:  No Known Allergies    Vitals:  Routine    Diet: regular diet    Activities:   Activity as tolerated    Goals of Care Treatment Preferences:  Code Status: DNR          What is most important right now is to focus on remaining as independent as possible.  Accordingly, we have decided that the best plan to meet the patient's goals includes continuing with treatment.      Labs:  Per facility    Nursing Precautions:  Fall  and Pressure ulcer prevention    Consults:   OT to evaluate and treat- 3 times a week, Wound Care, and Nutrition to evaluate and recommend diet     Miscellaneous Care: N/A           Diabetes Care:  N/A      Medications: Discontinue all previous medication orders, if any. See new list below.     Medication List        START taking these medications      amoxicillin-clavulanate 500-125mg 500-125 mg Tab  Commonly known as: AUGMENTIN  Take 1 tablet (500 mg total) by mouth every 12 (twelve) hours. for 1 day     lisinopriL 40 MG tablet  Commonly known as: PRINIVIL,ZESTRIL  Take 1 tablet (40 mg total) by mouth once daily.            CONTINUE taking these medications      acetaminophen 500 MG tablet  Commonly known as: TYLENOL  Take 1,000 mg by mouth every 8 (eight) hours as needed for Pain.     meloxicam 7.5 MG tablet  Commonly known as: MOBIC  Take 1 tablet by mouth once daily.     nebivoloL 5 MG Tab  Commonly known as: BYSTOLIC  Take 1 tablet by mouth once daily.     VITAMIN D2 50,000 unit Cap  Generic drug: ergocalciferol  Take 50,000 Units by mouth every 7 days. (FRIDAY)                Immunizations Administered as of 4/15/2024       Name Date Dose VIS Date Route Exp Date    COVID-19, MRNA, LN-S, PF (Moderna) 11/2/2021 -- -- -- --    : Modern[employer]     Lot: [id number]     External: Auto Reconciled From Outside Source     Comment: [employer]iss[employer]ou     COVID-19, MRNA, LN-S, PF (Moderna) 2/5/2021 -- -- -- --    : Moderna [employer]     Lot: [id number]     External: Auto Reconciled From Outside Source     Comment: Aissatou     COVID-19, MRNA, LN-S, PF (Moderna) 1/8/2021 -- -- -- --    : Moderna [employer]     Lot: [id number]     External: Auto Reconciled From Outside Source     Comment: Adminis             This patient has had both covid vaccinations    Some patients may experience side effects after vaccination.  These may include fever, headache, muscle or joint aches.  Most symptoms resolve with 24-48  hours and do not require urgent medical evaluation unless they persist for more than 72 hours or symptoms are concerning for an unrelated medical condition.          _________________________________  Daniel Lopez MD  04/15/2024

## 2024-04-13 LAB
ANION GAP SERPL CALC-SCNC: 6 MMOL/L (ref 8–16)
BUN SERPL-MCNC: 18 MG/DL (ref 10–30)
CALCIUM SERPL-MCNC: 7.8 MG/DL (ref 8.7–10.5)
CHLORIDE SERPL-SCNC: 100 MMOL/L (ref 95–110)
CO2 SERPL-SCNC: 23 MMOL/L (ref 23–29)
CREAT SERPL-MCNC: 0.8 MG/DL (ref 0.5–1.4)
EST. GFR  (NO RACE VARIABLE): >60 ML/MIN/1.73 M^2
GLUCOSE SERPL-MCNC: 87 MG/DL (ref 70–110)
OSMOLALITY UR: 421 MOSM/KG (ref 50–1200)
POTASSIUM SERPL-SCNC: 4.3 MMOL/L (ref 3.5–5.1)
SODIUM SERPL-SCNC: 129 MMOL/L (ref 136–145)
SODIUM UR-SCNC: 107 MMOL/L (ref 20–250)

## 2024-04-13 PROCEDURE — 11000001 HC ACUTE MED/SURG PRIVATE ROOM

## 2024-04-13 PROCEDURE — 63600175 PHARM REV CODE 636 W HCPCS

## 2024-04-13 PROCEDURE — 36415 COLL VENOUS BLD VENIPUNCTURE: CPT

## 2024-04-13 PROCEDURE — 25000003 PHARM REV CODE 250

## 2024-04-13 PROCEDURE — 25000003 PHARM REV CODE 250: Performed by: STUDENT IN AN ORGANIZED HEALTH CARE EDUCATION/TRAINING PROGRAM

## 2024-04-13 PROCEDURE — 80048 BASIC METABOLIC PNL TOTAL CA: CPT

## 2024-04-13 RX ORDER — HALOPERIDOL 5 MG/ML
2 INJECTION INTRAMUSCULAR NIGHTLY PRN
Status: DISCONTINUED | OUTPATIENT
Start: 2024-04-13 | End: 2024-04-15 | Stop reason: HOSPADM

## 2024-04-13 RX ORDER — SODIUM CHLORIDE 1 G/1
1000 TABLET ORAL DAILY
Status: DISCONTINUED | OUTPATIENT
Start: 2024-04-13 | End: 2024-04-15 | Stop reason: HOSPADM

## 2024-04-13 RX ORDER — SODIUM CHLORIDE 9 MG/ML
INJECTION, SOLUTION INTRAVENOUS CONTINUOUS
Status: ACTIVE | OUTPATIENT
Start: 2024-04-13 | End: 2024-04-14

## 2024-04-13 RX ADMIN — LISINOPRIL 40 MG: 20 TABLET ORAL at 10:04

## 2024-04-13 RX ADMIN — SODIUM CHLORIDE 1000 MG: 1 TABLET ORAL at 04:04

## 2024-04-13 RX ADMIN — HEPARIN SODIUM 5000 UNITS: 5000 INJECTION INTRAVENOUS; SUBCUTANEOUS at 04:04

## 2024-04-13 RX ADMIN — THERA TABS 1 TABLET: TAB at 10:04

## 2024-04-13 RX ADMIN — SODIUM CHLORIDE: 9 INJECTION, SOLUTION INTRAVENOUS at 10:04

## 2024-04-13 RX ADMIN — HEPARIN SODIUM 5000 UNITS: 5000 INJECTION INTRAVENOUS; SUBCUTANEOUS at 06:04

## 2024-04-13 RX ADMIN — HYPROMELLOSE 2910 1 DROP: 5 SOLUTION/ DROPS OPHTHALMIC at 04:04

## 2024-04-13 RX ADMIN — METOPROLOL SUCCINATE 25 MG: 25 TABLET, EXTENDED RELEASE ORAL at 09:04

## 2024-04-13 RX ADMIN — AMOXICILLIN AND CLAVULANATE POTASSIUM 500 MG: 500; 125 TABLET, FILM COATED ORAL at 09:04

## 2024-04-13 RX ADMIN — HYPROMELLOSE 2910 1 DROP: 5 SOLUTION/ DROPS OPHTHALMIC at 09:04

## 2024-04-13 RX ADMIN — HEPARIN SODIUM 5000 UNITS: 5000 INJECTION INTRAVENOUS; SUBCUTANEOUS at 10:04

## 2024-04-13 RX ADMIN — SODIUM CHLORIDE: 9 INJECTION, SOLUTION INTRAVENOUS at 11:04

## 2024-04-13 RX ADMIN — METOPROLOL SUCCINATE 25 MG: 25 TABLET, EXTENDED RELEASE ORAL at 10:04

## 2024-04-13 RX ADMIN — AMOXICILLIN AND CLAVULANATE POTASSIUM 500 MG: 500; 125 TABLET, FILM COATED ORAL at 10:04

## 2024-04-13 RX ADMIN — SODIUM CHLORIDE: 9 INJECTION, SOLUTION INTRAVENOUS at 05:04

## 2024-04-13 NOTE — PLAN OF CARE
Problem: Adult Inpatient Plan of Care  Goal: Plan of Care Review  Outcome: Ongoing, Progressing  Goal: Patient-Specific Goal (Individualized)  Description: Pt will maintain sbp below 180  Outcome: Ongoing, Progressing  Goal: Absence of Hospital-Acquired Illness or Injury  Outcome: Ongoing, Progressing  Goal: Optimal Comfort and Wellbeing  Outcome: Ongoing, Progressing  Goal: Readiness for Transition of Care  Outcome: Ongoing, Progressing     Problem: Impaired Wound Healing  Goal: Optimal Wound Healing  Outcome: Ongoing, Progressing     Problem: Skin Injury Risk Increased  Goal: Skin Health and Integrity  Outcome: Ongoing, Progressing     Problem: Fall Injury Risk  Goal: Absence of Fall and Fall-Related Injury  Outcome: Ongoing, Progressing       POC reviewed and updated with the patient at the bedside. Questions regarding POC were encouraged and addressed. VSS, see flowsheets. Patient is AOx 2 self and situation at this time. Fall and safety precautions maintained, no signs of injury noted during shift.  Patient repositioned independently/ with assistance for comfort with bed locked in low position, side rails up x 2, bed alarm on, with call light within reach. Instructed patient to call staff for mobility, verbalized understanding. No acute signs of distress noted at this time.

## 2024-04-13 NOTE — PLAN OF CARE
Patient has discharge orders signed and is medically ready for discharge. JARED reached out to Gali-admissions coordinator at [facility], no answer. SW called [facility]  main line, charge nurse stated patient would have to come back Monday as there is no staff to be able to complete re-admit paperwork at the nursing home. JARED completed avoidable days.     ROGER Chapa  [facility] [place]  [phone]

## 2024-04-13 NOTE — ASSESSMENT & PLAN NOTE
89-year-old woman with slowly worsening hyponatremia during admission.  Patient was started on hydrochlorothiazide for blood pressure management, but was noted to have a drop in sodium.  The medication was discontinued, but the half-life can range from 6-15 hours and will take 4-5 half lives to remove from the body, so there may be an effect for up to 75 hours.  She has also had poor oral intake due to her improving infections.  Due to her head trauma there was some suspicion for possible SIADH, but on 04/11 IV fluids were withheld and her sodium continued to decline, and on for 12 when she was given a small bolus of isotonic normal saline, her sodium level improved.  We suspect this is likely due to a combination of her age, poor oral intake, and hypovolemic hyponatremia.    Daily BMP  Level 6 diet ordered per SLP. Thin liquids, but no straws.  D/C HCTZ  Fluid resuscitation with improvement in sodium.  Assistance with meals  Start 1g salt tablet

## 2024-04-13 NOTE — NURSING
Nurses Note -- 4 Eyes      4/13/2024   2:44 PM      Skin assessed during: Q Shift Change      [] No Altered Skin Integrity Present    []Prevention Measures Documented      [x] Yes- Altered Skin Integrity Present or Discovered   [] LDA Added if Not in Epic (Describe Wound)   [] New Altered Skin Integrity was Present on Admit and Documented in LDA   [] Wound Image Taken    Wound Care Consulted? No    Attending Nurse:  Mariela Kruse RN/Staff Member:  [place]      Laceration to R scalp, finger, elbow and leg  Scattered bruising

## 2024-04-13 NOTE — ASSESSMENT & PLAN NOTE
Chronic, uncontrolled. Latest blood pressure and vitals reviewed-     Temp:  [96.7 °F (35.9 °C)-99.1 °F (37.3 °C)]   Pulse:  [62-75]   Resp:  [16-18]   BP: (128-175)/(63-79)   SpO2:  [92 %-97 %] .   Home meds for hypertension were reviewed and noted below.   Hypertension Medications               lisinopriL (PRINIVIL,ZESTRIL) 40 MG tablet Take 40 mg by mouth once daily.    metoprolol succinate (TOPROL-XL) 25 MG 24 hr tablet Take 25 mg by mouth once daily.            Will utilize p.r.n. blood pressure medication only if patient's blood pressure greater than 180/110 and she develops symptoms such as worsening chest pain or shortness of breath.  Lisinopril 40mg  Metoprolol 25mg BID (nebivolol not on formulary)  HCTZ discontinued for hyponatremia.

## 2024-04-13 NOTE — SUBJECTIVE & OBJECTIVE
Interval History:  Patient experienced some agitation overnight that was non redirectable, and received a single dose of haloperidol.  She is alert and at baseline this morning, and was seen with her daughter and caretaker.  Sodium levels improved to 129 from 127 with the administration of IV fluids.  Stitches removed from left lower extremity yesterday.  She is stable for discharge, but her nursing home does not have administration to accept her back.  We will work towards discharge on Monday.      Objective:     Vital Signs (Most Recent):  Temp: 99.1 °F (37.3 °C) (04/13/24 1227)  Pulse: 66 (04/13/24 1227)  Resp: 18 (04/13/24 1227)  BP: (!) 153/65 (04/13/24 1227)  SpO2: 96 % (04/13/24 1227) Vital Signs (24h Range):  Temp:  [96.7 °F (35.9 °C)-99.1 °F (37.3 °C)] 99.1 °F (37.3 °C)  Pulse:  [62-75] 66  Resp:  [16-18] 18  SpO2:  [92 %-97 %] 96 %  BP: (128-175)/(63-79) 153/65     Weight: 54.4 kg (120 lb)  Body mass index is 19.97 kg/m².    Intake/Output Summary (Last 24 hours) at 4/13/2024 1246  Last data filed at 4/13/2024 0426  Gross per 24 hour   Intake 1490.7 ml   Output 400 ml   Net 1090.7 ml         Physical Exam  Vitals and nursing note reviewed.   HENT:      Head: Normocephalic. Laceration present.      Comments: Large scalp wound, sutured up on R side of head, bruising around the sutures.      Nose: No congestion or rhinorrhea.      Mouth/Throat:      Mouth: Mucous membranes are dry.      Pharynx: Oropharynx is clear.   Eyes:      General: No scleral icterus.     Extraocular Movements: Extraocular movements intact.   Cardiovascular:      Rate and Rhythm: Normal rate and regular rhythm.   Pulmonary:      Effort: Pulmonary effort is normal. No respiratory distress.      Breath sounds: No wheezing.   Abdominal:      General: Abdomen is flat. There is no distension.      Palpations: Abdomen is soft.      Tenderness: There is no abdominal tenderness.   Musculoskeletal:      Right lower leg: No edema.      Left lower  leg: No edema.   Skin:     General: Skin is warm and dry.      Coloration: Skin is pale.      Findings: Laceration present.      Comments: Laceration on R side of scalp and L tib/fib lesion wrapped up and bandage   Neurological:      Mental Status: She is alert.      Motor: Atrophy present.      Comments: Oriented to name, situation, and place.              Significant Labs: All pertinent labs within the past 24 hours have been reviewed.    BMP:   Recent Labs   Lab 04/12/24  0326 04/12/24  1315 04/13/24  0525   * 128* 129*   K 4.6 4.4 4.3   CL 98 98 100   CO2 21* 25 23   GLU 93 99 87   BUN 30 23 18   CREATININE 0.9 0.8 0.8   CALCIUM 8.6* 8.4* 7.8*   ANIONGAP 8 5* 6*       Significant Imaging: I have reviewed all pertinent imaging results/findings within the past 24 hours.

## 2024-04-13 NOTE — NURSING
Nurses Note -- 4 Eyes      4/12/2024   7:08 PM      Skin assessed during: Q Shift Change      [] No Altered Skin Integrity Present    [x]Prevention Measures Documented      [x] Yes- Altered Skin Integrity Present or Discovered   [] LDA Added if Not in Epic (Describe Wound)   [x] New Altered Skin Integrity was Present on Admit and Documented in LDA   [x] Wound Image Taken    Wound Care Consulted? Yes    Attending Nurse:  Odilia Kruse RN/Staff Member:  NUPUR        MULTIPLE AREAS OF ALTERED SKIN INTEGRITY NOTED, SEE LDA FLOWSHEETS FOR DETAILS. WOUND CARE HAS SEEN PATIENT.

## 2024-04-13 NOTE — NURSING
"Nursing staff sitting near patient's room overheard patient having a conversation with someone. Upon entering patient's room to ensure everything was okay, patient was alone in her room and lying in bed comfortably having a conversation. When asking the patient who she was speaking to, the patient stated she was talking the the "woman" over in the corner of her room. Reassured patient that the only people in her room at that time were her and the primary bedside nurse and that there wasn't a woman in the corner of her room. Patient continued to insist  there was a woman in the corner of her room. Patient then asked when she would get her nightly scheduled medications. Notified patient that medications were scheduled for 2100 and that they would be administered shortly, verbalized understanding. The patient became uncooperative with nursing staff while attempting to conduct patient care and reposition her in bed off the side rail. At that time, patient began telling bedside nurse that the nurse was "evil" and that nurse was "going to nursing home." Asked patient why she was calling bedside nursing staff "evil." Patient then replied, "I don't know what you did but you did something." At 2040, attempted to administer patient's scheduled medications, see MAR for details. Patient refused all PO medications at that time stating that nursing staff was attempting to poison her. Reassured patient that no one was attempting to poison her and that the medications were her normal night medications. Patient still refused PO medications but did allow nurse to administer scheduled artifical tears. Nursing staff able to reposition patient in bed off side rail/comfort, with bed locked in lowest position, side rails up x4, bed alarm set, and call light within reach. Instructed patient to call nursing staff for assistance, verbalized understanding. No acute signs of distress noted at this time.     Notified [facility] medicine 1 provider on " CRISTY richard MD, of patient's visual hallucinations and refusal of PO medications. Provider placed orders for Haldol 2mg IVP PRN for agitation. See MAR for administration details.

## 2024-04-13 NOTE — PROGRESS NOTES
[facility] - Neurosurgery ([facility])  [facility] Medicine  Progress Note    Patient Name: Jayne Aponte  MRN: [id number]  Patient Class: IP- Inpatient   Admission Date: 4/8/2024  Length of Stay: 4 days  Attending Physician: Sharla Del Angel*  Primary Care Provider: KHANH Corea MD        Subjective:     Principal Problem:Encephalopathy, metabolic        HPI:  89 yof with pmh on care everywhere of COPD, HTN, CKD3, colon cancer 2003, breast cancer 2006, skin cancer, dementia, functional decline, malnutrition presenting with worsening AMS and two falls at her nursing home. Pt had difficulty telling me why she was there. From discussion with ED staff from the nursing home pt has been confused and hallucinating for 2 days now, no known fevers. Fall was not witnessed but does not think she LOC. Pt does not know how she fell. Pt asked for how she was going to get a taxi during interview. Pt's paper work has that patient is a DNR    In the ED: CT head/neck negative for bleeds or fracture, glucose was WNL, Urine was positive for UTI. No prior resistant cultures, but higher risk coming from nursing home.     Overview/Hospital Course:  Ms. Aponte is a 89-year-old female with dementia and reported functional decline who presented with altered mental status and two falls at her nursing home resulting in a right head laceration. Due to her falls and altered mentation she receive a CT Head which showed no acute intracranial processes and CT cervical spine which showed no fractures. She was also hypertensive on arrival, and was given lisinopril, HCTZ, and metoprolol succinate. Upon admission, she was found to have an E. Coli UTI and was started on IV ceftriaxone. Her blood cultures were positive for streptococcus and she was started on vancomycin while awaiting for sensitivity data. During her stay, she developed mild hyponatremia, so HCTZ was discontinued.  We suspect some of her hyponatremia due to lingering effect of  hydrochlorothiazide, possibly from her, but her sodium decreased when she was not given IV fluids, lower suspicion for SIADH.  Her mentation improved while being treated with antibiotics. She was noted to have a mild macrocytic anemia, so B12 and folate levels were ordered but were found to be within normal limits.  After her blood cultures were negative for 48 hours, and the urine culture sensitivities resulted, the patient was transitioned to oral Augmentin after receiving 4 days of IV ceftriaxone.           Interval History:  Patient experienced some agitation overnight that was non redirectable, and received a single dose of haloperidol.  She is alert and at baseline this morning, and was seen with her daughter and caretaker.  Sodium levels improved to 129 from 127 with the administration of IV fluids.  Stitches removed from left lower extremity yesterday.  She is stable for discharge, but her nursing home does not have administration to accept her back.  We will work towards discharge on Monday.      Objective:     Vital Signs (Most Recent):  Temp: 99.1 °F (37.3 °C) (04/13/24 1227)  Pulse: 66 (04/13/24 1227)  Resp: 18 (04/13/24 1227)  BP: (!) 153/65 (04/13/24 1227)  SpO2: 96 % (04/13/24 1227) Vital Signs (24h Range):  Temp:  [96.7 °F (35.9 °C)-99.1 °F (37.3 °C)] 99.1 °F (37.3 °C)  Pulse:  [62-75] 66  Resp:  [16-18] 18  SpO2:  [92 %-97 %] 96 %  BP: (128-175)/(63-79) 153/65     Weight: 54.4 kg (120 lb)  Body mass index is 19.97 kg/m².    Intake/Output Summary (Last 24 hours) at 4/13/2024 1246  Last data filed at 4/13/2024 0426  Gross per 24 hour   Intake 1490.7 ml   Output 400 ml   Net 1090.7 ml         Physical Exam  Vitals and nursing note reviewed.   HENT:      Head: Normocephalic. Laceration present.      Comments: Large scalp wound, sutured up on R side of head, bruising around the sutures.      Nose: No congestion or rhinorrhea.      Mouth/Throat:      Mouth: Mucous membranes are dry.      Pharynx:  Oropharynx is clear.   Eyes:      General: No scleral icterus.     Extraocular Movements: Extraocular movements intact.   Cardiovascular:      Rate and Rhythm: Normal rate and regular rhythm.   Pulmonary:      Effort: Pulmonary effort is normal. No respiratory distress.      Breath sounds: No wheezing.   Abdominal:      General: Abdomen is flat. There is no distension.      Palpations: Abdomen is soft.      Tenderness: There is no abdominal tenderness.   Musculoskeletal:      Right lower leg: No edema.      Left lower leg: No edema.   Skin:     General: Skin is warm and dry.      Coloration: Skin is pale.      Findings: Laceration present.      Comments: Laceration on R side of scalp and L tib/fib lesion wrapped up and bandage   Neurological:      Mental Status: She is alert.      Motor: Atrophy present.      Comments: Oriented to name, situation, and place.              Significant Labs: All pertinent labs within the past 24 hours have been reviewed.    BMP:   Recent Labs   Lab 04/12/24  0326 04/12/24  1315 04/13/24  0525   * 128* 129*   K 4.6 4.4 4.3   CL 98 98 100   CO2 21* 25 23   GLU 93 99 87   BUN 30 23 18   CREATININE 0.9 0.8 0.8   CALCIUM 8.6* 8.4* 7.8*   ANIONGAP 8 5* 6*       Significant Imaging: I have reviewed all pertinent imaging results/findings within the past 24 hours.    Assessment/Plan:      * Encephalopathy, metabolic  IMPROVING    89 F presenting from nursing facility with altered mental status and and 2 recent falls. Nursing facility reported recent visual hallucinations and delusions. In the emergency department she was afebrile. Found to have serum leukocytosis to 15.9 K with neutrophil predominance. Urinalysis with greater than 100 wbc's per HPF. Patient given doses of vancomycin and ceftriaxone in the ED. Thought to be secondary to bacteremia and UTI.    Ceftriaxone 2g daily for UTI, vancomycin per pharmacy for bacteremia  Fall precautions  Video monitoring  Delirium precautions, 8am  lab draws      Macrocytic anemia  Patient's anemia is currently controlled. Has not received any PRBCs to date. Etiology likely d/t nutritional deficiency. MCV 99  Current CBC reviewed-   Lab Results   Component Value Date    HGB 11.8 (L) 04/10/2024    HCT 37.0 04/10/2024     Monitor serial CBC and transfuse if patient becomes hemodynamically unstable, symptomatic or H/H drops below 7/21.  B12 and Folate levels ordered    Bacteremia  89F presenting with AMS and falls resulting in laceration. Blood cultures drawn on admission. Both cultures positive for Streptococcus species and were suspected to be contaminants.     Repeat blood cultures negative for 48 hours  Patient placed on vancomycin, pending sensitivities      Delirium  Likely secondary to age, baseline dementia, and UTI. See UTI (urinary tract infection) and Encephalopathy, metabolic       Hallucination  Likely secondary to UTI. See UTI (urinary tract infection) and Encephalopathy, metabolic     History of skin cancer  89F with history of basal cell carcinoma.       Hyponatremia  89-year-old woman with slowly worsening hyponatremia during admission.  Patient was started on hydrochlorothiazide for blood pressure management, but was noted to have a drop in sodium.  The medication was discontinued, but the half-life can range from 6-15 hours and will take 4-5 half lives to remove from the body, so there may be an effect for up to 75 hours.  She has also had poor oral intake due to her improving infections.  Due to her head trauma there was some suspicion for possible SIADH, but on 04/11 IV fluids were withheld and her sodium continued to decline, and on for 12 when she was given a small bolus of isotonic normal saline, her sodium level improved.  We suspect this is likely due to a combination of her age, poor oral intake, and hypovolemic hyponatremia.    Daily BMP  Level 6 diet ordered per SLP. Thin liquids, but no straws.  D/C HCTZ  Fluid resuscitation with  improvement in sodium.  Assistance with meals  Start 1g salt tablet      UTI (urinary tract infection)  Pt presenting with confusion, leukocytosis, UA was consistent with a urinary tract infection. Pt chart does not have any resistant cultures, and patient is unaware of any. With her at a nursing home there is risk for moire resistant bugs, but vital signs have been stable. Lactic acid was negative. Patient's mentation is improving after two days of IV ceftriaxone.    Plan  Transition to oral Augmentin to complete 7 day course  Follow urine culture. Positive for E. Coli       HTN (hypertension)  Chronic, uncontrolled. Latest blood pressure and vitals reviewed-     Temp:  [96.7 °F (35.9 °C)-99.1 °F (37.3 °C)]   Pulse:  [62-75]   Resp:  [16-18]   BP: (128-175)/(63-79)   SpO2:  [92 %-97 %] .   Home meds for hypertension were reviewed and noted below.   Hypertension Medications               lisinopriL (PRINIVIL,ZESTRIL) 40 MG tablet Take 40 mg by mouth once daily.    metoprolol succinate (TOPROL-XL) 25 MG 24 hr tablet Take 25 mg by mouth once daily.            Will utilize p.r.n. blood pressure medication only if patient's blood pressure greater than 180/110 and she develops symptoms such as worsening chest pain or shortness of breath.  Lisinopril 40mg  Metoprolol 25mg BID (nebivolol not on formulary)  HCTZ discontinued for hyponatremia.         Supraventricular tachycardia  Difficult to find her records, but from care everywhere patient was recently taking metoprolol 25mg BID, but was recently transitioned to nebivolol 5mg for SVT.    Metoprolol 25mg BID      Stage 3 chronic kidney disease  Creatine stable for now. BMP reviewed- noted Estimated Creatinine Clearance: 29.7 mL/min (based on SCr of 0.8 mg/dL). according to latest data. Based on current GFR, CKD stage is stage 3 - GFR 30-59.  Monitor UOP and serial BMP and adjust therapy as needed. Renally dose meds. Avoid nephrotoxic medications and  procedures.    Cystatin ordered to assess renal function while patient is on vancomycin        Hyperlipidemia        Dementia with behavioral disturbance  Pt has some baseline dementia, but for the last few days has been experiencing worsening symptoms of hallucinations and worsening confusion. Pt found to have a UTI. Most likely causing the worsening confusion      VTE Risk Mitigation (From admission, onward)           Ordered     heparin (porcine) injection 5,000 Units  Every 8 hours         04/09/24 1134     Reason for No Pharmacological VTE Prophylaxis  Once        Question:  Reasons:  Answer:  Active Bleeding    04/09/24 0500     IP VTE HIGH RISK PATIENT  Once         04/09/24 0500     Place sequential compression device  Until discontinued         04/09/24 0500                    Discharge Planning   ASHLEY: 4/15/2024     Code Status: DNR   Is the patient medically ready for discharge?: Yes    Reason for patient still in hospital (select all that apply): Pending disposition  Discharge Plan A: Return to nursing home   Discharge Delays: (!) Change in Medical Condition      Daniel Lopez MD  Department of Hospital Medicine   [facility] - Neurosurgery ([facility]

## 2024-04-14 PROBLEM — Z85.828 HISTORY OF SKIN CANCER: Status: RESOLVED | Noted: 2024-04-10 | Resolved: 2024-04-14

## 2024-04-14 PROBLEM — G93.41 ENCEPHALOPATHY, METABOLIC: Status: RESOLVED | Noted: 2024-04-10 | Resolved: 2024-04-14

## 2024-04-14 PROBLEM — R41.0 DELIRIUM: Status: RESOLVED | Noted: 2024-04-10 | Resolved: 2024-04-14

## 2024-04-14 LAB
ANION GAP SERPL CALC-SCNC: 8 MMOL/L (ref 8–16)
BUN SERPL-MCNC: 15 MG/DL (ref 10–30)
CALCIUM SERPL-MCNC: 8.2 MG/DL (ref 8.7–10.5)
CHLORIDE SERPL-SCNC: 103 MMOL/L (ref 95–110)
CO2 SERPL-SCNC: 20 MMOL/L (ref 23–29)
CREAT SERPL-MCNC: 0.7 MG/DL (ref 0.5–1.4)
EST. GFR  (NO RACE VARIABLE): >60 ML/MIN/1.73 M^2
GLUCOSE SERPL-MCNC: 86 MG/DL (ref 70–110)
POTASSIUM SERPL-SCNC: 4.2 MMOL/L (ref 3.5–5.1)
SODIUM SERPL-SCNC: 131 MMOL/L (ref 136–145)

## 2024-04-14 PROCEDURE — 36415 COLL VENOUS BLD VENIPUNCTURE: CPT

## 2024-04-14 PROCEDURE — 25000003 PHARM REV CODE 250

## 2024-04-14 PROCEDURE — 11000001 HC ACUTE MED/SURG PRIVATE ROOM

## 2024-04-14 PROCEDURE — 25000003 PHARM REV CODE 250: Performed by: STUDENT IN AN ORGANIZED HEALTH CARE EDUCATION/TRAINING PROGRAM

## 2024-04-14 PROCEDURE — 94761 N-INVAS EAR/PLS OXIMETRY MLT: CPT

## 2024-04-14 PROCEDURE — 97535 SELF CARE MNGMENT TRAINING: CPT

## 2024-04-14 PROCEDURE — 97116 GAIT TRAINING THERAPY: CPT | Mod: CQ

## 2024-04-14 PROCEDURE — 80048 BASIC METABOLIC PNL TOTAL CA: CPT

## 2024-04-14 PROCEDURE — 97530 THERAPEUTIC ACTIVITIES: CPT | Mod: CQ

## 2024-04-14 PROCEDURE — 63600175 PHARM REV CODE 636 W HCPCS

## 2024-04-14 RX ORDER — DICLOFENAC SODIUM 10 MG/G
2 GEL TOPICAL 3 TIMES DAILY
Status: DISCONTINUED | OUTPATIENT
Start: 2024-04-14 | End: 2024-04-15 | Stop reason: HOSPADM

## 2024-04-14 RX ORDER — ACETAMINOPHEN 325 MG/1
650 TABLET ORAL EVERY 6 HOURS PRN
Status: DISCONTINUED | OUTPATIENT
Start: 2024-04-14 | End: 2024-04-15 | Stop reason: HOSPADM

## 2024-04-14 RX ORDER — ACETAMINOPHEN 325 MG/1
650 TABLET ORAL EVERY 6 HOURS PRN
Status: DISCONTINUED | OUTPATIENT
Start: 2024-04-14 | End: 2024-04-14

## 2024-04-14 RX ADMIN — ACETAMINOPHEN 650 MG: 325 TABLET ORAL at 02:04

## 2024-04-14 RX ADMIN — SODIUM CHLORIDE 1000 MG: 1 TABLET ORAL at 08:04

## 2024-04-14 RX ADMIN — LISINOPRIL 40 MG: 20 TABLET ORAL at 08:04

## 2024-04-14 RX ADMIN — POLYETHYLENE GLYCOL 3350 17 G: 17 POWDER, FOR SOLUTION ORAL at 08:04

## 2024-04-14 RX ADMIN — HEPARIN SODIUM 5000 UNITS: 5000 INJECTION INTRAVENOUS; SUBCUTANEOUS at 09:04

## 2024-04-14 RX ADMIN — METOPROLOL SUCCINATE 25 MG: 25 TABLET, EXTENDED RELEASE ORAL at 08:04

## 2024-04-14 RX ADMIN — HYPROMELLOSE 2910 1 DROP: 5 SOLUTION/ DROPS OPHTHALMIC at 02:04

## 2024-04-14 RX ADMIN — METOPROLOL SUCCINATE 25 MG: 25 TABLET, EXTENDED RELEASE ORAL at 09:04

## 2024-04-14 RX ADMIN — HEPARIN SODIUM 5000 UNITS: 5000 INJECTION INTRAVENOUS; SUBCUTANEOUS at 05:04

## 2024-04-14 RX ADMIN — ACETAMINOPHEN 1000 MG: 500 TABLET ORAL at 08:04

## 2024-04-14 RX ADMIN — HYPROMELLOSE 2910 1 DROP: 5 SOLUTION/ DROPS OPHTHALMIC at 09:04

## 2024-04-14 RX ADMIN — HEPARIN SODIUM 5000 UNITS: 5000 INJECTION INTRAVENOUS; SUBCUTANEOUS at 02:04

## 2024-04-14 RX ADMIN — DICLOFENAC SODIUM 2 G: 10 GEL TOPICAL at 09:04

## 2024-04-14 RX ADMIN — THERA TABS 1 TABLET: TAB at 08:04

## 2024-04-14 RX ADMIN — AMOXICILLIN AND CLAVULANATE POTASSIUM 500 MG: 500; 125 TABLET, FILM COATED ORAL at 11:04

## 2024-04-14 RX ADMIN — HYPROMELLOSE 2910 1 DROP: 5 SOLUTION/ DROPS OPHTHALMIC at 08:04

## 2024-04-14 RX ADMIN — AMOXICILLIN AND CLAVULANATE POTASSIUM 500 MG: 500; 125 TABLET, FILM COATED ORAL at 09:04

## 2024-04-14 NOTE — ASSESSMENT & PLAN NOTE
Likely secondary to age, baseline dementia, and UTI. See UTI (urinary tract infection) and Encephalopathy, metabolic     resolved

## 2024-04-14 NOTE — SUBJECTIVE & OBJECTIVE
Interval History: This AM  complaining of HA and neck pain, on my examionation right sternocleidomastoideus TTP. Tylenol with some relief will monitor closely for out of proportion HA or neurological decline. Will try diclofenac topical.    Review of Systems   Constitutional:  Positive for appetite change (decreased). Negative for activity change, chills, diaphoresis and fever.   HENT:  Negative for nosebleeds and trouble swallowing.    Eyes:  Positive for visual disturbance (hx of macular degenertion). Negative for pain.        Ectropion> right   Respiratory:  Negative for cough, choking and shortness of breath.    Cardiovascular:  Negative for chest pain.   Gastrointestinal:  Negative for abdominal pain.   Genitourinary:  Negative for difficulty urinating.   Musculoskeletal:  Positive for neck pain.   Neurological:  Positive for headaches.   Psychiatric/Behavioral:  Negative for confusion and decreased concentration.      Objective:     Vital Signs (Most Recent):  Temp: 97.6 °F (36.4 °C) (04/14/24 1158)  Pulse: 66 (04/14/24 1158)  Resp: 18 (04/14/24 1158)  BP: 131/62 (04/14/24 1158)  SpO2: (!) 93 % (04/14/24 1158) Vital Signs (24h Range):  Temp:  [97.6 °F (36.4 °C)-98.8 °F (37.1 °C)] 97.6 °F (36.4 °C)  Pulse:  [66-75] 66  Resp:  [15-20] 18  SpO2:  [93 %-95 %] 93 %  BP: (120-176)/(56-76) 131/62     Weight: 54.4 kg (120 lb)  Body mass index is 19.97 kg/m².    Intake/Output Summary (Last 24 hours) at 4/14/2024 1441  Last data filed at 4/14/2024 1257  Gross per 24 hour   Intake 1953.46 ml   Output 600 ml   Net 1353.46 ml         Physical Exam  Vitals and nursing note reviewed.   HENT:      Head: Normocephalic. Laceration present.      Comments: Large scalp wound, sutured up on R side of head, bruising around the sutures.      Nose: No congestion or rhinorrhea.      Mouth/Throat:      Mouth: Mucous membranes are dry.      Pharynx: Oropharynx is clear.   Eyes:      General: No scleral icterus.     Extraocular Movements:  Extraocular movements intact.   Cardiovascular:      Rate and Rhythm: Normal rate and regular rhythm.   Pulmonary:      Effort: Pulmonary effort is normal. No respiratory distress.      Breath sounds: No wheezing.   Abdominal:      General: Abdomen is flat. There is no distension.      Palpations: Abdomen is soft.      Tenderness: There is no abdominal tenderness.   Musculoskeletal:      Right lower leg: No edema.      Left lower leg: No edema.   Skin:     General: Skin is warm and dry.      Coloration: Skin is pale.      Findings: Laceration present.      Comments: Laceration on R side of scalp and L tib/fib lesion wrapped up and bandage   Neurological:      Mental Status: She is alert.      Motor: Atrophy present.      Comments: Oriented to name, situation, and place.    Psychiatric:         Thought Content: Thought content normal.         Judgment: Judgment normal.             Significant Labs: All pertinent labs within the past 24 hours have been reviewed.  Recent Lab Results         04/14/24  0447        Anion Gap 8       BUN 15       Calcium 8.2       Chloride 103       CO2 20       Creatinine 0.7       eGFR >60.0       Glucose 86       Potassium 4.2       Sodium 131               Significant Imaging: I have reviewed all pertinent imaging results/findings within the past 24 hours.

## 2024-04-14 NOTE — ASSESSMENT & PLAN NOTE
Pt has some baseline dementia, but for the last few days has been experiencing worsening symptoms of hallucinations and worsening confusion. Pt found to have a UTI. Most likely causing the worsening confusion    Mentation improving daily, per son.

## 2024-04-14 NOTE — ASSESSMENT & PLAN NOTE
Chronic, uncontrolled. Latest blood pressure and vitals reviewed-     Temp:  [97.6 °F (36.4 °C)-98.8 °F (37.1 °C)]   Pulse:  [66-75]   Resp:  [15-20]   BP: (116-176)/(56-76)   SpO2:  [93 %-95 %] .   Home meds for hypertension were reviewed and noted below.   Hypertension Medications               lisinopriL (PRINIVIL,ZESTRIL) 40 MG tablet Take 40 mg by mouth once daily.    metoprolol succinate (TOPROL-XL) 25 MG 24 hr tablet Take 25 mg by mouth once daily.          Per son not on any home medications        Will utilize p.r.n. blood pressure medication only if patient's blood pressure greater than 180/110 and she develops symptoms such as worsening chest pain or shortness of breath.  Lisinopril 40mg  Metoprolol 25mg BID (nebivolol not on formulary)  HCTZ discontinued for hyponatremia.

## 2024-04-14 NOTE — PLAN OF CARE
Pt engaged fairly in therapy this date, limited by neck pain.     Problem: Occupational Therapy  Goal: Occupational Therapy Goal  Description: Goals to be met by: 5/9/24 (1 month)     Patient will increase functional independence with ADLs by performing:    UE Dressing with SBA  Grooming while standing with SBA  Toileting from toilet with SBA for hygiene and clothing management.   Rolling to Bilateral with Stand-by Assistance.   Supine to sit with Stand-by Assistance.  Step transfer with SBA  Toilet transfer to toilet with SBA  Outcome: Ongoing, Progressing

## 2024-04-14 NOTE — PT/OT/SLP PROGRESS
"Occupational Therapy   Treatment    Name: Jayne Aponte  MRN: [id number]  Admitting Diagnosis:  Encephalopathy, metabolic       Recommendations:     Discharge Recommendations: Low Intensity Therapy  Discharge Equipment Recommendations:  none  Barriers to discharge:  None    Assessment:     Jayne Aponte is a 89 y.o. female with a medical diagnosis of Encephalopathy, metabolic.  She presents with performance deficits affecting function are weakness, impaired endurance, impaired self care skills, impaired functional mobility, gait instability, impaired balance, decreased safety awareness, decreased lower extremity function, decreased upper extremity function, impaired cognition, decreased coordination, impaired coordination, decreased ROM, impaired skin.     Session this date limited 2* 9/10 pain in Right neck.  Pt initially agreeable to therapy, and writing OT prepared pt's bed and the room set up. As patient and writing therapist prepared to move to EOB sitting, pt became tearful and withdrew from any stimulation around the neck area 2* intense pain.  RN present, and MD visited, which increased OT's time in room. Pt agreeable to self care activities and bed positioning to provide improved stability to pt's neck, RN positioned neck with hot packs.  Patient currently demonstrates a need for low intensity therapy on a scheduled basis secondary to a decline in functional status due to injury.      Rehab Prognosis:  Good; patient would benefit from acute skilled OT services to address these deficits and reach maximum level of function.       Plan:     Patient to be seen 3 x/week to address the above listed problems via self-care/home management, therapeutic activities, therapeutic exercises, neuromuscular re-education  Plan of Care Expires: 05/09/24  Plan of Care Reviewed with: patient    Subjective     Chief Complaint: "My neck - the pain is just terrific"   Patient/Family Comments/goals: Get better  Pain/Comfort:  Pain " "Rating 1: 10/10  Location - Side 1: Bilateral ("more on right side")  Location - Orientation 1: generalized  Location 1: neck  Pain Rating Post-Intervention 1:  not rated but tearful and wincing with movement or touch    Objective:     Communicated with: ANA Sierra prior to session.  Patient found HOB elevated and pt leaning to the right side, with telemetry, PureWick (hot packs at neck) upon OT entry to room.    General Precautions: Standard, aspiration, fall    Orthopedic Precautions:N/A  Braces: N/A  Respiratory Status: Room air     Occupational Performance:     Bed Mobility:    Patient completed Rolling/Turning to Left with  maximal assistance  Patient completed Rolling/Turning to Right with maximal assistance  Patient completed Scooting/Bridging with maximal assistance to middle     Functional Mobility/Transfers:  Politely deferred    Activities of Daily Living:  Grooming: maximal assistance wiping face with washcloth, avoiding incision area  Upper Body Dressing: maximal assistance doffing soiled gown/donning fresh gown  Lower Body Dressing: total assistance donning bilateral socks   Toileting: total assistance detaching/reattaching Purewick      [facility] 6 Click ADL: 17    Treatment & Education:  Pt educated on role of OT, POC, and goals for therapy.    POC was dicussed with patient/caregiver, who was included in its development and is in agreement with the identified goals and treatment plan.   Patient and family aware of patient's deficits and therapy progression.   Time provided for therapeutic counseling and discussion of health disposition.   Educated on importance of EOB/OOB mobility, maintaining routine, sitting up in chair, and maximizing independence with ADLs during admission   Pt completed ADLs and functional mobility for treatment session as noted above   Pt/caregiver verbalized understanding and expressed no further concerns/questions.  Updated communication board with level of assist " required      Patient left HOB elevated with all lines intact, call button in reach, bed alarm on, and RN present    GOALS:   Multidisciplinary Problems       Occupational Therapy Goals          Problem: Occupational Therapy    Goal Priority Disciplines Outcome Interventions   Occupational Therapy Goal     OT, PT/OT Ongoing, Progressing    Description: Goals to be met by: 5/9/24 (1 month)     Patient will increase functional independence with ADLs by performing:    UE Dressing with SBA  Grooming while standing with SBA  Toileting from toilet with SBA for hygiene and clothing management.   Rolling to Bilateral with Stand-by Assistance.   Supine to sit with Stand-by Assistance.  Step transfer with SBA  Toilet transfer to toilet with SBA                       Time Tracking:     OT Date of Treatment: 04/14/24  OT Start Time: 0930  OT Stop Time: 0953  OT Total Time (min): 23 min    Billable Minutes:Self Care/Home Management 23    OT/MARIA G: OT     Number of MARIA G visits since last OT visit: 1    4/14/2024

## 2024-04-14 NOTE — PT/OT/SLP PROGRESS
Physical Therapy Treatment    Patient Name:  Jayne Aponte   MRN:  [id number]    Recommendations:     Discharge Recommendations: No Therapy Indicated  Discharge Equipment Recommendations: none  Barriers to discharge: Inaccessible home and Decreased caregiver support    Assessment:     Jayne Aponte is a 89 y.o. female admitted with a medical diagnosis of Encephalopathy, metabolic.  She presents with the following impairments/functional limitations: weakness, impaired endurance, impaired self care skills, impaired functional mobility, gait instability, impaired balance, visual deficits, decreased coordination, pain, decreased ROM, impaired cardiopulmonary response to activity . Patient appeared more compliant and willing to mobilize this afternoon. Patient reported that she doesn't remember that this morning she was in a lot of pain when mobilizing with O.T.. patient with anterior lean, as she started to fatigue during gait training.    Rehab Prognosis: Good and Fair; patient would benefit from acute skilled PT services to address these deficits and reach maximum level of function.    Recent Surgery: * No surgery found *      Plan:     During this hospitalization, patient to be seen 3 x/week to address the identified rehab impairments via gait training, therapeutic activities, therapeutic exercises, neuromuscular re-education and progress toward the following goals:    Plan of Care Expires:  05/09/24    Subjective     Chief Complaint: weakness  Patient/Family Comments/goals: to have more therapy before she goes back home.  Pain/Comfort:  Pain Rating 1: 0/10  Location - Side 1: Right  Location - Orientation 1: lateral  Location 1: neck  Pain Addressed 1: Reposition, Distraction, Cessation of Activity  Pain Rating Post-Intervention 1:  (Did not rate, but reported increased pain level.)      Objective:     Communicated with NSG prior to session.  Patient found HOB elevated with bed alarm, telemetry, PureWick upon PT  entry to room.     General Precautions: Standard, aspiration, fall  Orthopedic Precautions: N/A  Braces: N/A  Respiratory Status: Room air     Functional Mobility:  Bed Mobility:     Rolling Right: minimum assistance  Scooting: minimum assistance  Supine to Sit: minimum assistance  Transfers:     Sit to Stand:  contact guard assistance and minimum assistance with rolling walker  Bed to Chair: contact guard assistance and minimum assistance with  rolling walker  using  Stand Pivot  Gait: 18 ft and 12 ft with RW and min assistance, with chair in tow by PTA and cues to correct posture due to excessive anterior lean.      AM-PAC 6 CLICK MOBILITY  Turning over in bed (including adjusting bedclothes, sheets and blankets)?: 3  Sitting down on and standing up from a chair with arms (e.g., wheelchair, bedside commode, etc.): 3  Moving from lying on back to sitting on the side of the bed?: 3  Moving to and from a bed to a chair (including a wheelchair)?: 3  Need to walk in hospital room?: 3  Climbing 3-5 steps with a railing?: 2  Basic Mobility Total Score: 17       Treatment & Education:  Donned a gown and gripping socks. Patient sat at EOB for a few minutes to prepare for treatment and to allow NSG to disconnect IV pole. Patient educated on the importance of mobility and getting out of bed to bedside chair with staff's assistance only to prevent muscle atrophy and complications.    Patient left up in chair with all lines intact, call button in reach, and chair alarm on..    GOALS:   Multidisciplinary Problems       Physical Therapy Goals          Problem: Physical Therapy    Goal Priority Disciplines Outcome Goal Variances Interventions   Physical Therapy Goal     PT, PT/OT Ongoing, Progressing     Description: Goals to be met by: 24     Patient will increase functional independence with mobility by performin. Supine to sit with Contact Guard Assistance  2. Sit to supine with Contact Guard Assistance  3. Rolling  to Left and Right with Contact Guard Assistance.  4. Sit to stand transfer with Contact Guard Assistance  5. Bed to chair transfer with Contact Guard Assistance using LRAD  6. Gait  x 25 feet with Contact Guard Assistance using LRAD.   7. Lower extremity exercise program x15 reps per handout, with assistance as needed                         Time Tracking:     PT Received On: 04/14/24  PT Start Time: 1445     PT Stop Time: 1513  PT Total Time (min): 28 min     Billable Minutes: Gait Training 10 and Therapeutic Activity 18    Treatment Type: Treatment  PT/PTA: PTA     Number of PTA visits since last PT visit: 1 04/14/2024

## 2024-04-14 NOTE — ASSESSMENT & PLAN NOTE
89F presenting with AMS and falls resulting in laceration. Blood cultures drawn on admission. Both cultures positive for Streptococcus species and were suspected to be contaminants.     Repeat blood cultures negative for 48 hours  Patient placed on vancomycin, pending sensitivities      Microbiology Results (last 7 days)       Procedure Component Value Units Date/Time    Blood culture [[id number]] Collected: 04/10/24 1006    Order Status: Completed Specimen: Blood Updated: 04/14/24 1212     Blood Culture, Routine No Growth to date      No Growth to date      No Growth to date      No Growth to date      No Growth to date    Blood culture [[id number]] Collected: 04/10/24 1011    Order Status: Completed Specimen: Blood from Peripheral, Hand, Right Updated: 04/14/24 1212     Blood Culture, Routine No Growth to date      No Growth to date      No Growth to date      No Growth to date      No Growth to date    Blood culture [[id number]] Collected: 04/11/24 0829    Order Status: Completed Specimen: Blood Updated: 04/14/24 1012     Blood Culture, Routine No Growth to date      No Growth to date      No Growth to date      No Growth to date    Narrative:      Lft hand    Blood culture [[id number]] Collected: 04/11/24 0829    Order Status: Completed Specimen: Blood Updated: 04/14/24 1012     Blood Culture, Routine No Growth to date      No Growth to date      No Growth to date      No Growth to date    Narrative:      Rt wrist    Urine culture [[id number]]  (Abnormal)  (Susceptibility) Collected: 04/09/24 0301    Order Status: Completed Specimen: Urine Updated: 04/12/24 1352     Urine Culture, Routine ESCHERICHIA COLI  >100,000 cfu/ml      Narrative:      Specimen Source->Urine    Blood culture [[id number]]  (Abnormal) Collected: 04/09/24 0504    Order Status: Completed Specimen: Blood from Peripheral, Antecubital, Left Updated: 04/12/24 0850     Blood Culture, Routine Gram stain karthik bottle: Gram positive cocci in chains  resembling Strep      Results called to and read back by: Gema Dodge RN 04/10/2024  09:21      STREPTOCOCCUS MITIS/ORALIS GROUP  Organism is a probable contaminant      Blood culture [[id number]]  (Abnormal) Collected: 04/09/24 0504    Order Status: Completed Specimen: Blood from Peripheral, Antecubital, Right Updated: 04/12/24 0850     Blood Culture, Routine Gram stain karthik bottle: Gram positive cocci in chains resembling Strep      Positive results previously called 04/10/2024  11:42      STREPTOCOCCUS GORDONII  Organism is a probable contaminant      Rapid Organism ID by PCR (from Blood culture) [[id number]]  (Abnormal) Collected: 04/09/24 0504    Order Status: Completed Updated: 04/10/24 1038     Enterococcus faecalis Not Detected     Enterococcus faecium Not Detected     Listeria monocytogenes Not Detected     Staphylococcus spp. Not Detected     Staphylococcus aureus Not Detected     Staphylococcus epidermidis Not Detected     Staphylococcus lugdunensis Not Detected     Streptococcus species Detected     Streptococcus agalactiae Not Detected     Streptococcus pneumoniae Not Detected     Streptococcus pyogenes Not Detected     Acinetobacter calcoaceticus/baumannii complex Not Detected     Bacteroides fragilis Not Detected     Enterobacterales Not Detected     Enterobacter cloacae complex Not Detected     Escherichia coli Not Detected     Klebsiella aerogenes Not Detected     Klebsiella oxytoca Not Detected     Klebsiella pneumoniae group Not Detected     Proteus Not Detected     Salmonella sp Not Detected     Serratia marcescens Not Detected     Haemophilus influenzae Not Detected     Neisseria meningtidis Not Detected     Pseudomonas aeruginosa Not Detected     Stenotrophomonas maltophilia Not Detected     Candida albicans Not Detected     Candida auris Not Detected     Candida glabrata Not Detected     Candida krusei Not Detected     Candida parapsilosis Not Detected     Candida tropicalis Not Detected      Cryptococcus neoformans/gattii Not Detected     CTX-M (ESBL ) Test Not Applicable     IMP (Carbapenem resistant) Test Not Applicable     KPC resistance gene (Carbapenem resistant) Test Not Applicable     mcr-1  Test Not Applicable     mec A/C  Test Not Applicable     mec A/C and MREJ (MRSA) gene Test Not Applicable     NDM (Carbapenem resistant) Test Not Applicable     OXA-48-like (Carbapenem resistant) Test Not Applicable     van A/B (VRE gene) Test Not Applicable     VIM (Carbapenem resistant) Test Not Applicable

## 2024-04-14 NOTE — NURSING
Nurses Note -- 4 Eyes      4/13/2024   7:27 PM      Skin assessed during: Q Shift Change      [] No Altered Skin Integrity Present    [x]Prevention Measures Documented      [x] Yes- Altered Skin Integrity Present or Discovered   [] LDA Added if Not in Epic (Describe Wound)   [x] New Altered Skin Integrity was Present on Admit and Documented in LDA   [x] Wound Image Taken    Wound Care Consulted? Yes    Attending Nurse:  Odilia Kruse RN/Staff Member:  JENNA        MULTIPLE AREAS OF ALTERED SKIN INTEGRITY NOTED, SEE LDA FLOWSHEETS FOR DETAILS. WOUND CARE HAS SEEN PATIENT.

## 2024-04-14 NOTE — PROGRESS NOTES
Kirit Vargas - Neurosurgery ([facility])  [facility] Medicine  Progress Note    Patient Name: Jayne Aponte  MRN: [id number]  Patient Class: IP- Inpatient   Admission Date: 4/8/2024  Length of Stay: 5 days  Attending Physician: Sharla Del Angel*  Primary Care Provider: KHANH Corea MD        Subjective:     Principal Problem:Encephalopathy, metabolic        HPI:  89 yof with pmh on care everywhere of COPD, HTN, CKD3, colon cancer 2003, breast cancer 2006, skin cancer, dementia, functional decline, malnutrition presenting with worsening AMS and two falls at her nursing home. Pt had difficulty telling me why she was there. From discussion with ED staff from the nursing home pt has been confused and hallucinating for 2 days now, no known fevers. Fall was not witnessed but does not think she LOC. Pt does not know how she fell. Pt asked for how she was going to get a taxi during interview. Pt's paper work has that patient is a DNR    In the ED: CT head/neck negative for bleeds or fracture, glucose was WNL, Urine was positive for UTI. No prior resistant cultures, but higher risk coming from nursing home.     Overview/Hospital Course:  Ms. Aponte is a 89-year-old female with dementia and reported functional decline who presented with altered mental status and two falls at her nursing home resulting in a right head laceration. Due to her falls and altered mentation she receive a CT Head which showed no acute intracranial processes and CT cervical spine which showed no fractures. She was also hypertensive on arrival, and was given lisinopril, HCTZ, and metoprolol succinate. Upon admission, she was found to have an E. Coli UTI and was started on IV ceftriaxone. Her blood cultures were positive for streptococcus and she was started on vancomycin while awaiting for sensitivity data. During her stay, she developed mild hyponatremia, so HCTZ was discontinued.  We suspect some of her hyponatremia due to lingering effect of  hydrochlorothiazide, possibly from her, but her sodium decreased when she was not given IV fluids, lower suspicion for SIADH.  Her mentation improved while being treated with antibiotics. She was noted to have a mild macrocytic anemia, so B12 and folate levels were ordered but were found to be within normal limits.  After her blood cultures were negative for 48 hours, and the urine culture sensitivities resulted, the patient was transitioned to oral Augmentin after receiving 4 days of IV ceftriaxone.           Interval History: This AM  complaining of HA and neck pain, on my examionation right sternocleidomastoideus TTP. Tylenol with some relief will monitor closely for out of proportion HA or neurological decline. Will try diclofenac topical.    Review of Systems   Constitutional:  Positive for appetite change (decreased). Negative for activity change, chills, diaphoresis and fever.   HENT:  Negative for nosebleeds and trouble swallowing.    Eyes:  Positive for visual disturbance (hx of macular degenertion). Negative for pain.        Ectropion> right   Respiratory:  Negative for cough, choking and shortness of breath.    Cardiovascular:  Negative for chest pain.   Gastrointestinal:  Negative for abdominal pain.   Genitourinary:  Negative for difficulty urinating.   Musculoskeletal:  Positive for neck pain.   Neurological:  Positive for headaches.   Psychiatric/Behavioral:  Negative for confusion and decreased concentration.      Objective:     Vital Signs (Most Recent):  Temp: 97.6 °F (36.4 °C) (04/14/24 1158)  Pulse: 66 (04/14/24 1158)  Resp: 18 (04/14/24 1158)  BP: 131/62 (04/14/24 1158)  SpO2: (!) 93 % (04/14/24 1158) Vital Signs (24h Range):  Temp:  [97.6 °F (36.4 °C)-98.8 °F (37.1 °C)] 97.6 °F (36.4 °C)  Pulse:  [66-75] 66  Resp:  [15-20] 18  SpO2:  [93 %-95 %] 93 %  BP: (120-176)/(56-76) 131/62     Weight: 54.4 kg (120 lb)  Body mass index is 19.97 kg/m².    Intake/Output Summary (Last 24 hours) at 4/14/2024  1441  Last data filed at 4/14/2024 1257  Gross per 24 hour   Intake 1953.46 ml   Output 600 ml   Net 1353.46 ml         Physical Exam  Vitals and nursing note reviewed.   HENT:      Head: Normocephalic. Laceration present.      Comments: Large scalp wound, sutured up on R side of head, bruising around the sutures.      Nose: No congestion or rhinorrhea.      Mouth/Throat:      Mouth: Mucous membranes are dry.      Pharynx: Oropharynx is clear.   Eyes:      General: No scleral icterus.     Extraocular Movements: Extraocular movements intact.   Cardiovascular:      Rate and Rhythm: Normal rate and regular rhythm.   Pulmonary:      Effort: Pulmonary effort is normal. No respiratory distress.      Breath sounds: No wheezing.   Abdominal:      General: Abdomen is flat. There is no distension.      Palpations: Abdomen is soft.      Tenderness: There is no abdominal tenderness.   Musculoskeletal:      Right lower leg: No edema.      Left lower leg: No edema.   Skin:     General: Skin is warm and dry.      Coloration: Skin is pale.      Findings: Laceration present.      Comments: Laceration on R side of scalp and L tib/fib lesion wrapped up and bandage   Neurological:      Mental Status: She is alert.      Motor: Atrophy present.      Comments: Oriented to name, situation, and place.    Psychiatric:         Thought Content: Thought content normal.         Judgment: Judgment normal.             Significant Labs: All pertinent labs within the past 24 hours have been reviewed.  Recent Lab Results         04/14/24  0447        Anion Gap 8       BUN 15       Calcium 8.2       Chloride 103       CO2 20       Creatinine 0.7       eGFR >60.0       Glucose 86       Potassium 4.2       Sodium 131               Significant Imaging: I have reviewed all pertinent imaging results/findings within the past 24 hours.    Assessment/Plan:      Macrocytic anemia  Patient's anemia is currently controlled. Has not received any PRBCs to date.  Etiology likely d/t nutritional deficiency. MCV 99  Current CBC reviewed-   Lab Results   Component Value Date    HGB 11.8 (L) 04/10/2024    HCT 37.0 04/10/2024     Monitor serial CBC and transfuse if patient becomes hemodynamically unstable, symptomatic or H/H drops below 7/21.  B12 and Folate levels ordered    Bacteremia  89F presenting with AMS and falls resulting in laceration. Blood cultures drawn on admission. Both cultures positive for Streptococcus species and were suspected to be contaminants.     Repeat blood cultures negative for 48 hours  Patient placed on vancomycin, pending sensitivities      Microbiology Results (last 7 days)       Procedure Component Value Units Date/Time    Blood culture [[id number]] Collected: 04/10/24 1006    Order Status: Completed Specimen: Blood Updated: 04/14/24 1212     Blood Culture, Routine No Growth to date      No Growth to date      No Growth to date      No Growth to date      No Growth to date    Blood culture [[id number]] Collected: 04/10/24 1011    Order Status: Completed Specimen: Blood from Peripheral, Hand, Right Updated: 04/14/24 1212     Blood Culture, Routine No Growth to date      No Growth to date      No Growth to date      No Growth to date      No Growth to date    Blood culture [[id number]] Collected: 04/11/24 0829    Order Status: Completed Specimen: Blood Updated: 04/14/24 1012     Blood Culture, Routine No Growth to date      No Growth to date      No Growth to date      No Growth to date    Narrative:      Lft hand    Blood culture [[id number]] Collected: 04/11/24 0829    Order Status: Completed Specimen: Blood Updated: 04/14/24 1012     Blood Culture, Routine No Growth to date      No Growth to date      No Growth to date      No Growth to date    Narrative:      Rt wrist    Urine culture [[id number]]  (Abnormal)  (Susceptibility) Collected: 04/09/24 0301    Order Status: Completed Specimen: Urine Updated: 04/12/24 1352     Urine Culture, Routine  ESCHERICHIA COLI  >100,000 cfu/ml      Narrative:      Specimen Source->Urine    Blood culture [[id number]]  (Abnormal) Collected: 04/09/24 0504    Order Status: Completed Specimen: Blood from Peripheral, Antecubital, Left Updated: 04/12/24 0850     Blood Culture, Routine Gram stain karthik bottle: Gram positive cocci in chains resembling Strep      Results called to and read back by: Gema Dodge RN 04/10/2024  09:21      STREPTOCOCCUS MITIS/ORALIS GROUP  Organism is a probable contaminant      Blood culture [[id number]]  (Abnormal) Collected: 04/09/24 0504    Order Status: Completed Specimen: Blood from Peripheral, Antecubital, Right Updated: 04/12/24 0850     Blood Culture, Routine Gram stain karthik bottle: Gram positive cocci in chains resembling Strep      Positive results previously called 04/10/2024  11:42      STREPTOCOCCUS GORDONII  Organism is a probable contaminant      Rapid Organism ID by PCR (from Blood culture) [[id number]]  (Abnormal) Collected: 04/09/24 0504    Order Status: Completed Updated: 04/10/24 1038     Enterococcus faecalis Not Detected     Enterococcus faecium Not Detected     Listeria monocytogenes Not Detected     Staphylococcus spp. Not Detected     Staphylococcus aureus Not Detected     Staphylococcus epidermidis Not Detected     Staphylococcus lugdunensis Not Detected     Streptococcus species Detected     Streptococcus agalactiae Not Detected     Streptococcus pneumoniae Not Detected     Streptococcus pyogenes Not Detected     Acinetobacter calcoaceticus/baumannii complex Not Detected     Bacteroides fragilis Not Detected     Enterobacterales Not Detected     Enterobacter cloacae complex Not Detected     Escherichia coli Not Detected     Klebsiella aerogenes Not Detected     Klebsiella oxytoca Not Detected     Klebsiella pneumoniae group Not Detected     Proteus Not Detected     Salmonella sp Not Detected     Serratia marcescens Not Detected     Haemophilus influenzae Not Detected      Neisseria meningtidis Not Detected     Pseudomonas aeruginosa Not Detected     Stenotrophomonas maltophilia Not Detected     Candida albicans Not Detected     Candida auris Not Detected     Candida glabrata Not Detected     Candida krusei Not Detected     Candida parapsilosis Not Detected     Candida tropicalis Not Detected     Cryptococcus neoformans/gattii Not Detected     CTX-M (ESBL ) Test Not Applicable     IMP (Carbapenem resistant) Test Not Applicable     KPC resistance gene (Carbapenem resistant) Test Not Applicable     mcr-1  Test Not Applicable     mec A/C  Test Not Applicable     mec A/C and MREJ (MRSA) gene Test Not Applicable     NDM (Carbapenem resistant) Test Not Applicable     OXA-48-like (Carbapenem resistant) Test Not Applicable     van A/B (VRE gene) Test Not Applicable     VIM (Carbapenem resistant) Test Not Applicable             Hallucination  Likely secondary to UTI. See UTI (urinary tract infection) and Encephalopathy, metabolic     Hyponatremia  89-year-old woman with slowly worsening hyponatremia during admission.  Patient was started on hydrochlorothiazide for blood pressure management, but was noted to have a drop in sodium.  The medication was discontinued, but the half-life can range from 6-15 hours and will take 4-5 half lives to remove from the body, so there may be an effect for up to 75 hours.  She has also had poor oral intake due to her improving infections.  Due to her head trauma there was some suspicion for possible SIADH, but on 04/11 IV fluids were withheld and her sodium continued to decline, and on for 12 when she was given a small bolus of isotonic normal saline, her sodium level improved.  We suspect this is likely due to a combination of her age, poor oral intake, and hypovolemic hyponatremia.    Daily BMP  Level 6 diet ordered per SLP. Thin liquids, but no straws.  D/C HCTZ  Fluid resuscitation with improvement in sodium.  Assistance with meals  Start 1g salt  tablet      UTI (urinary tract infection)  Pt presenting with confusion, leukocytosis, UA was consistent with a urinary tract infection. Pt chart does not have any resistant cultures, and patient is unaware of any. With her at a nursing home there is risk for moire resistant bugs, but vital signs have been stable. Lactic acid was negative. Patient's mentation is improving after two days of IV ceftriaxone.    Plan  Transition to oral Augmentin to complete 7 day course  Follow urine culture. Positive for E. Coli       HTN (hypertension)  Chronic, uncontrolled. Latest blood pressure and vitals reviewed-     Temp:  [97.6 °F (36.4 °C)-98.8 °F (37.1 °C)]   Pulse:  [66-75]   Resp:  [15-20]   BP: (116-176)/(56-76)   SpO2:  [93 %-95 %] .   Home meds for hypertension were reviewed and noted below.   Hypertension Medications               lisinopriL (PRINIVIL,ZESTRIL) 40 MG tablet Take 40 mg by mouth once daily.    metoprolol succinate (TOPROL-XL) 25 MG 24 hr tablet Take 25 mg by mouth once daily.          Per son not on any home medications        Will utilize p.r.n. blood pressure medication only if patient's blood pressure greater than 180/110 and she develops symptoms such as worsening chest pain or shortness of breath.  Lisinopril 40mg  Metoprolol 25mg BID (nebivolol not on formulary)  HCTZ discontinued for hyponatremia.         Supraventricular tachycardia  Difficult to find her records, but from care everywhere patient was recently taking metoprolol 25mg BID, but was recently transitioned to nebivolol 5mg for SVT.    Metoprolol 25mg BID      Stage 3 chronic kidney disease  Creatine stable for now. BMP reviewed- noted Estimated Creatinine Clearance: 29.7 mL/min (based on SCr of 0.8 mg/dL). according to latest data. Based on current GFR, CKD stage is stage 3 - GFR 30-59.  Monitor UOP and serial BMP and adjust therapy as needed. Renally dose meds. Avoid nephrotoxic medications and procedures.    Cystatin ordered to assess  renal function while patient is on vancomycin        Hyperlipidemia        Dementia with behavioral disturbance  Pt has some baseline dementia, but for the last few days has been experiencing worsening symptoms of hallucinations and worsening confusion. Pt found to have a UTI. Most likely causing the worsening confusion    Mentation improving daily, per son.      VTE Risk Mitigation (From admission, onward)           Ordered     heparin (porcine) injection 5,000 Units  Every 8 hours         04/09/24 1134     Reason for No Pharmacological VTE Prophylaxis  Once        Question:  Reasons:  Answer:  Active Bleeding    04/09/24 0500     IP VTE HIGH RISK PATIENT  Once         04/09/24 0500     Place sequential compression device  Until discontinued         04/09/24 0500                    Discharge Planning   ASHLEY: 4/15/2024     Code Status: DNR   Is the patient medically ready for discharge?: Yes    Reason for patient still in hospital (select all that apply): Pending disposition  Discharge Plan A: Return to nursing home   Discharge Delays: Pending dispo            Samy Nieto MD  Department of Hospital Medicine   [facility] - Neurosurgery ([facility])

## 2024-04-15 VITALS
RESPIRATION RATE: 18 BRPM | WEIGHT: 123.69 LBS | BODY MASS INDEX: 20.61 KG/M2 | OXYGEN SATURATION: 94 % | DIASTOLIC BLOOD PRESSURE: 66 MMHG | SYSTOLIC BLOOD PRESSURE: 140 MMHG | HEIGHT: 65 IN | HEART RATE: 65 BPM | TEMPERATURE: 97 F

## 2024-04-15 LAB
BACTERIA BLD CULT: NORMAL
BACTERIA BLD CULT: NORMAL

## 2024-04-15 PROCEDURE — 25000003 PHARM REV CODE 250

## 2024-04-15 PROCEDURE — 63600175 PHARM REV CODE 636 W HCPCS

## 2024-04-15 PROCEDURE — 25000003 PHARM REV CODE 250: Performed by: STUDENT IN AN ORGANIZED HEALTH CARE EDUCATION/TRAINING PROGRAM

## 2024-04-15 RX ORDER — AMOXICILLIN AND CLAVULANATE POTASSIUM 500; 125 MG/1; MG/1
1 TABLET, FILM COATED ORAL EVERY 12 HOURS
Start: 2024-04-15 | End: 2024-04-16

## 2024-04-15 RX ADMIN — LISINOPRIL 40 MG: 20 TABLET ORAL at 08:04

## 2024-04-15 RX ADMIN — METOPROLOL SUCCINATE 25 MG: 25 TABLET, EXTENDED RELEASE ORAL at 08:04

## 2024-04-15 RX ADMIN — SODIUM CHLORIDE 1000 MG: 1 TABLET ORAL at 08:04

## 2024-04-15 RX ADMIN — DICLOFENAC SODIUM 2 G: 10 GEL TOPICAL at 08:04

## 2024-04-15 RX ADMIN — ACETAMINOPHEN 650 MG: 325 TABLET ORAL at 08:04

## 2024-04-15 RX ADMIN — THERA TABS 1 TABLET: TAB at 08:04

## 2024-04-15 RX ADMIN — HEPARIN SODIUM 5000 UNITS: 5000 INJECTION INTRAVENOUS; SUBCUTANEOUS at 05:04

## 2024-04-15 RX ADMIN — POLYETHYLENE GLYCOL 3350 17 G: 17 POWDER, FOR SOLUTION ORAL at 08:04

## 2024-04-15 RX ADMIN — AMOXICILLIN AND CLAVULANATE POTASSIUM 500 MG: 500; 125 TABLET, FILM COATED ORAL at 08:04

## 2024-04-15 RX ADMIN — HYPROMELLOSE 2910 1 DROP: 5 SOLUTION/ DROPS OPHTHALMIC at 08:04

## 2024-04-15 NOTE — PLAN OF CARE
[facility]irit Vargas - Neurosurgery (Hospital)  Discharge Final Note    Primary Care Provider: KHANH Corea MD    Expected Discharge Date: 4/15/2024    Final Discharge Note (most recent)       Final Note - 04/15/24 1452          Final Note    Assessment Type Final Discharge Note (P)      Anticipated Discharge Disposition care home Nursing Facility (P)         Post-Acute Status    Post-Acute Authorization Other (P)      Other Status No Post-Acute Service Needs (P)      Discharge Delays None known at this time (P)                      Important Message from Medicare  Important Message from Medicare regarding Discharge Appeal Rights: Given to patient/caregiver, Explained to patient/caregiver, Signed/date by patient/caregiver     Date IMM was signed: 04/15/24  Time IMM was signed: 1118    Contact Info       Jono Adorno MD   Specialty: Internal Medicine    [address] [place] [id number]   Phone: [phone]       Next Steps: Go on 4/19/2024    Instructions: Follow up 4/19 at 1040a    KHANH Corea MD   Specialty: Infectious Diseases   Relationship: PCP - General    [address] [place] [id number]   Phone: [phone]       Next Steps: Follow up          Pt. discharging back to [facility]. SW contacted pt's son to inform him. Ambulance transportation arranged via [facility]. SW to contact family again (as requested) when ambulance transportation arrives.     Nilay Hollingsworth LMSW

## 2024-04-15 NOTE — PLAN OF CARE
Follow-up appointment is scheduled for 4/19/24 at 10:20 am.        Haseeb Ding [facility]  Case Management  [phone]

## 2024-04-15 NOTE — NURSING
Called report to facility (nh) @4858, to HARESH Rea.   IV removed and waiting on transport.    Changed dressing on L elbow and L leg, cleansed with normal saline, patted dry with sterile gauze and applied foam dressing to both, dated and initialed dressing.

## 2024-04-15 NOTE — PLAN OF CARE
CHW met with patient/family at bedside. Patient experience rounding completed and reviewed the following.     Do you know your discharge plan? Yes or No,    If yes, what is the plan?  Yes Nursing Home     Have you discussed your needs and preferences with your SW/CM? Yes     If you are discharging home, do you have help at home? Yes  Patient has help at home.    Do you think you will need help additional at home at discharge?  No   Patient has help and no additional help is needed.    Do you currently have difficulty keeping up with bills, affording medicine or buying food?  No  Patient has help with bills, food, and medications.    Assigned SW/CM notified of any patient/family needs or concerns. Appropriate resources provided to address patient's needs.    Nilay Ding [facility]  Case Management  [phone]

## 2024-04-15 NOTE — NURSING
Nurses Note -- 4 Eyes        4/15/2024   7:27 PM        Skin assessed during: Q Shift Change        [] No Altered Skin Integrity Present                 [x]Prevention Measures Documented        [x] Yes- Altered Skin Integrity Present or Discovered              [] LDA Added if Not in Epic (Describe Wound)              [x] New Altered Skin Integrity was Present on Admit and Documented in LDA              [] Wound Image Taken     Wound Care Consulted? Yes  Previously consulted following     Attending Nurse:  JUDIE Espinoza      Second RN/Staff Member:  ANA Hill

## 2024-04-15 NOTE — DISCHARGE SUMMARY
Kirit Vargas - Neurosurgery ([facility])  [facility] Medicine  Discharge Summary      Patient Name: Jayne Aponte  MRN: [id number]  CATRACHITO: [id number]  Patient Class: IP- Inpatient  Admission Date: 4/8/2024  Hospital Length of Stay: 6 days  Discharge Date and Time:  04/15/2024 1:26 PM  Attending Physician: Sharla Del Angel*   Discharging Provider: Daniel Lopez MD  Primary Care Provider: KHANH Corea MD  [facility] Medicine Team: [facility] MED 1 Daniel Lopez MD  Primary Care Team: [facility] MED 1    HPI:   89 yof with pmh on care everywhere of COPD, HTN, CKD3, colon cancer 2003, breast cancer 2006, skin cancer, dementia, functional decline, malnutrition presenting with worsening AMS and two falls at her nursing home. Pt had difficulty telling me why she was there. From discussion with ED staff from the nursing home pt has been confused and hallucinating for 2 days now, no known fevers. Fall was not witnessed but does not think she LOC. Pt does not know how she fell. Pt asked for how she was going to get a taxi during interview. Pt's paper work has that patient is a DNR    In the ED: CT head/neck negative for bleeds or fracture, glucose was WNL, Urine was positive for UTI. No prior resistant cultures, but higher risk coming from nursing home.     * No surgery found *      Hospital Course:   Ms. pAonte is a 89-year-old female with dementia and reported functional decline who presented with altered mental status and two falls at her nursing home resulting in a right head laceration. Due to her falls and altered mentation she receive a CT Head which showed no acute intracranial processes and CT cervical spine which showed no fractures. She was also hypertensive on arrival, and was given lisinopril, HCTZ, and metoprolol succinate. Upon admission, she was found to have an E. Coli UTI and was started on IV ceftriaxone. Her blood cultures were positive for streptococcus and she was started on vancomycin while  awaiting for sensitivity data. Blood cultures grew strep gordonii and strep mitis which were presumed to be contaminants. During her stay, she developed mild hyponatremia, so HCTZ was discontinued.  We suspect some of her hyponatremia was due to lingering effect of hydrochlorothiazide, possibly from her, but her sodium decreased when she was not given IV fluids, lower suspicion for SIADH, and sodium increased with gentle fluid administration. Her mentation improved while being treated with antibiotics. She was noted to have a mild macrocytic anemia, so B12 and folate levels were ordered but were found to be within normal limits.  After her repeat blood cultures were negative for 48 hours, and the urine culture sensitivities resulted, the patient was transitioned to oral Augmentin after receiving 4 days of IV ceftriaxone. Discharge to her nursing home was delayed over the weekend by her facility. She experienced neck tenderness and headache on 4/14-4/15 and given history of traumatic fall with a forehead laceration, repeat CT head was ordered to rule out possible subdural hematoma, but no acute intracranial abnormalities were noted. Due to the prolonged stay, occupational therapy changed recommendations from no therapies required to recommending low intensity therapy 3x/wk which she will get at her nursing home. She was stable for discharge with hospital follow up scheduled for 4/19/24.            Goals of Care Treatment Preferences:  Code Status: DNR          What is most important right now is to focus on remaining as independent as possible.  Accordingly, we have decided that the best plan to meet the patient's goals includes continuing with treatment.      Consults:   Consults (From admission, onward)          Status Ordering Provider     Inpatient consult to Registered Dietitian/Nutritionist  Once        Provider:  (Not yet assigned)    Acknowledged LINDY POOLE            No new Assessment & Plan  notes have been filed under this hospital service since the last note was generated.  Service: Hospital Medicine    Final Active Diagnoses:    Diagnosis Date Noted POA    Bacteremia [R78.81] 04/11/2024 Yes    Macrocytic anemia [D53.9] 04/11/2024 Yes    Hallucination [R44.3] 04/10/2024 Yes    HTN (hypertension) [I10] 04/09/2024 Yes    UTI (urinary tract infection) [N39.0] 04/09/2024 Yes    Hyponatremia [E87.1] 04/09/2024 Yes    Dementia with behavioral disturbance [F03.918] 09/26/2023 Yes    Supraventricular tachycardia [I47.10] 01/12/2023 Yes    Stage 3 chronic kidney disease [N18.30] 01/12/2023 Yes      Problems Resolved During this Admission:    Diagnosis Date Noted Date Resolved POA    PRINCIPAL PROBLEM:  Encephalopathy, metabolic [G93.41] 04/10/2024 04/14/2024 Yes    Sepsis due to Streptococcus species [A40.9] 04/11/2024 04/12/2024 Yes    History of skin cancer [Z85.828] 04/10/2024 04/14/2024 Not Applicable    Delirium [R41.0] 04/10/2024 04/14/2024 Yes       Discharged Condition: stable    Disposition: Another Health Care Inst*    Follow Up:   Follow-up Information       Bank, Jono Sutton MD. Go on 4/19/2024.    Specialty: Internal Medicine  Why: Follow up 4/19 at 1040a  Contact information:  [address] [id number] [phone]                           Patient Instructions:   No discharge procedures on file.    Significant Diagnostic Studies: N/A    Pending Diagnostic Studies:       None           Medications:  Reconciled Home Medications:      Medication List        START taking these medications      amoxicillin-clavulanate 500-125mg 500-125 mg Tab  Commonly known as: AUGMENTIN  Take 1 tablet (500 mg total) by mouth every 12 (twelve) hours. for 1 day     lisinopriL 40 MG tablet  Commonly known as: PRINIVIL,ZESTRIL  Take 1 tablet (40 mg total) by mouth once daily.            CONTINUE taking these medications      acetaminophen 500 MG tablet  Commonly known as: TYLENOL  Take 1,000  mg by mouth every 8 (eight) hours as needed for Pain.     meloxicam 7.5 MG tablet  Commonly known as: MOBIC  Take 1 tablet by mouth once daily.     nebivoloL 5 MG Tab  Commonly known as: BYSTOLIC  Take 1 tablet by mouth once daily.     VITAMIN D2 50,000 unit Cap  Generic drug: ergocalciferol  Take 50,000 Units by mouth every 7 days. (FRIDAY)              Indwelling Lines/Drains at time of discharge:   Lines/Drains/Airways       Drain  Duration             Female External Urinary Catheter w/ Suction 04/09/24 0816 6 days                    Time spent on the discharge of patient: 40 minutes         Daniel Lopez MD  Department of Hospital Medicine  [facility] - Neurosurgery ([facility])

## 2024-04-15 NOTE — PLAN OF CARE
Problem: Adult Inpatient Plan of Care  Goal: Plan of Care Review  4/15/2024 0653 by Olga Castañeda RN  Outcome: Ongoing, Progressing  4/15/2024 0653 by Olga Castañeda RN  Outcome: Ongoing, Progressing  4/15/2024 0651 by Olga Castañeda RN  Outcome: Ongoing, Progressing  Goal: Patient-Specific Goal (Individualized)  Description: Pt will maintain sbp below 180  4/15/2024 0653 by Olga Castañeda RN  Outcome: Ongoing, Progressing  4/15/2024 0653 by Olga Castañeda RN  Outcome: Ongoing, Progressing  4/15/2024 0651 by Olga Castañeda RN  Outcome: Ongoing, Progressing  Goal: Absence of Hospital-Acquired Illness or Injury  4/15/2024 0653 by Olga Castañeda RN  Outcome: Ongoing, Progressing  4/15/2024 0653 by Olga Castañeda RN  Outcome: Ongoing, Progressing  4/15/2024 0651 by Olga Castañeda RN  Outcome: Ongoing, Progressing  Goal: Optimal Comfort and Wellbeing  4/15/2024 0653 by Olga Castañeda RN  Outcome: Ongoing, Progressing  4/15/2024 0653 by Olga Castañeda RN  Outcome: Ongoing, Progressing  4/15/2024 0651 by Olga Castañeda RN  Outcome: Ongoing, Progressing  Goal: Readiness for Transition of Care  4/15/2024 0653 by Olga Castañeda RN  Outcome: Ongoing, Progressing  4/15/2024 0653 by Olga Castañeda RN  Outcome: Ongoing, Progressing  4/15/2024 0651 by Olga Castañeda RN  Outcome: Ongoing, Progressing     Problem: Impaired Wound Healing  Goal: Optimal Wound Healing  4/15/2024 0653 by Olga Castañeda RN  Outcome: Ongoing, Progressing  4/15/2024 0653 by Olga Castañeda RN  Outcome: Ongoing, Progressing  4/15/2024 0651 by Olga Castañeda RN  Outcome: Ongoing, Progressing     Problem: Skin Injury Risk Increased  Goal: Skin Health and Integrity  4/15/2024 0653 by Olga Castañeda RN  Outcome: Ongoing, Progressing  4/15/2024 0653 by Olga Castañeda RN  Outcome: Ongoing, Progressing  4/15/2024 0651 by Olga Castañeda RN  Outcome: Ongoing, Progressing      Problem: Fall Injury Risk  Goal: Absence of Fall and Fall-Related Injury  4/15/2024 0653 by Olga Castañeda RN  Outcome: Ongoing, Progressing  4/15/2024 0653 by Olga Castañeda RN  Outcome: Ongoing, Progressing  4/15/2024 0651 by Olga Castañeda RN  Outcome: Ongoing, Progressing     Problem: Pain Acute  Goal: Acceptable Pain Control and Functional Ability  4/15/2024 0653 by Olga Castañeda RN  Outcome: Ongoing, Progressing  4/15/2024 0653 by Olga Castañeda RN  Outcome: Ongoing, Progressing     Problem: Infection  Goal: Absence of Infection Signs and Symptoms  4/15/2024 0653 by Olga Castañeda RN  Outcome: Ongoing, Progressing  4/15/2024 0653 by Olga Castañeda RN  Outcome: Ongoing, Progressing

## 2024-04-16 LAB
BACTERIA BLD CULT: NORMAL
BACTERIA BLD CULT: NORMAL

## 2024-07-15 PROBLEM — N39.0 UTI (URINARY TRACT INFECTION): Status: RESOLVED | Noted: 2024-04-09 | Resolved: 2024-07-15

## 2025-08-24 PROBLEM — N39.0 UTI (URINARY TRACT INFECTION): Status: ACTIVE | Noted: 2025-08-24

## 2025-08-24 PROBLEM — U07.1 COVID: Status: ACTIVE | Noted: 2025-08-24

## 2025-08-24 PROBLEM — R41.82 ALTERED MENTAL STATE: Status: ACTIVE | Noted: 2025-08-24

## 2025-08-25 PROBLEM — U07.1 COVID-19: Status: ACTIVE | Noted: 2025-08-25

## 2025-08-27 DIAGNOSIS — U07.1 COVID-19 VIRUS DETECTED: ICD-10-CM

## 2025-08-27 PROBLEM — Z51.5 COMFORT MEASURES ONLY STATUS: Status: ACTIVE | Noted: 2025-08-27
